# Patient Record
Sex: FEMALE | Race: WHITE | NOT HISPANIC OR LATINO | Employment: OTHER | URBAN - METROPOLITAN AREA
[De-identification: names, ages, dates, MRNs, and addresses within clinical notes are randomized per-mention and may not be internally consistent; named-entity substitution may affect disease eponyms.]

---

## 2017-02-14 ENCOUNTER — GENERIC CONVERSION - ENCOUNTER (OUTPATIENT)
Dept: OTHER | Facility: OTHER | Age: 42
End: 2017-02-14

## 2017-02-21 ENCOUNTER — ALLSCRIPTS OFFICE VISIT (OUTPATIENT)
Dept: OTHER | Facility: OTHER | Age: 42
End: 2017-02-21

## 2017-04-07 ENCOUNTER — ALLSCRIPTS OFFICE VISIT (OUTPATIENT)
Dept: OTHER | Facility: OTHER | Age: 42
End: 2017-04-07

## 2017-06-01 ENCOUNTER — ALLSCRIPTS OFFICE VISIT (OUTPATIENT)
Dept: OTHER | Facility: OTHER | Age: 42
End: 2017-06-01
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
## 2018-01-12 NOTE — MISCELLANEOUS
To whom it may concern:  Sana Huitron (1975) is a patient who follows with our office and was recently hospitalized from 4/2/16-4/10/16 for medical  reasons  She is able to return to school 4/18/16  Please allow for any accommodations as needed for patient with regard to parking, lifting, and stair use  Please contact our office if any further questions or concerns  Thank you in advance with your  assistance with this matter      Sincerely,    Evelin Marvin PA-C        Electronically signed by:[facility]  [facility]  Apr 13 2016 12:50PM EST

## 2018-01-13 VITALS
WEIGHT: 251 LBS | SYSTOLIC BLOOD PRESSURE: 141 MMHG | DIASTOLIC BLOOD PRESSURE: 82 MMHG | HEIGHT: 64 IN | HEART RATE: 73 BPM | BODY MASS INDEX: 42.85 KG/M2

## 2018-01-13 VITALS
SYSTOLIC BLOOD PRESSURE: 136 MMHG | DIASTOLIC BLOOD PRESSURE: 80 MMHG | BODY MASS INDEX: 42 KG/M2 | WEIGHT: 246 LBS | HEART RATE: 73 BPM | HEIGHT: 64 IN
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
## 2018-01-16 NOTE — PSYCH
Message  Message Free Text Note Form: Patient is a 47year old white female with a history of bipolar disorder and anxiety who presented today for a psychiatric evaluation appointment  At the beginning of the session the patient stated she had a long history of bipolar disorder with past hospitalizations, but stopped all her medications 3 years ago (including Lithium) and at this time she only wanted treatment for anxiety, and not for bipolar disorder  Patient was informed that as a psychiatrist I would only accept her as a patient if she was willing to undergo appropriate recommended treatment including treatment for a life long illness as bipolar disorder  At that point patient stated she was not willing to undergo psychiatric evaluation, walked out of the office and left  I informed the patient as she was leaving that she could follow up for anxiety with her PCP  Active Problems    1  Acute Chest Wall Trauma (959 11)   2  Acute frontal sinusitis, recurrence not specified (461 1) (J01 10)   3  ADHD (attention deficit hyperactivity disorder), predominantly hyperactive impulsive type   (314 01) (F90 1)   4  Anxiety (300 00) (F41 9)   5  Bipolar disorder (296 80) (F31 9)   6  Contusion Of The Leg With Intact Skin Surface (924 10)   7  Depression (311) (F32 9)   8  Encounter for PPD test (V74 1) (Z11 1)   9  Familial Combined Hyperlipidemia (272 2)   10  Insomnia (780 52) (G47 00)   11  Need for hepatitis B vaccination (V05 3) (Z23)   12  Need for MMR vaccine (V06 4) (Z23)   13  Need for varicella vaccine (V05 4) (Z23)   14  UTI (urinary tract infection) (599 0) (N39 0)   15  Vitamin D deficiency (268 9) (E55 9)    Current Meds   1  Azithromycin 250 MG Oral Tablet; TAKE 2 TABLETS ON DAY 1 THEN TAKE 1 TABLET A   DAY FOR 4 DAYS; Therapy: 92TCO7214 to (Last Rx:22Twg6964) Ordered   2  Ergocalciferol 21169 UNIT Oral Capsule; TAKE 1 CAPSULE WEEKLY;    Therapy: 83MEM3873 to (Last Rx:16Oct2015)  Requested for: 62LHZ3209 Ordered   3  LORazepam 1 MG Oral Tablet; TAKE 1 TABLET EVERY 12 HOURS as needed; Therapy: 94Tfp3295 to (Evaluate:56Emz4186); Last Rx:50Rua2237 Ordered    Allergies    1  No Known Drug Allergies    2   Seasonal    Signatures   Electronically signed by : RIO Trinh ; Feb 2 2016  2:37PM EST                       (Author)
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
## 2018-01-22 VITALS
BODY MASS INDEX: 41.32 KG/M2 | WEIGHT: 242 LBS | SYSTOLIC BLOOD PRESSURE: 124 MMHG | DIASTOLIC BLOOD PRESSURE: 83 MMHG | HEIGHT: 64 IN | HEART RATE: 74 BPM

## 2018-01-23 DIAGNOSIS — Z12.31 ENCOUNTER FOR SCREENING MAMMOGRAM FOR MALIGNANT NEOPLASM OF BREAST: ICD-10-CM

## 2018-03-07 NOTE — PROGRESS NOTES
History of Present Illness    Revaccination    Spoke with patient regarding vaccine out of temperature range and option for titer  Action(s): Pt will be revaccinated  Appointment scheduled: [id number] JQ  Pt contacted and will call back  Pt called (attempt 1): [phone] kz  Message left on [phone]  Pt called (attempt 2): [phone] kz  Message left on [phone]  Letter Sent (Regular and Certified): [phone]  Other Information: [phone] [id number] [facility] patient  She is agreeable to [facility] but she is at work and is not able to schedule at the time of this call  She will call back  Jazz Swanson  [phone] kz - Called and spoke with patient  She scheduled the Marco Aleta  She would like the Hepatitis B titer as recommended by Dr Bello Delgado  She will  the order form at the time of her RVAC  kz  3/10/2017 1600 kz - No show for RVAC and titer  Jazz Swanson  [phone] kz - Message left on cell phone voicemail requesting a callback with her RVAC/titer decision  Jazz Swanson  [phone] kz - Message left for patient on [phone] requesting a call back to nisreen Rodriguez  [phone] [facility] and spoke with the patient  She has decided to decline the offer of RVAC and/or titer testing  Letters and [employer] mailed today  [phone] USPS return receipt received  See scanned in copy in chart  kz  Active Problems    1  ADHD, predominantly inattentive type (314 00) (F90 0)   2  Arthralgia, unspecified joint (719 40) (M25 50)   3  Bipolar I disorder, most recent episode depressed, severe with psychotic features   (296 54) (F31 5)   4  Contusion Of The Leg With Intact Skin Surface (924 10)   5  SANA (generalized anxiety disorder) (300 02) (F41 1)   6  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 9) (K31 9)   7  Insomnia (780 52) (G47 00)   8  Need for revaccination (V05 9) (Z23)   9  Peptic ulcer disease (533 90) (K27 9)   10   Vitamin D deficiency (268 9) (E51 9)    Immunizations  Hepatitis B --- Cy Suarez: 04-Cul-3596Pswze Mallet: 16-Nov-2015   Influenza --- Lucho Stare: 16-Mxr-1509Cedsq [place]: 13-Oct-2015   PPD --- Series1: 15-Dec-2015   Tdap --- Series1: 15-Oct-2015     Current Meds   1  ARIPiprazole 10 MG Oral Tablet; TAKE 1/2 TABLET AT BEDTIME FOR 7 DAYS THEN 1   TABLET AT BEDTIME   2  Ergocalciferol 74215 UNIT CAPS; TAKE 1 CAPSULE WEEKLY   3  LamoTRIgine 25 MG Oral Tablet; Take 1 tablet at bedtime for 14 days then 2 tablets at   bedtime   4  LORazepam 1 MG Oral Tablet; TAKE 1 TABLET TWICE DAILY AS NEEDED    Allergies    1  No Known Drug Allergies    2  Seasonal    Future Appointments    Date/Time Provider Specialty Site   02/21/2017 02:15 PM RIO Haley   [facility]     Signatures   Electronically signed by : Milli Rodriguez [facility]; Feb 15 2017  8:18AM EST                       (Author)    Electronically signed by : Denver Yang RN; Jul 12 2017 10:26AM EST                       (Author)

## 2018-05-07 ENCOUNTER — OFFICE VISIT (OUTPATIENT)
Dept: INTERNAL MEDICINE CLINIC | Facility: CLINIC | Age: 43
End: 2018-05-07
Payer: MEDICARE

## 2018-05-07 VITALS
RESPIRATION RATE: 16 BRPM | HEIGHT: 64 IN | WEIGHT: 247.4 LBS | OXYGEN SATURATION: 98 % | BODY MASS INDEX: 42.24 KG/M2 | HEART RATE: 78 BPM | TEMPERATURE: 98.5 F | SYSTOLIC BLOOD PRESSURE: 130 MMHG | DIASTOLIC BLOOD PRESSURE: 80 MMHG

## 2018-05-07 DIAGNOSIS — Z13.228 SCREENING FOR METABOLIC DISORDER: ICD-10-CM

## 2018-05-07 DIAGNOSIS — J40 BRONCHITIS: Primary | ICD-10-CM

## 2018-05-07 PROCEDURE — 99213 OFFICE O/P EST LOW 20 MIN: CPT | Performed by: NURSE PRACTITIONER

## 2018-05-07 RX ORDER — AZITHROMYCIN 250 MG/1
TABLET, FILM COATED ORAL
Qty: 6 TABLET | Refills: 0 | Status: SHIPPED | OUTPATIENT
Start: 2018-05-07 | End: 2018-05-11

## 2018-05-07 RX ORDER — ATOMOXETINE 40 MG/1
1 CAPSULE ORAL DAILY
COMMUNITY
Start: 2017-02-21 | End: 2018-07-02 | Stop reason: SDUPTHER

## 2018-05-07 RX ORDER — LORAZEPAM 1 MG/1
1 TABLET ORAL 2 TIMES DAILY PRN
COMMUNITY
Start: 2013-09-13 | End: 2018-07-02 | Stop reason: SDUPTHER

## 2018-05-07 RX ORDER — ARIPIPRAZOLE 10 MG/1
10 TABLET ORAL
COMMUNITY
End: 2018-07-02 | Stop reason: SDUPTHER

## 2018-05-07 RX ORDER — FLUTICASONE PROPIONATE 50 MCG
1 SPRAY, SUSPENSION (ML) NASAL DAILY
Qty: 16 G | Refills: 0 | Status: SHIPPED | OUTPATIENT
Start: 2018-05-07 | End: 2018-07-02 | Stop reason: ALTCHOICE

## 2018-05-07 RX ORDER — LAMOTRIGINE 200 MG/1
100 TABLET ORAL
COMMUNITY
Start: 2016-12-22 | End: 2018-07-02 | Stop reason: SDUPTHER

## 2018-05-07 RX ORDER — CITALOPRAM 20 MG/1
20 TABLET ORAL
COMMUNITY
Start: 2014-07-30 | End: 2018-07-02 | Stop reason: ALTCHOICE

## 2018-05-07 RX ORDER — ERGOCALCIFEROL (VITAMIN D2) 1250 MCG
1 CAPSULE ORAL WEEKLY
COMMUNITY
Start: 2016-09-01 | End: 2018-05-30 | Stop reason: SDUPTHER

## 2018-05-07 NOTE — PATIENT INSTRUCTIONS
Get Debrox drops to soften ear wax  Get fasting bloodwork done  Use Flonase 2 sprays each nostril once daily for postnasal drip  Use Mucinex as prescribed on the box to dry up your sinuses  Rest and fluids advised  Educated that the course of this illness could be 2-4 weeks  Discussed symptomatic relief, such as warm steam inhalations, tylenol/ibuprofen for fevers and body aches, rest, and drink plenty of fluids  Warm salt gargles for sore throat  Discussed red flag signs to go to the ER, such as chest pain or shortness of breath     Return to the office for reevaluation if symptoms worsen or do not improve in 1-2 weeks

## 2018-05-07 NOTE — ASSESSMENT & PLAN NOTE
Patient has had lab work since 2016, will order full blood work panel for lab work and review in 2 weeks with patient

## 2018-05-07 NOTE — PROGRESS NOTES
Assessment/Plan:    Bronchitis  Will give patient Z-Sean  Patient advised to take her antibiotic with food, and to eat yogurt to prevent yeast infection  Get Debrox drops to soften ear wax  Get fasting bloodwork done  Use Flonase 2 sprays each nostril once daily for postnasal drip  Use Mucinex as prescribed on the box to dry up your sinuses  Rest and fluids advised  Educated that the course of this illness could be 2-4 weeks  Discussed symptomatic relief, such as warm steam inhalations, tylenol/ibuprofen for fevers and body aches, rest, and drink plenty of fluids  Warm salt gargles for sore throat  Discussed red flag signs to go to the ER, such as chest pain or shortness of breath  Return to the office for reevaluation if symptoms worsen or do not improve in 1-2 weeks    Screening for metabolic disorder    Patient has had lab work since 2016, will order full blood work panel for lab work and review in 2 weeks with patient  Diagnoses and all orders for this visit:    Bronchitis  -     azithromycin (ZITHROMAX) 250 mg tablet; Take 2 tablets today, and one tablet daily for 4 days  -     fluticasone (FLONASE) 50 mcg/act nasal spray; 1 spray into each nostril daily    Screening for metabolic disorder  -     HEMOGLOBIN A1C W/ EAG ESTIMATION  -     Comprehensive metabolic panel; Future  -     CBC and differential  -     Lipid panel  -     TSH, 3rd generation with T4 reflex  -     Vitamin D 25 hydroxy    Other orders  -     citalopram (CeleXA) 20 mg tablet; Take 20 mg by mouth  -     ARIPiprazole (ABILIFY) 10 mg tablet; Take 10 mg by mouth  -     ergocalciferol (ERGOCALCIFEROL) 13685 units capsule; Take 1 capsule by mouth once a week  -     lamoTRIgine (LaMICtal) 200 MG tablet; Take 1 tablet by mouth  -     LORazepam (ATIVAN) 1 mg tablet; Take 1 tablet by mouth 2 (two) times a day as needed  -     atoMOXetine (STRATTERA) 40 mg capsule;  Take 1 capsule by mouth daily          Subjective:      Patient ID: Guy Peguero is a 38 y o  female  Patient presents today with nasal congestion and ear pressure for about 1 week  Patient also reports being short of breath for the week which has been worsening  Patient also has complaints of left flank pain and lightheadedness with  Deep breathing  Denies sick contacts  URI    This is a new problem  The current episode started in the past 7 days  The problem has been gradually worsening  There has been no fever  Associated symptoms include congestion, coughing (dry), ear pain (L>R), a plugged ear sensation and rhinorrhea  Pertinent negatives include no abdominal pain, chest pain, diarrhea, dysuria, headaches, nausea, sinus pain, sore throat, vomiting or wheezing  Treatments tried: OTC cold medication  The treatment provided no relief  The following portions of the patient's history were reviewed and updated as appropriate: allergies, current medications, past family history, past medical history, past social history, past surgical history and problem list     Review of Systems   Constitutional: Positive for appetite change, chills, diaphoresis and fatigue  HENT: Positive for congestion, ear pain (L>R) and rhinorrhea  Negative for postnasal drip, sinus pain, sinus pressure and sore throat  Eyes: Negative for pain, redness and itching  Respiratory: Positive for cough (dry), chest tightness (due to coughing) and shortness of breath (feels like she can not take a deep breath)  Negative for wheezing  Cardiovascular: Negative for chest pain  Gastrointestinal: Negative for abdominal pain, blood in stool, diarrhea, nausea and vomiting  Genitourinary: Positive for flank pain (due to coughing)  Negative for dysuria and hematuria  Musculoskeletal: Negative for myalgias  Allergic/Immunologic: Positive for environmental allergies  Neurological: Positive for light-headedness (when coughing)  Negative for headaches           Past Medical History: Diagnosis Date    Acute pancreatitis     Last Assessed: 2016    Anemia     Anorexia     Bipolar 1 disorder, mixed, partial remission (HCC)     Concussion     Last Assessed:     Endometriosis     Esophageal reflux     Familial combined hyperlipidemia     Last Assessed: 2013    Irregular heart beat     Migraine     Last Assessed: 2012    Paroxysmal supraventricular tachycardia (HCC)     Seizures (HCC)     Suicide attempt [facility])          Current Outpatient Prescriptions:     ARIPiprazole (ABILIFY) 10 mg tablet, Take 10 mg by mouth, Disp: , Rfl:     atoMOXetine (STRATTERA) 40 mg capsule, Take 1 capsule by mouth daily, Disp: , Rfl:     citalopram (CeleXA) 20 mg tablet, Take 20 mg by mouth, Disp: , Rfl:     ergocalciferol (ERGOCALCIFEROL) 92038 units capsule, Take 1 capsule by mouth once a week, Disp: , Rfl:     lamoTRIgine (LaMICtal) 200 MG tablet, Take 1 tablet by mouth, Disp: , Rfl:     LORazepam (ATIVAN) 1 mg tablet, Take 1 tablet by mouth 2 (two) times a day as needed, Disp: , Rfl:     azithromycin (ZITHROMAX) 250 mg tablet, Take 2 tablets today, and one tablet daily for 4 days  , Disp: 6 tablet, Rfl: 0    fluticasone (FLONASE) 50 mcg/act nasal spray, 1 spray into each nostril daily, Disp: 16 g, Rfl: 0    Allergies   Allergen Reactions    Other      Seasonal allergies       Social History   Past Surgical History:   Procedure Laterality Date     SECTION      INDUCED       x3    LAPAROSCOPIC CHOLECYSTECTOMY      Last Assessed: 2016    LAPAROSCOPY      Exploratory    TONSILLECTOMY      Onset: [id number]     Family History   Problem Relation Age of Onset    Hyperthyroidism Mother     Thyroid disease Mother     Depression Father     Hypertension Father     Obesity Father     Other Paternal Grandmother      Cardiac Disorder    Dementia Paternal Grandmother     Hypertension Paternal Grandmother     Hypertension Paternal Geena Sheridan Other Paternal Grandfather      Cardiac Disorder    Schizophrenia Other     Schizophrenia Other     Suicidality Other     Breast cancer Other        Objective:  /80 (BP Location: Left arm, Patient Position: Sitting, Cuff Size: Large)   Pulse 78   Temp 98 5 °F (36 9 °C) (Oral)   Resp 16   Ht 5' 3 75" (1 619 m)   Wt 112 kg (247 lb 6 4 oz)   SpO2 98%   BMI 42 80 kg/m²     No results found for this or any previous visit (from the past 1344 hour(s))  Physical Exam   Constitutional: She is oriented to person, place, and time  She appears well-developed and well-nourished  She appears ill  No distress  HENT:   Head: Normocephalic and atraumatic  Right Ear: External ear normal  Tympanic membrane is bulging  A middle ear effusion is present  Left Ear: External ear normal    Nose: Mucosal edema and rhinorrhea present  Right sinus exhibits maxillary sinus tenderness  Right sinus exhibits no frontal sinus tenderness  Left sinus exhibits maxillary sinus tenderness  Left sinus exhibits no frontal sinus tenderness  Mouth/Throat: Mucous membranes are pale and dry  Posterior oropharyngeal erythema present  No oropharyngeal exudate or posterior oropharyngeal edema  Unable to view tympanic membrane in left ear due to cerumen impaction   Eyes: Conjunctivae and EOM are normal  Pupils are equal, round, and reactive to light  Right eye exhibits no discharge  Left eye exhibits no discharge  Neck: Normal range of motion  Neck supple  No thyromegaly present  Cardiovascular: Normal rate, regular rhythm, normal heart sounds and intact distal pulses  Exam reveals no gallop and no friction rub  No murmur heard  Pulmonary/Chest: Effort normal and breath sounds normal  No stridor  No respiratory distress  She has no decreased breath sounds  She has no wheezes  She has no rhonchi  She has no rales  Abdominal: Soft  Bowel sounds are normal  She exhibits no distension  There is no tenderness  Lymphadenopathy:     She has no cervical adenopathy  Neurological: She is alert and oriented to person, place, and time  Skin: Skin is warm and dry  No rash noted  She is not diaphoretic  No erythema  Psychiatric: She has a normal mood and affect   Her behavior is normal  Judgment and thought content normal

## 2018-05-07 NOTE — ASSESSMENT & PLAN NOTE
Will give patient Z-Sean  Patient advised to take her antibiotic with food, and to eat yogurt to prevent yeast infection  Get Debrox drops to soften ear wax  Get fasting bloodwork done  Use Flonase 2 sprays each nostril once daily for postnasal drip  Use Mucinex as prescribed on the box to dry up your sinuses  Rest and fluids advised  Educated that the course of this illness could be 2-4 weeks  Discussed symptomatic relief, such as warm steam inhalations, tylenol/ibuprofen for fevers and body aches, rest, and drink plenty of fluids  Warm salt gargles for sore throat  Discussed red flag signs to go to the ER, such as chest pain or shortness of breath     Return to the office for reevaluation if symptoms worsen or do not improve in 1-2 weeks

## 2018-05-27 ENCOUNTER — APPOINTMENT (OUTPATIENT)
Dept: LAB | Facility: HOSPITAL | Age: 43
End: 2018-05-27
Payer: MEDICARE

## 2018-05-27 ENCOUNTER — TRANSCRIBE ORDERS (OUTPATIENT)
Dept: LAB | Facility: HOSPITAL | Age: 43
End: 2018-05-27

## 2018-05-27 DIAGNOSIS — Z13.228 SCREENING FOR METABOLIC DISORDER: ICD-10-CM

## 2018-05-27 LAB
25(OH)D3 SERPL-MCNC: 12.6 NG/ML (ref 30–100)
ALBUMIN SERPL BCP-MCNC: 3.5 G/DL (ref 3.5–5)
ALP SERPL-CCNC: 96 U/L (ref 46–116)
ALT SERPL W P-5'-P-CCNC: 37 U/L (ref 12–78)
ANION GAP SERPL CALCULATED.3IONS-SCNC: 7 MMOL/L (ref 4–13)
AST SERPL W P-5'-P-CCNC: 17 U/L (ref 5–45)
BASOPHILS # BLD AUTO: 0.02 THOUSANDS/ΜL (ref 0–0.1)
BASOPHILS NFR BLD AUTO: 0 % (ref 0–1)
BILIRUB SERPL-MCNC: 0.32 MG/DL (ref 0.2–1)
BUN SERPL-MCNC: 11 MG/DL (ref 5–25)
CALCIUM SERPL-MCNC: 8.8 MG/DL (ref 8.3–10.1)
CHLORIDE SERPL-SCNC: 106 MMOL/L (ref 100–108)
CHOLEST SERPL-MCNC: 191 MG/DL (ref 50–200)
CO2 SERPL-SCNC: 24 MMOL/L (ref 21–32)
CREAT SERPL-MCNC: 0.85 MG/DL (ref 0.6–1.3)
EOSINOPHIL # BLD AUTO: 0.12 THOUSAND/ΜL (ref 0–0.61)
EOSINOPHIL NFR BLD AUTO: 2 % (ref 0–6)
ERYTHROCYTE [DISTWIDTH] IN BLOOD BY AUTOMATED COUNT: 12.7 % (ref 11.6–15.1)
EST. AVERAGE GLUCOSE BLD GHB EST-MCNC: 111 MG/DL
GFR SERPL CREATININE-BSD FRML MDRD: 86 ML/MIN/1.73SQ M
GLUCOSE P FAST SERPL-MCNC: 96 MG/DL (ref 65–99)
HBA1C MFR BLD: 5.5 % (ref 4.2–6.3)
HCT VFR BLD AUTO: 38.2 % (ref 34.8–46.1)
HDLC SERPL-MCNC: 41 MG/DL (ref 40–60)
HGB BLD-MCNC: 12.6 G/DL (ref 11.5–15.4)
LDLC SERPL CALC-MCNC: 135 MG/DL (ref 0–100)
LYMPHOCYTES # BLD AUTO: 2.24 THOUSANDS/ΜL (ref 0.6–4.47)
LYMPHOCYTES NFR BLD AUTO: 32 % (ref 14–44)
MCH RBC QN AUTO: 28 PG (ref 26.8–34.3)
MCHC RBC AUTO-ENTMCNC: 33 G/DL (ref 31.4–37.4)
MCV RBC AUTO: 85 FL (ref 82–98)
MONOCYTES # BLD AUTO: 0.51 THOUSAND/ΜL (ref 0.17–1.22)
MONOCYTES NFR BLD AUTO: 7 % (ref 4–12)
NEUTROPHILS # BLD AUTO: 4.14 THOUSANDS/ΜL (ref 1.85–7.62)
NEUTS SEG NFR BLD AUTO: 59 % (ref 43–75)
NONHDLC SERPL-MCNC: 150 MG/DL
NRBC BLD AUTO-RTO: 0 /100 WBCS
PLATELET # BLD AUTO: 327 THOUSANDS/UL (ref 149–390)
PMV BLD AUTO: 10.6 FL (ref 8.9–12.7)
POTASSIUM SERPL-SCNC: 4.3 MMOL/L (ref 3.5–5.3)
PROT SERPL-MCNC: 7.5 G/DL (ref 6.4–8.2)
RBC # BLD AUTO: 4.5 MILLION/UL (ref 3.81–5.12)
SODIUM SERPL-SCNC: 137 MMOL/L (ref 136–145)
TRIGL SERPL-MCNC: 76 MG/DL
TSH SERPL DL<=0.05 MIU/L-ACNC: 1.75 UIU/ML (ref 0.36–3.74)
WBC # BLD AUTO: 7.05 THOUSAND/UL (ref 4.31–10.16)

## 2018-05-27 PROCEDURE — 82306 VITAMIN D 25 HYDROXY: CPT | Performed by: NURSE PRACTITIONER

## 2018-05-27 PROCEDURE — 80053 COMPREHEN METABOLIC PANEL: CPT

## 2018-05-27 PROCEDURE — 80061 LIPID PANEL: CPT | Performed by: NURSE PRACTITIONER

## 2018-05-27 PROCEDURE — 83036 HEMOGLOBIN GLYCOSYLATED A1C: CPT | Performed by: NURSE PRACTITIONER

## 2018-05-27 PROCEDURE — 84443 ASSAY THYROID STIM HORMONE: CPT | Performed by: NURSE PRACTITIONER

## 2018-05-27 PROCEDURE — 85025 COMPLETE CBC W/AUTO DIFF WBC: CPT | Performed by: NURSE PRACTITIONER

## 2018-05-27 PROCEDURE — 36415 COLL VENOUS BLD VENIPUNCTURE: CPT | Performed by: NURSE PRACTITIONER

## 2018-05-30 ENCOUNTER — OFFICE VISIT (OUTPATIENT)
Dept: INTERNAL MEDICINE CLINIC | Facility: CLINIC | Age: 43
End: 2018-05-30
Payer: MEDICARE

## 2018-05-30 VITALS
HEIGHT: 63 IN | BODY MASS INDEX: 43.59 KG/M2 | WEIGHT: 246 LBS | HEART RATE: 75 BPM | SYSTOLIC BLOOD PRESSURE: 126 MMHG | TEMPERATURE: 97 F | OXYGEN SATURATION: 97 % | DIASTOLIC BLOOD PRESSURE: 74 MMHG

## 2018-05-30 DIAGNOSIS — F31.62 BIPOLAR I DISORDER, MOST RECENT EPISODE MIXED, MODERATE (HCC): ICD-10-CM

## 2018-05-30 DIAGNOSIS — E55.9 VITAMIN D DEFICIENCY: Primary | ICD-10-CM

## 2018-05-30 DIAGNOSIS — F41.1 ANXIETY STATE: ICD-10-CM

## 2018-05-30 DIAGNOSIS — F90.0 ADHD, PREDOMINANTLY INATTENTIVE TYPE: ICD-10-CM

## 2018-05-30 DIAGNOSIS — Z12.39 BREAST CANCER SCREENING: ICD-10-CM

## 2018-05-30 PROCEDURE — 99214 OFFICE O/P EST MOD 30 MIN: CPT | Performed by: NURSE PRACTITIONER

## 2018-05-30 RX ORDER — ERGOCALCIFEROL (VITAMIN D2) 1250 MCG
1 CAPSULE ORAL WEEKLY
Qty: 12 CAPSULE | Refills: 0 | Status: SHIPPED | OUTPATIENT
Start: 2018-05-30 | End: 2018-09-26

## 2018-05-30 NOTE — PROGRESS NOTES
Assessment/Plan:     Vitamin D deficiency   Patient is to take ergocalciferol  95264 units by mouth weekly, will recheck a vitamin-D level in 4 months  After she completes her 12 weeks of ergocalciferol patient is to take 2000 International Units see of ergocalciferol daily  BMI 40 0-44 9, adult [facility])   Will refer patient to weight management  Patient requested to be checked for questions syndrome as she has a moon face and help in her back, will to salivary cortisol level 2 specimens  Bipolar I disorder, most recent episode mixed, moderate ([facility] [address] )  Patient sees Dr Sushma Pavon   For bipolar, anxiety, and ADHD  She has follow-up with their office in July of this year  She gets her Lamictal her Ativan her Celexa and her sitter care from Dr Sushma Pavon  Diagnoses and all orders for this visit:    Vitamin D deficiency  -     ergocalciferol (ERGOCALCIFEROL) 46080 units capsule; Take 1 capsule (50,000 Units total) by mouth once a week for 12 doses    Breast cancer screening  -     Mammo screening bilateral w 3d & cad; Future    ADHD, predominantly inattentive type    Anxiety state    BMI 40 0-44 9, adult ([facility] [address] )  -     Ambulatory referral to Weight Management; Future  -     SALIVARY CORTISOL, TWO SPECIMENS; Future    Bipolar I disorder, most recent episode mixed, moderate (HCC)          Subjective:      Patient ID: Sona Basurto is a 38 y o  female  Patient presents today to review blood work  Patient's vitamin-D level was low at 12 6,  Patient has had vitamin D deficiency in the past, she denies taking a vitamin-D supplement on a daily basis at this time  Patient also had an elevated LDL level, however her HDL as well as her cholesterol level within normal limits  Patient reports that she the corrects her LDL level with diet as well as exercise  Patient currently feels that she is stable with her bipolar and anxiety  Patient has no further complaints at this time     Patient sees Psychiatry  For her history of bipolar, anxiety and ADHD  She sees Dr Shola Carranza, her next appointment is on July 2,2018  Patient did inquire about being tested for Cushing's syndrome, as she does have history of obesity, moon face as well as a hump in her back  The following portions of the patient's history were reviewed and updated as appropriate: allergies, current medications, past family history, past medical history, past social history, past surgical history and problem list     Review of Systems   Constitutional: Negative for activity change, appetite change, chills, diaphoresis and fever  HENT: Negative for congestion, ear discharge, ear pain, postnasal drip, rhinorrhea, sinus pain, sinus pressure and sore throat  Eyes: Negative for pain, discharge, itching and visual disturbance  Respiratory: Negative for cough, chest tightness, shortness of breath and wheezing  Cardiovascular: Negative for chest pain, palpitations and leg swelling  Gastrointestinal: Negative for abdominal pain, constipation, diarrhea, nausea and vomiting  Endocrine: Negative for polydipsia, polyphagia and polyuria  Genitourinary: Negative for difficulty urinating, dysuria and urgency  Musculoskeletal: Negative for arthralgias, back pain and neck pain  Skin: Negative for rash and wound  Neurological: Negative for dizziness, weakness, numbness and headaches  Psychiatric/Behavioral: Negative for sleep disturbance and suicidal ideas           Past Medical History:   Diagnosis Date    Acute pancreatitis     Last Assessed: 12Apr2016    Anemia     Arthralgia     Concussion     Last Assessed: [id number]    Endometriosis     Esophageal reflux     Familial combined hyperlipidemia     Last Assessed: 13Sep2013    Gastropathy     History of sinusitis     Irregular heart beat     Migraine     Last Assessed: 11Oct2012    Paroxysmal supraventricular tachycardia (HCC)     Peptic ulcer disease     [facility]izures [facility] [address] )     Spontaneous      [facility] ([facility] [address] )     UTI (urinary tract infection)     Vitamin D deficiency          Current Outpatient Prescriptions:     ARIPiprazole (ABILIFY) 10 mg tablet, Take 10 mg by mouth, Disp: , Rfl:     atoMOXetine (STRATTERA) 40 mg capsule, Take 1 capsule by mouth daily, Disp: , Rfl:     citalopram (CeleXA) 20 mg tablet, Take 20 mg by mouth, Disp: , Rfl:     fluticasone (FLONASE) 50 mcg/act nasal spray, 1 spray into each nostril daily, Disp: 16 g, Rfl: 0    lamoTRIgine (LaMICtal) 200 MG tablet, Take 1 tablet by mouth, Disp: , Rfl:     LORazepam (ATIVAN) 1 mg tablet, Take 1 tablet by mouth 2 (two) times a day as needed, Disp: , Rfl:     ergocalciferol (ERGOCALCIFEROL) 09531 units capsule, Take 1 capsule (50,000 Units total) by mouth once a week for 12 doses, Disp: 12 capsule, Rfl: 0    Allergies   Allergen Reactions    Other      Seasonal allergies       Social History   Past Surgical History:   Procedure Laterality Date     SECTION      INDUCED       x3    LAPAROSCOPIC CHOLECYSTECTOMY      Last Assessed: 2016    LAPAROSCOPY      Exploratory    TONSILLECTOMY      Onset:      Family History   Problem Relation Age of Onset    Hyperthyroidism Mother     Thyroid disease Mother     Depression Father     Hypertension Father     Obesity Father     Other Paternal Grandmother      Cardiac Disorder    Dementia Paternal Grandmother     Hypertension Paternal Grandmother     Hypertension Paternal Grandfather     Other Paternal Grandfather      Cardiac Disorder    Schizophrenia Other     Schizophrenia Other     Suicidality Other     Breast cancer Other        Objective:  /74 (BP Location: Left arm, Patient Position: Sitting, Cuff Size: Large)   Pulse 75   Temp (!) 97 °F (36 1 °C) (Oral)   Ht 5' 3" (1 6 m)   Wt 112 kg (246 lb)   SpO2 97%   BMI 43 58 kg/m²     Recent Results (from the past 1344 hour(s))   HEMOGLOBIN A1C W/ EAG ESTIMATION    Collection Time: 05/27/18  9:10 AM   Result Value Ref Range    Hemoglobin A1C 5 5 4 2 - 6 3 %     mg/dl   CBC and differential    Collection Time: 05/27/18  9:10 AM   Result Value Ref Range    WBC 7 05 4 31 - 10 16 Thousand/uL    RBC 4 50 3 81 - 5 12 Million/uL    Hemoglobin 12 6 11 5 - 15 4 g/dL    Hematocrit 38 2 34 8 - 46 1 %    MCV 85 82 - 98 fL    MCH 28 0 26 8 - 34 3 pg    MCHC 33 0 31 4 - 37 4 g/dL    RDW 12 7 11 6 - 15 1 %    MPV 10 6 8 9 - 12 7 fL    Platelets 356 205 - 420 Thousands/uL    nRBC 0 /100 WBCs    Neutrophils Relative 59 43 - 75 %    Lymphocytes Relative 32 14 - 44 %    Monocytes Relative 7 4 - 12 %    Eosinophils Relative 2 0 - 6 %    Basophils Relative 0 0 - 1 %    Neutrophils Absolute 4 14 1 85 - 7 62 Thousands/µL    Lymphocytes Absolute 2 24 0 60 - 4 47 Thousands/µL    Monocytes Absolute 0 51 0 17 - 1 22 Thousand/µL    Eosinophils Absolute 0 12 0 00 - 0 61 Thousand/µL    Basophils Absolute 0 02 0 00 - 0 10 Thousands/µL   Lipid panel    Collection Time: 05/27/18  9:10 AM   Result Value Ref Range    Cholesterol 191 50 - 200 mg/dL    Triglycerides 76 <=150 mg/dL    HDL, Direct 41 40 - 60 mg/dL    LDL Calculated 135 (H) 0 - 100 mg/dL    Non-HDL-Chol (CHOL-HDL) 150 mg/dl   TSH, 3rd generation with T4 reflex    Collection Time: 05/27/18  9:10 AM   Result Value Ref Range    TSH 3RD GENERATON 1 750 0 358 - 3 740 uIU/mL   Vitamin D 25 hydroxy    Collection Time: 05/27/18  9:10 AM   Result Value Ref Range    Vit D, 25-Hydroxy 12 6 (L) 30 0 - 100 0 ng/mL   Comprehensive metabolic panel    Collection Time: 05/27/18  9:10 AM   Result Value Ref Range    Sodium 137 136 - 145 mmol/L    Potassium 4 3 3 5 - 5 3 mmol/L    Chloride 106 100 - 108 mmol/L    CO2 24 21 - 32 mmol/L    Anion Gap 7 4 - 13 mmol/L    BUN 11 5 - 25 mg/dL    Creatinine 0 85 0 60 - 1 30 mg/dL    Glucose, Fasting 96 65 - 99 mg/dL    Calcium 8 8 8 3 - 10 1 mg/dL    AST 17 5 - 45 U/L    ALT 37 12 - 78 U/L    Alkaline Phosphatase 96 46 - 116 U/L    Total Protein 7 5 6 4 - 8 2 g/dL    Albumin 3 5 3 5 - 5 0 g/dL    Total Bilirubin 0 32 0 20 - 1 00 mg/dL    eGFR 86 ml/min/1 73sq m            Physical Exam   Constitutional: She is oriented to person, place, and time  She appears well-developed and well-nourished  No distress  HENT:   Head: Normocephalic and atraumatic  Right Ear: External ear normal    Left Ear: External ear normal    Nose: Nose normal    Mouth/Throat: Oropharynx is clear and moist  No oropharyngeal exudate  Eyes: Conjunctivae and EOM are normal  Pupils are equal, round, and reactive to light  Right eye exhibits no discharge  Left eye exhibits no discharge  Neck: Normal range of motion  Neck supple  No thyromegaly present  Cardiovascular: Normal rate, regular rhythm, normal heart sounds and intact distal pulses  Exam reveals no gallop and no friction rub  No murmur heard  Pulmonary/Chest: Effort normal and breath sounds normal  No stridor  No respiratory distress  She has no wheezes  She has no rales  Abdominal: Soft  Bowel sounds are normal  She exhibits no distension  There is no tenderness  Lymphadenopathy:     She has no cervical adenopathy  Neurological: She is alert and oriented to person, place, and time  Skin: Skin is warm and dry  No rash noted  She is not diaphoretic  No erythema  Psychiatric: She has a normal mood and affect   Her behavior is normal  Judgment and thought content normal

## 2018-05-30 NOTE — ASSESSMENT & PLAN NOTE
Patient is to take ergocalciferol  28570 units by mouth weekly, will recheck a vitamin-D level in 4 months  After she completes her 12 weeks of ergocalciferol patient is to take 2000 International Units see of ergocalciferol daily

## 2018-05-30 NOTE — ASSESSMENT & PLAN NOTE
Will refer patient to weight management  Patient requested to be checked for questions syndrome as she has a moon face and help in her back, will to salivary cortisol level 2 specimens

## 2018-05-30 NOTE — ASSESSMENT & PLAN NOTE
Patient sees Dr Qi Chris   For bipolar, anxiety, and ADHD  She has follow-up with their office in July of this year  She gets her Lamictal her Ativan her Celexa and her sitter care from Dr Qi Chris

## 2018-05-30 NOTE — PATIENT INSTRUCTIONS
Take 18214 units of ergocalciferol once a week for 12 weeks, then recheck vitamin-D level  After he completed 12 week course take 2000 International Units of vitamin D daily

## 2018-06-28 ENCOUNTER — OFFICE VISIT (OUTPATIENT)
Dept: URGENT CARE | Age: 43
End: 2018-06-28
Payer: MEDICARE

## 2018-06-28 ENCOUNTER — APPOINTMENT (OUTPATIENT)
Dept: RADIOLOGY | Age: 43
End: 2018-06-28
Payer: MEDICARE

## 2018-06-28 VITALS
BODY MASS INDEX: 43.41 KG/M2 | HEIGHT: 63 IN | TEMPERATURE: 98.9 F | WEIGHT: 245 LBS | DIASTOLIC BLOOD PRESSURE: 70 MMHG | SYSTOLIC BLOOD PRESSURE: 128 MMHG | RESPIRATION RATE: 20 BRPM | OXYGEN SATURATION: 95 % | HEART RATE: 80 BPM

## 2018-06-28 DIAGNOSIS — S69.91XA HAND INJURY, RIGHT, INITIAL ENCOUNTER: ICD-10-CM

## 2018-06-28 DIAGNOSIS — S60.151A CONTUSION OF RIGHT LITTLE FINGER WITH DAMAGE TO NAIL, INITIAL ENCOUNTER: Primary | ICD-10-CM

## 2018-06-28 PROBLEM — F31.62 BIPOLAR I DISORDER, MOST RECENT EPISODE MIXED, MODERATE (HCC): Chronic | Status: ACTIVE | Noted: 2017-06-01

## 2018-06-28 PROBLEM — G47.09 OTHER INSOMNIA: Chronic | Status: ACTIVE | Noted: 2017-06-01

## 2018-06-28 PROBLEM — G47.09 OTHER INSOMNIA: Status: ACTIVE | Noted: 2017-06-01

## 2018-06-28 PROCEDURE — 99213 OFFICE O/P EST LOW 20 MIN: CPT | Performed by: PHYSICIAN ASSISTANT

## 2018-06-28 PROCEDURE — G0463 HOSPITAL OUTPT CLINIC VISIT: HCPCS | Performed by: PHYSICIAN ASSISTANT

## 2018-06-28 PROCEDURE — 73130 X-RAY EXAM OF HAND: CPT

## 2018-06-28 NOTE — PROGRESS NOTES
[facility]        NAME: Domingo Lynch is a 38 y o  female  : 1975    MRN: [id number]  DATE: 2018  TIME: 6:58 PM    Assessment and Plan   Contusion of right little finger with damage to nail, initial encounter [S60 151A]  1  Contusion of right little finger with damage to nail, initial encounter  XR hand 3+ vw right     X-ray provider read no acute injury found  Instructed on RICE and will be given information for ortho    Patient Instructions     Rest, ice, elevate the affected limb  Take over-the-counter pain medication for symptom relief  Follow up with Orthopedics this week  Call Anthony Brown to schedule an appointment: [phone]  Go to ER if new or worsening symptoms occur  Chief Complaint     Chief Complaint   Patient presents with    Hand Pain     Patient states she tripped over her dog at home approximately 45 minutes ago and jammed hand into wall, injuring right pinky finger  Reports pain radiating into palm and ring finger  History of Present Illness       Hand Pain    The incident occurred less than 1 hour ago  The incident occurred at home  Injury mechanism: Tripped over dog punching wall and striking R 5th digit  Pain location: R 5th digit of hand and 4th digit  The quality of the pain is described as aching  The pain is at a severity of 7/10  The pain is moderate  The pain has been constant since the incident  Pertinent negatives include no chest pain, muscle weakness, numbness or tingling  The symptoms are aggravated by movement and palpation  She has tried nothing for the symptoms  Review of Systems   Review of Systems   Constitutional: Negative for chills, diaphoresis, fatigue and fever  HENT: Negative  Eyes: Negative  Respiratory: Negative for cough, chest tightness and shortness of breath  Cardiovascular: Negative for chest pain and palpitations  Gastrointestinal: Negative for constipation, diarrhea, nausea and vomiting  Endocrine: Negative  Genitourinary: Negative for dysuria  Musculoskeletal: Negative  Skin: Negative for pallor and rash  Allergic/Immunologic: Negative  Neurological: Negative for dizziness, tingling, syncope, numbness and headaches  Hematological: Negative  Psychiatric/Behavioral: Negative            Current Medications       Current Outpatient Prescriptions:     ARIPiprazole (ABILIFY) 10 mg tablet, Take 10 mg by mouth, Disp: , Rfl:     atoMOXetine (STRATTERA) 40 mg capsule, Take 1 capsule by mouth daily, Disp: , Rfl:     citalopram (CeleXA) 20 mg tablet, Take 20 mg by mouth, Disp: , Rfl:     ergocalciferol (ERGOCALCIFEROL) 02052 units capsule, Take 1 capsule (50,000 Units total) by mouth once a week for 12 doses, Disp: 12 capsule, Rfl: 0    fluticasone (FLONASE) 50 mcg/act nasal spray, 1 spray into each nostril daily, Disp: 16 g, Rfl: 0    lamoTRIgine (LaMICtal) 200 MG tablet, Take 1 tablet by mouth, Disp: , Rfl:     LORazepam (ATIVAN) 1 mg tablet, Take 1 tablet by mouth 2 (two) times a day as needed, Disp: , Rfl:     Current Allergies     Allergies as of 06/28/2018 - Reviewed 06/28/2018   Allergen Reaction Noted    Other  02/18/2016    Quetiapine  06/21/2018            The following portions of the patient's history were reviewed and updated as appropriate: allergies, current medications, past family history, past medical history, past social history, past surgical history and problem list      Past Medical History:   Diagnosis Date    Acute pancreatitis     Last Assessed: [id number]    Anemia     Arthralgia     Concussion     Last Assessed: 11Oct2012    Concussion     Endometriosis     Esophageal reflux     Familial combined hyperlipidemia     Last Assessed: [id number]    Gastropathy     History of sinusitis     Irregular heart beat     Migraine     Last Assessed: 11Oct2012    Paroxysmal supraventricular tachycardia (HCC)     Peptic ulcer disease     Seizures [facility] [address] [facility]     Spontaneous      [facility] [address] [facility]     UTI (urinary tract infection)     Vitamin D deficiency        Past Surgical History:   Procedure Laterality Date     SECTION      INDUCED       x3    LAPAROSCOPIC CHOLECYSTECTOMY      Last Assessed: 2016    LAPAROSCOPY      Exploratory    TONSILLECTOMY      Onset: [id number]       Family History   Problem Relation Age of Onset    Hyperthyroidism Mother     Thyroid disease Mother    Lindsey Silence Depression Father     Hypertension Father     Obesity Father     Other Paternal Grandmother         Cardiac Disorder    Dementia Paternal Grandmother     Hypertension Paternal Grandmother     Hypertension Paternal Grandfather     Other Paternal Grandfather         Cardiac Disorder    Schizophrenia Other     Schizophrenia Other     Suicidality Other     Breast cancer Other          Medications have been verified  Objective   /70   Pulse 80   Temp 98 9 °F (37 2 °C)   Resp 20   Ht 5' 3" (1 6 m)   Wt 111 kg (245 lb)   LMP 2018 (Exact Date)   SpO2 95%   Breastfeeding? No   BMI 43 40 kg/m²        Physical Exam     Physical Exam   Constitutional: She appears well-developed and well-nourished  No distress  HENT:   Head: Normocephalic and atraumatic  Right Ear: External ear normal    Left Ear: External ear normal    Eyes: Conjunctivae are normal  Right eye exhibits no discharge  Left eye exhibits no discharge  Neck: Normal range of motion  Neck supple  Cardiovascular: Normal rate, regular rhythm, normal heart sounds and intact distal pulses  Pulmonary/Chest: Effort normal and breath sounds normal  No respiratory distress  She has no wheezes  She has no rales  Musculoskeletal:        Right hand: She exhibits tenderness (Slight tenderness to distal right 5th digit  No bruising or swelling)   She exhibits normal range of motion (Patient easily able to extend and flex all digits), no bony tenderness, normal capillary refill, no deformity and no swelling  Normal sensation (Sensation intact and symmetrical to left side for all digits) noted  Normal strength noted  Lymphadenopathy:     She has no cervical adenopathy  Skin: Skin is warm  No rash noted  She is not diaphoretic  Nursing note and vitals reviewed
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
## 2018-06-28 NOTE — PATIENT INSTRUCTIONS
Rest, ice, elevate the affected limb  Take over-the-counter pain medication for symptom relief  Follow up with Orthopedics this week  Call Gunnar Franco to schedule an appointment: [phone]  Go to ER if new or worsening symptoms occur  Contusion in Adults   WHAT YOU NEED TO KNOW:   A contusion is a bruise that appears on your skin after an injury  A bruise happens when small blood vessels tear but skin does not  When blood vessels tear, blood leaks into nearby tissue, such as soft tissue or muscle  DISCHARGE INSTRUCTIONS:   Return to the emergency department if:   · You have new trouble moving the injured area  · You have tingling or numbness in or near the injured area  · Your hand or foot below the bruise gets cold or turns pale  Contact your healthcare provider if:   · You find a new lump in the injured area  · Your symptoms do not improve with treatment after 4 to 5 days  · You have questions or concerns about your condition or care  Medicines: You may need any of the following:  · NSAIDs  help decrease swelling and pain or fever  This medicine is available with or without a doctor's order  NSAIDs can cause stomach bleeding or kidney problems in certain people  If you take blood thinner medicine, always ask your healthcare provider if NSAIDs are safe for you  Always read the medicine label and follow directions  · Prescription pain medicine  may be given  Do not wait until the pain is severe before you take your medicine  · Take your medicine as directed  Contact your healthcare provider if you think your medicine is not helping or if you have side effects  Tell him of her if you are allergic to any medicine  Keep a list of the medicines, vitamins, and herbs you take  Include the amounts, and when and why you take them  Bring the list or the pill bottles to follow-up visits  Carry your medicine list with you in case of an emergency    Follow up with your healthcare provider as directed: You may need to return within a week to check your injury again  Write down your questions so you remember to ask them during your visits  Help a contusion heal:   · Rest the injured area  or use it less than usual  If you bruised your leg or foot, you may need crutches or a cane to help you walk  This will help you keep weight off your injured body part  · Apply ice  to decrease swelling and pain  Ice may also help prevent tissue damage  Use an ice pack, or put crushed ice in a plastic bag  Cover it with a towel and place it on your bruise for 15 to 20 minutes every hour or as directed  · Use compression  to support the area and decrease swelling  Wrap an elastic bandage around the area over the bruised muscle  Make sure the bandage is not too tight  You should be able to fit 1 finger between the bandage and your skin  · Elevate (raise) your injured body part  above the level of your heart to help decrease pain and swelling  Use pillows, blankets, or rolled towels to elevate the area as often as you can  · Do not drink alcohol  as directed  Alcohol may slow healing  · Do not stretch injured muscles  right after your injury  Ask your healthcare provider when and how you may safely stretch after your injury  Gentle stretches can help increase your flexibility  · Do not massage the area or put heating pads  on the bruise right after your injury  Heat and massage may slow healing  Your healthcare provider may tell you to apply heat after several days  At that time, heat will start to help the injury heal   Prevent another contusion:   · Stretch and warm up before you play sports or exercise  · Wear protective gear when you play sports  Examples are shin guards and padding       · If you begin a new physical activity, start slowly to give your body a chance to adjust   © 2017 [employer] Information is for End User's use only and may not be sold, redistributed or otherwise used for commercial purposes  All illustrations and images included in CareNotes® are the copyrighted property of [employer] , [employer]  or [facility]  The above information is an  only  It is not intended as medical advice for individual conditions or treatments  Talk to your doctor, nurse or pharmacist before following any medical regimen to see if it is safe and effective for you

## 2018-07-02 ENCOUNTER — OFFICE VISIT (OUTPATIENT)
Dept: PSYCHIATRY | Facility: CLINIC | Age: 43
End: 2018-07-02
Payer: MEDICARE

## 2018-07-02 VITALS
HEIGHT: 63 IN | BODY MASS INDEX: 43.23 KG/M2 | HEART RATE: 78 BPM | WEIGHT: 244 LBS | SYSTOLIC BLOOD PRESSURE: 106 MMHG | DIASTOLIC BLOOD PRESSURE: 73 MMHG

## 2018-07-02 DIAGNOSIS — F90.0 ADHD, PREDOMINANTLY INATTENTIVE TYPE: Chronic | ICD-10-CM

## 2018-07-02 DIAGNOSIS — F41.1 GAD (GENERALIZED ANXIETY DISORDER): Chronic | ICD-10-CM

## 2018-07-02 DIAGNOSIS — F31.64 BIPOLAR I DISORDER, MOST RECENT EPISODE MIXED, SEVERE WITH PSYCHOTIC FEATURES (HCC): Primary | Chronic | ICD-10-CM

## 2018-07-02 PROCEDURE — 90792 PSYCH DIAG EVAL W/MED SRVCS: CPT | Performed by: PSYCHIATRY & NEUROLOGY

## 2018-07-02 RX ORDER — ARIPIPRAZOLE 10 MG/1
10 TABLET ORAL
Qty: 30 TABLET | Refills: 3 | Status: SHIPPED | OUTPATIENT
Start: 2018-07-02 | End: 2018-10-01 | Stop reason: HOSPADM

## 2018-07-02 RX ORDER — LORAZEPAM 1 MG/1
1 TABLET ORAL 2 TIMES DAILY PRN
Qty: 60 TABLET | Refills: 3 | Status: SHIPPED | OUTPATIENT
Start: 2018-07-02 | End: 2018-10-01 | Stop reason: HOSPADM

## 2018-07-02 RX ORDER — LAMOTRIGINE 100 MG/1
50 TABLET ORAL
Qty: 60 TABLET | Refills: 3 | Status: SHIPPED | OUTPATIENT
Start: 2018-07-02 | End: 2018-10-01 | Stop reason: HOSPADM

## 2018-07-02 RX ORDER — ATOMOXETINE 40 MG/1
40 CAPSULE ORAL DAILY
Qty: 30 CAPSULE | Refills: 3 | Status: SHIPPED | OUTPATIENT
Start: 2018-07-02 | End: 2018-10-01 | Stop reason: HOSPADM

## 2018-07-02 NOTE — PSYCH
TREATMENT PLAN (Medication Management Only)        [facility]    Name/Date of Birth/MRN:  Guy Peguero 38 y o  1975 MRN: [id number]  Date of Treatment Plan: July 2, 2018  Diagnosis/Diagnoses:   1  Bipolar I disorder, most recent episode mixed, severe with psychotic features ([facility] [place] [address] )    2  SANA (generalized anxiety disorder)    3  ADHD, predominantly inattentive type      Strengths/Personal Resources for Self-Care: "knowledgeable about illness, good family and occupational support"  Area/Areas of need (in own words): "often non-med compliant, difficulty dealing with high levels of stress"  1  Long Term Goal: improve mood stability  Target Date: 2 months - 9/2/2018  Person/Persons responsible for completion of goal: Cole Wood  2  Short Term Objective (s) - How will we reach this goal?:   A  Provider new recommended medication/dosage changes and/or continue medication(s): restart Lamictal, continue all other medications (Abilify, Ativan and Strattera)  B   N/A   C   N/A  Target Date: 2 months - 9/2/2018  Person/Persons Responsible for Completion of Goal: Cole Wood   Progress Towards Goals: progressing  Treatment Modality: medication management every 2 months, referral for individual psychotherapy  Review due 90 to 120 days from date of this plan: 4 months - 11/2/2018  Expected length of service: ongoing treatment  My Physician/PA/NP and I have developed this plan together and I agree to work on the goals and objectives  I understand the treatment goals that were developed for my treatment    Signature:       Date and time:  Signature of parent/guardian if under age of 15 years: Date and time:  Signature of provider:      Date and time:  Signature of Supervising Physician:    Date and time: 7/2/2018      Harvinder Richmond MD

## 2018-07-03 ENCOUNTER — TELEPHONE (OUTPATIENT)
Dept: PSYCHIATRY | Facility: CLINIC | Age: 43
End: 2018-07-03

## 2018-07-03 NOTE — LETTER
Date: 7/17/2018      ATTN: Ms Davis Fails  Fax: [phone]      This is to certify that Domingo Lynch has been under my care for the following diagnosis:  Bipolar Disorder, type I, mixed, with psychotic features    Generalized Anxiety Disorder    ADHD, inattentive type    She requires specific accommodations at work to assure stability of her condition, including: extra breaks    ability to close her door with clients for no more than 15 minutes      Please contact us if you had any further questions          Sincerely,         Renea Dunaway MD

## 2018-07-03 NOTE — TELEPHONE ENCOUNTER
Elsie Arriaza was seen on 7/2/18, a letter was typed for her employer, but she called today because the employer is now requesting the letter be revised with special accommodations:    Extra breaks  Ability to close her door with clients for no more than 15 minutes  And fax to [facility] [phone]  *i spoke to Elsie Arriaza today to explain dr Yusef Wise is away from the office until 7/16/18  She's going to inform her employer, and see if they're willing to wait until the doctor returns

## 2018-07-03 NOTE — TELEPHONE ENCOUNTER
----- Message from Isabela Horton MD sent at 7/2/2018  4:44 PM EDT -----  Please schedule Tk Negro for psychotherapy - she prefers male therapist  Thank you

## 2018-08-03 ENCOUNTER — OFFICE VISIT (OUTPATIENT)
Dept: OBGYN CLINIC | Facility: CLINIC | Age: 43
End: 2018-08-03
Payer: MEDICARE

## 2018-08-03 VITALS — BODY MASS INDEX: 42.87 KG/M2 | WEIGHT: 242 LBS | SYSTOLIC BLOOD PRESSURE: 130 MMHG | DIASTOLIC BLOOD PRESSURE: 72 MMHG

## 2018-08-03 DIAGNOSIS — Z12.31 ENCOUNTER FOR SCREENING MAMMOGRAM FOR MALIGNANT NEOPLASM OF BREAST: ICD-10-CM

## 2018-08-03 DIAGNOSIS — N92.1 MENORRHAGIA WITH IRREGULAR CYCLE: Primary | ICD-10-CM

## 2018-08-03 DIAGNOSIS — N94.6 DYSMENORRHEA: ICD-10-CM

## 2018-08-03 PROCEDURE — 88175 CYTOPATH C/V AUTO FLUID REDO: CPT | Performed by: OBSTETRICS & GYNECOLOGY

## 2018-08-03 PROCEDURE — 99213 OFFICE O/P EST LOW 20 MIN: CPT | Performed by: OBSTETRICS & GYNECOLOGY

## 2018-08-03 NOTE — PROGRESS NOTES
Assessment/Plan: This 29-year-old patient is seen today due to heavy menses and severe cramps with her menses  Diagnoses and all orders for this visit:    Menorrhagia with irregular cycle  -     US pelvis complete w transvaginal; Future  -     Liquid-based pap, diagnostic    Dysmenorrhea  -     US pelvis complete w transvaginal; Future  -     Liquid-based pap, diagnostic    Encounter for screening mammogram for malignant neoplasm of breast  -     Mammo screening bilateral w cad; Future          Subjective:     Patient ID: Loulou Garcia is a 38 y o  female  HPI this 29-year-old patient over the past year has noticed severe cramps with her menstrual flow as well as passage of blood clots  She has 4 days to  5 days of bleeding  Day 2 with 3 are the most severe cramps wise  She does passed clots but does not have excessive bleeding soaking her clothing  She has periods that may recur after she 1-2 month intervals  She does not bleed in between periods  Review of Systems      Objective:     Physical Exam  pelvic exam reveals uterus to be anterior mobile normal size and well supported  No adnexal masses are present  The cervix is normal to appearance  There is no blood or discharge in the vagina    The vulva is normal

## 2018-08-07 LAB
LAB AP GYN PRIMARY INTERPRETATION: NORMAL
LAB AP LMP: NORMAL
Lab: NORMAL

## 2018-08-16 ENCOUNTER — TRANSCRIBE ORDERS (OUTPATIENT)
Dept: RADIOLOGY | Facility: HOSPITAL | Age: 43
End: 2018-08-16

## 2018-08-16 ENCOUNTER — HOSPITAL ENCOUNTER (OUTPATIENT)
Dept: RADIOLOGY | Facility: HOSPITAL | Age: 43
Discharge: HOME | End: 2018-08-16
Attending: OBSTETRICS & GYNECOLOGY
Payer: MEDICARE

## 2018-08-16 DIAGNOSIS — Z12.31 ENCOUNTER FOR SCREENING MAMMOGRAM FOR MALIGNANT NEOPLASM OF BREAST: ICD-10-CM

## 2018-08-16 DIAGNOSIS — N92.1 MENORRHAGIA WITH IRREGULAR CYCLE: ICD-10-CM

## 2018-08-16 DIAGNOSIS — N94.6 DYSMENORRHEA: ICD-10-CM

## 2018-08-16 PROCEDURE — 77067 SCR MAMMO BI INCL CAD: CPT

## 2018-08-16 PROCEDURE — 76856 US EXAM PELVIC COMPLETE: CPT

## 2018-08-16 PROCEDURE — 76830 TRANSVAGINAL US NON-OB: CPT

## 2018-08-20 ENCOUNTER — TELEPHONE (OUTPATIENT)
Dept: OBGYN CLINIC | Facility: CLINIC | Age: 43
End: 2018-08-20

## 2018-08-20 NOTE — TELEPHONE ENCOUNTER
----- Message from Donn Timmons MD sent at 8/20/2018  8:27 AM EDT -----  Please call the patient regarding her pelvic ultrasound  Her pelvic ultrasound showed possibly adenomyosis and polycystic ovaries  These diagnoses may lead to heavy periods and cramps and irregular periods  The treatment and I suggested with oral contraceptives may help let us know if  this has  made a difference

## 2018-08-20 NOTE — TELEPHONE ENCOUNTER
Left voicemail message today @ [phone] for Pt to call back office regarding pelvic ultrasound result

## 2018-09-12 ENCOUNTER — OFFICE VISIT (OUTPATIENT)
Dept: BEHAVIORAL/MENTAL HEALTH CLINIC | Facility: CLINIC | Age: 43
End: 2018-09-12
Payer: MEDICARE

## 2018-09-12 DIAGNOSIS — F90.0 ADHD, PREDOMINANTLY INATTENTIVE TYPE: Primary | Chronic | ICD-10-CM

## 2018-09-12 DIAGNOSIS — F31.64 BIPOLAR I DISORDER, MOST RECENT EPISODE MIXED, SEVERE WITH PSYCHOTIC FEATURES (HCC): Chronic | ICD-10-CM

## 2018-09-12 DIAGNOSIS — F41.1 GAD (GENERALIZED ANXIETY DISORDER): Chronic | ICD-10-CM

## 2018-09-12 DIAGNOSIS — F19.11 DRUG ABUSE IN REMISSION (HCC): ICD-10-CM

## 2018-09-12 PROCEDURE — 90791 PSYCH DIAGNOSTIC EVALUATION: CPT | Performed by: SOCIAL WORKER

## 2018-09-12 NOTE — PSYCH
Assessment/Plan: I reached a plateau with my previous therapist  He was Dr Claryce Castleman  I was diagnosed with Bipolar 1  It is mixed it use to be more manic but now it is more depression  I also have ADHD  Subjective:      Patient ID: Domingo Lynch is a 38 y o  female  HPI:     Pre-morbid level of function and History of Present Illness: At age 27 around 82 I was diagnosed by a doctor in the hospital at the [address] [place] [address]  Previous Psychiatric/psychological treatment/year: Has had previous treatment and I have had four inpatients most recent 8 years ago  Current Psychiatrist/Therapist: Sees Dr Syed Lim less than a year  Outpatient and/or Partial and Other Community Resources Used I live with my  and  since April  I have 2 children and two step children with one of them living with us  Problem Assessment: I am currently functionally depressed with fleeting ideation  SOCIAL/VOCATION:  Family Constellation (include parents, relationship with each and pertinent Psych/Medical History): parents are alive but  22 years  I brother and 1 adopted sister and 2 step brothers  Maternal great grandmother and maternal uncle had schizophrenia  He completed suicide  Mother: Leobardo Love 64  Spouse: Monisha Taveras age 36  Father: Raymond Thompson 64   Children: [address] age 7 their dad  at 40 from a heart attack  2 stepchildren    Sibling: really only talks to sister Jailene Wiley  Sibling:n/a   Children: n/a   Other: n/a    Gemini Pretty relates best to my  she lives with  she does not live alone  Domestic Violence: No past history of domestic violence    Additional Comments related to family/relationships/peer support: supportive  School or Work History (strengths/limitations/needs):  Intelligent, focused, persistent, organized     Needs- lessen depression, commuicate my needs better, anger management    Her highest grade level achieved was Bachelors in psych and going for MSW      history includes n/a    Financial status includes recent struggles    LEISURE ASSESSMENT (Include past and present hobbies/interests and level of involvement (Ex: Group/Club Affiliations): sons sports programs  english primary language is [place]  Preferred language is [place]  Ethnic considerations are n/a*Religions affiliations and level of involvement n/a  Does spirituality help you cope? Yes     FUNCTIONAL STATUS: There has been a recent change in Epifanio Zacarias ability to do the following: n/a    Level of Assistance Needed/By Whom?: n/a    Epifanio Zacarias learns best by  reading and writing    SUBSTANCE ABUSE ASSESSMENT: no substance abuse 16 years sober but coke was drug of choice  Substance/Route/Age/Amount/Frequency/Last Use: n/a    DETOX HISTORY: n/a    Previous detox/rehab treatment: n/a    HEALTH ASSESSMENT: no referral to PCP needed    LEGAL: n/a    Prenatal History: 2nd child was emergency c section,     Delivery History: N/A    Developmental Milestones: N/A  Temperament as an infant was N/A  Temperament as a toddler was N/A  Temperament at school age was N/A  Temperament as a teenager was N/A      Risk Assessment:   The following ratings are based on my interview(s) with the client    Risk of Harm to Self:   Demographic risk factors include ideation but no plan or intent  Historical Risk Factors include a relative or close friend who  by suicide, chronic psychiatric problems, history of suicidal behaviors/attempts, history of impulsive behaviors and made several attempts in the past  Recent Specific Risk Factors include experienced fleeting ideation and feelings of guilt or self blame  Additional Factors for a Child or Adolescent n/a    Risk of Harm to Others:   Demographic Risk Factors include n/a  Historical Risk Factors include lost  6 years ago  Recent Specific Risk Factors include concomitant mood or thought disorder and multiple stressors    Access to Weapons:   Epifanio Zacarias has access to the following weapons: in the home they are locked up  The following steps have been taken to ensure weapons are properly secured: she does not know where the key is    Based on the above information, the client presents the following risk of harm to self or others:  low    The following interventions are recommended:   consultation with dr Melvi Thomas as needed       Notes regarding this Risk Assessment: low risk        Review Of Systems:     Mood Depression   Behavior Normal    Thought Content Normal   General Sleep Disturbances   Personality Normal   Other Psych Symptoms Normal   Constitutional Normal   ENT Normal   Cardiovascular SVT   Respiratory Normal    Gastrointestinal Gerd and cholesetectomy   Genitourinary PCOS   Musculoskeletal n/a   Integumentary Normal    Neurological Normal    Endocrine Normal          Mental status:  Appearance calm and cooperative , adequate hygiene and grooming and good eye contact    Mood depressed   Affect affect was broad   Speech a normal rate   Thought Processes normal thought processes   Hallucinations no hallucinations present    Thought Content no delusions   Abnormal Thoughts passive/fleeting thoughts of suicide   Orientation  oriented to person, oriented to place and oriented to time   Remote Memory short term memory impaired and long term memory impaired   Attention Span concentration impaired   Intellect Appears to be Above Average Intelligence   Fund of Knowledge displays adequate knowledge of current events, adequate fund of knowledge regarding past history and adequate fund of knowledge regarding vocabulary    Insight Insight intact   Judgement judgment was intact   Muscle Strength Muscle strength and tone were normal and Normal gait    Language no difficulty naming common objects, no difficulty repeating a phrase  and no difficulty writing a sentence    Pain moderate to severe   Pain Scale 0

## 2018-09-25 ENCOUNTER — OFFICE VISIT (OUTPATIENT)
Dept: BEHAVIORAL/MENTAL HEALTH CLINIC | Facility: CLINIC | Age: 43
End: 2018-09-25
Payer: MEDICARE

## 2018-09-25 ENCOUNTER — TELEPHONE (OUTPATIENT)
Dept: BEHAVIORAL/MENTAL HEALTH CLINIC | Facility: CLINIC | Age: 43
End: 2018-09-25

## 2018-09-25 DIAGNOSIS — F41.1 GAD (GENERALIZED ANXIETY DISORDER): Chronic | ICD-10-CM

## 2018-09-25 DIAGNOSIS — F31.64 BIPOLAR I DISORDER, MOST RECENT EPISODE MIXED, SEVERE WITH PSYCHOTIC FEATURES (HCC): Chronic | ICD-10-CM

## 2018-09-25 DIAGNOSIS — F19.11 DRUG ABUSE IN REMISSION (HCC): ICD-10-CM

## 2018-09-25 DIAGNOSIS — F90.0 ADHD, PREDOMINANTLY INATTENTIVE TYPE: Primary | Chronic | ICD-10-CM

## 2018-09-25 PROCEDURE — 90834 PSYTX W PT 45 MINUTES: CPT | Performed by: SOCIAL WORKER

## 2018-09-25 NOTE — PSYCH
Treatment Plan Tracking    # The initialTreatment Plan not completed within required time limits due to:the fact there was no time  Client presented with emotional/behavorial issues that required clinical intervention  in the initial assessmentand the same thing happened in the first session

## 2018-09-25 NOTE — TELEPHONE ENCOUNTER
Dr Radha Carpenter,    Spoke w/pt, accepted appt for tomorrow 9/26 @ 2pm   Added pt to your schedule

## 2018-09-25 NOTE — TELEPHONE ENCOUNTER
Patient was in to see Jimbo today and states she is feeling more and more depressed  She feels her medication is not working and would like to speak to you about that
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
## 2018-09-25 NOTE — PSYCH
Psychotherapy Provided: Individual Psychotherapy 45 minutes     Length of time in session: 45 minutes, follow up in 2 week    Goals addressed in session: Goal 1, Goal 2 and Goal 3      Pain:      moderate to severe    0    Current suicide risk : Low     Data: The client reports her depression is worsening and she is doing a lot of oversleeping  She has suicidal ideation  And a plan but denies total intent  She reports they use to be mixed episodes but now they are just pure depression  We discussed partial and she said she would have to check with school  She claims she is manic in April and usually depressed in September  Today is her anniversary with her first   She claims things are good in her life and with her current   I will leave a note with the nurse for her concern that her medications are not working  She will consider coming into our partial  However in the mean time she does not see our MD till next month and she is stating her medications are not working  We discussed her emotions and discussed mindfulness strategies  Assessment: The client was in tears and her mood appeared very unstable  Plan: I will notify the MD and the nurse  Behavioral Health Treatment Plan [facility]: Diagnosis and Treatment Plan explained to Carmen Landaverde relates understanding diagnosis and is agreeable to Treatment Plan   Yes

## 2018-09-26 ENCOUNTER — OFFICE VISIT (OUTPATIENT)
Dept: PSYCHIATRY | Facility: CLINIC | Age: 43
End: 2018-09-26
Payer: MEDICARE

## 2018-09-26 ENCOUNTER — HOSPITAL ENCOUNTER (INPATIENT)
Facility: HOSPITAL | Age: 43
LOS: 5 days | Discharge: HOME | DRG: 885 | End: 2018-10-01
Attending: EMERGENCY MEDICINE | Admitting: PSYCHIATRY & NEUROLOGY
Payer: MEDICARE

## 2018-09-26 VITALS
HEART RATE: 81 BPM | SYSTOLIC BLOOD PRESSURE: 125 MMHG | WEIGHT: 247 LBS | HEIGHT: 63 IN | BODY MASS INDEX: 43.77 KG/M2 | DIASTOLIC BLOOD PRESSURE: 79 MMHG

## 2018-09-26 DIAGNOSIS — F32.A DEPRESSION WITH SUICIDAL IDEATION: ICD-10-CM

## 2018-09-26 DIAGNOSIS — G25.9 EXTRAPYRAMIDAL REACTION: ICD-10-CM

## 2018-09-26 DIAGNOSIS — F41.1 GAD (GENERALIZED ANXIETY DISORDER): Chronic | ICD-10-CM

## 2018-09-26 DIAGNOSIS — G47.00 INSOMNIA: ICD-10-CM

## 2018-09-26 DIAGNOSIS — F90.0 ADHD, PREDOMINANTLY INATTENTIVE TYPE: Chronic | ICD-10-CM

## 2018-09-26 DIAGNOSIS — R45.851 SUICIDAL IDEATIONS: ICD-10-CM

## 2018-09-26 DIAGNOSIS — F31.4 BIPOLAR I DISORDER, MOST RECENT EPISODE DEPRESSED, SEVERE WITHOUT PSYCHOTIC FEATURES (HCC): Primary | Chronic | ICD-10-CM

## 2018-09-26 DIAGNOSIS — R45.851 DEPRESSION WITH SUICIDAL IDEATION: ICD-10-CM

## 2018-09-26 LAB
AMPHETAMINES SERPL QL SCN: NEGATIVE
BARBITURATES UR QL: NEGATIVE
BENZODIAZ UR QL: NEGATIVE
COCAINE UR QL: NEGATIVE
ETHANOL EXG-MCNC: 0 MG/DL
EXT PREG TEST URINE: NEGATIVE
METHADONE UR QL: NEGATIVE
OPIATES UR QL SCN: NEGATIVE
PCP UR QL: NEGATIVE
THC UR QL: NEGATIVE

## 2018-09-26 PROCEDURE — 90833 PSYTX W PT W E/M 30 MIN: CPT | Performed by: PSYCHIATRY & NEUROLOGY

## 2018-09-26 PROCEDURE — 80307 DRUG TEST PRSMV CHEM ANLYZR: CPT | Performed by: EMERGENCY MEDICINE

## 2018-09-26 PROCEDURE — 99213 OFFICE O/P EST LOW 20 MIN: CPT | Performed by: PSYCHIATRY & NEUROLOGY

## 2018-09-26 PROCEDURE — 82075 ASSAY OF BREATH ETHANOL: CPT | Performed by: EMERGENCY MEDICINE

## 2018-09-26 PROCEDURE — 81025 URINE PREGNANCY TEST: CPT | Performed by: EMERGENCY MEDICINE

## 2018-09-26 PROCEDURE — 99285 EMERGENCY DEPT VISIT HI MDM: CPT

## 2018-09-26 RX ORDER — LORAZEPAM 0.5 MG/1
1 TABLET ORAL ONCE
Status: COMPLETED | OUTPATIENT
Start: 2018-09-26 | End: 2018-09-26

## 2018-09-26 RX ORDER — BENZTROPINE MESYLATE 1 MG/ML
1 INJECTION INTRAMUSCULAR; INTRAVENOUS EVERY 6 HOURS PRN
Status: DISCONTINUED | OUTPATIENT
Start: 2018-09-26 | End: 2018-09-26

## 2018-09-26 RX ORDER — OLANZAPINE 10 MG/1
10 INJECTION, POWDER, LYOPHILIZED, FOR SOLUTION INTRAMUSCULAR
Status: DISCONTINUED | OUTPATIENT
Start: 2018-09-26 | End: 2018-09-26

## 2018-09-26 RX ORDER — HALOPERIDOL 5 MG/ML
5 INJECTION INTRAMUSCULAR EVERY 6 HOURS PRN
Status: DISCONTINUED | OUTPATIENT
Start: 2018-09-26 | End: 2018-09-26

## 2018-09-26 RX ORDER — LORAZEPAM 1 MG/1
1 TABLET ORAL EVERY 6 HOURS PRN
Status: DISCONTINUED | OUTPATIENT
Start: 2018-09-26 | End: 2018-09-26

## 2018-09-26 RX ORDER — BENZTROPINE MESYLATE 1 MG/ML
1 INJECTION INTRAMUSCULAR; INTRAVENOUS EVERY 6 HOURS PRN
Status: DISCONTINUED | OUTPATIENT
Start: 2018-09-26 | End: 2018-10-01 | Stop reason: HOSPADM

## 2018-09-26 RX ORDER — HALOPERIDOL 5 MG
5 TABLET ORAL EVERY 6 HOURS PRN
Status: DISCONTINUED | OUTPATIENT
Start: 2018-09-26 | End: 2018-09-26

## 2018-09-26 RX ORDER — LORAZEPAM 1 MG/1
1 TABLET ORAL EVERY 6 HOURS PRN
Status: DISCONTINUED | OUTPATIENT
Start: 2018-09-26 | End: 2018-10-01 | Stop reason: HOSPADM

## 2018-09-26 RX ORDER — MELATONIN
2000 DAILY
Status: DISCONTINUED | OUTPATIENT
Start: 2018-09-27 | End: 2018-10-01 | Stop reason: HOSPADM

## 2018-09-26 RX ORDER — HALOPERIDOL 5 MG
5 TABLET ORAL EVERY 6 HOURS PRN
Status: DISCONTINUED | OUTPATIENT
Start: 2018-09-26 | End: 2018-10-01 | Stop reason: HOSPADM

## 2018-09-26 RX ORDER — ARIPIPRAZOLE 15 MG/1
15 TABLET ORAL
Status: DISCONTINUED | OUTPATIENT
Start: 2018-09-26 | End: 2018-10-01 | Stop reason: HOSPADM

## 2018-09-26 RX ORDER — ATOMOXETINE 40 MG/1
40 CAPSULE ORAL DAILY
Status: DISCONTINUED | OUTPATIENT
Start: 2018-09-27 | End: 2018-10-01 | Stop reason: HOSPADM

## 2018-09-26 RX ORDER — HALOPERIDOL 5 MG/ML
5 INJECTION INTRAMUSCULAR EVERY 6 HOURS PRN
Status: DISCONTINUED | OUTPATIENT
Start: 2018-09-26 | End: 2018-10-01 | Stop reason: HOSPADM

## 2018-09-26 RX ORDER — FAMOTIDINE 20 MG
2000 TABLET ORAL
COMMUNITY
End: 2020-11-11 | Stop reason: ALTCHOICE

## 2018-09-26 RX ORDER — OLANZAPINE 10 MG/1
10 INJECTION, POWDER, LYOPHILIZED, FOR SOLUTION INTRAMUSCULAR
Status: DISCONTINUED | OUTPATIENT
Start: 2018-09-26 | End: 2018-10-01 | Stop reason: HOSPADM

## 2018-09-26 RX ORDER — TRAZODONE HYDROCHLORIDE 50 MG/1
50 TABLET ORAL
Status: DISCONTINUED | OUTPATIENT
Start: 2018-09-26 | End: 2018-10-01 | Stop reason: HOSPADM

## 2018-09-26 RX ORDER — LORAZEPAM 2 MG/ML
1 INJECTION INTRAMUSCULAR EVERY 6 HOURS PRN
Status: DISCONTINUED | OUTPATIENT
Start: 2018-09-26 | End: 2018-10-01 | Stop reason: HOSPADM

## 2018-09-26 RX ORDER — BENZTROPINE MESYLATE 1 MG/1
1 TABLET ORAL EVERY 6 HOURS PRN
Status: DISCONTINUED | OUTPATIENT
Start: 2018-09-26 | End: 2018-09-26

## 2018-09-26 RX ORDER — RISPERIDONE 1 MG/1
1 TABLET, ORALLY DISINTEGRATING ORAL
Status: DISCONTINUED | OUTPATIENT
Start: 2018-09-26 | End: 2018-10-01 | Stop reason: HOSPADM

## 2018-09-26 RX ORDER — HYDROXYZINE HYDROCHLORIDE 25 MG/1
25 TABLET, FILM COATED ORAL EVERY 6 HOURS PRN
Status: DISCONTINUED | OUTPATIENT
Start: 2018-09-26 | End: 2018-10-01 | Stop reason: HOSPADM

## 2018-09-26 RX ORDER — LAMOTRIGINE 100 MG/1
200 TABLET ORAL
Status: DISCONTINUED | OUTPATIENT
Start: 2018-09-26 | End: 2018-09-27

## 2018-09-26 RX ORDER — BENZTROPINE MESYLATE 1 MG/1
1 TABLET ORAL EVERY 6 HOURS PRN
Status: DISCONTINUED | OUTPATIENT
Start: 2018-09-26 | End: 2018-10-01 | Stop reason: HOSPADM

## 2018-09-26 RX ORDER — ACETAMINOPHEN 325 MG/1
650 TABLET ORAL EVERY 6 HOURS PRN
Status: DISCONTINUED | OUTPATIENT
Start: 2018-09-26 | End: 2018-10-01 | Stop reason: HOSPADM

## 2018-09-26 RX ORDER — LORAZEPAM 1 MG/1
1 TABLET ORAL 2 TIMES DAILY
Status: DISCONTINUED | OUTPATIENT
Start: 2018-09-26 | End: 2018-10-01 | Stop reason: HOSPADM

## 2018-09-26 RX ORDER — IBUPROFEN 600 MG/1
600 TABLET ORAL EVERY 8 HOURS PRN
Status: DISCONTINUED | OUTPATIENT
Start: 2018-09-26 | End: 2018-10-01 | Stop reason: HOSPADM

## 2018-09-26 RX ORDER — MAGNESIUM HYDROXIDE/ALUMINUM HYDROXICE/SIMETHICONE 120; 1200; 1200 MG/30ML; MG/30ML; MG/30ML
30 SUSPENSION ORAL EVERY 4 HOURS PRN
Status: DISCONTINUED | OUTPATIENT
Start: 2018-09-26 | End: 2018-10-01 | Stop reason: HOSPADM

## 2018-09-26 RX ORDER — LORAZEPAM 2 MG/ML
2 INJECTION INTRAMUSCULAR EVERY 6 HOURS PRN
Status: DISCONTINUED | OUTPATIENT
Start: 2018-09-26 | End: 2018-09-26

## 2018-09-26 RX ORDER — IBUPROFEN 400 MG/1
800 TABLET ORAL EVERY 8 HOURS PRN
Status: DISCONTINUED | OUTPATIENT
Start: 2018-09-26 | End: 2018-10-01 | Stop reason: HOSPADM

## 2018-09-26 RX ADMIN — LAMOTRIGINE 200 MG: 100 TABLET ORAL at 21:44

## 2018-09-26 RX ADMIN — LORAZEPAM 1 MG: 0.5 TABLET ORAL at 16:10

## 2018-09-26 RX ADMIN — ARIPIPRAZOLE 15 MG: 15 TABLET ORAL at 21:44

## 2018-09-26 RX ADMIN — LORAZEPAM 1 MG: 1 TABLET ORAL at 21:57

## 2018-09-26 NOTE — PSYCH
When I saw the client yesterday she said she had intermittent ideation  However when I asked about a plan and intent she said she always her whole life if she were to do it had the same plan however She clearly said she had no intent and would never do it due to her children and her   She will consider PHP but she refused inpatient hospital  She denied intent

## 2018-09-26 NOTE — ED ATTENDING ATTESTATION
Aditya Cardenas MD, saw and evaluated the patient  I have discussed the patient with the resident/non-physician practitioner and agree with the resident's/non-physician practitioner's findings, Plan of Care, and MDM as documented in the resident's/non-physician practitioner's note, except where noted  All available labs and Radiology studies were reviewed  At this point I agree with the current assessment done in the Emergency Department  I have conducted an independent evaluation of this patient a history and physical is as follows:    46-year-old woman with history of bipolar disorder sent to the emergency department by her psychiatrist   The patient has been having suicidal thoughts lately and her psychiatrist was concerned enough today that she asked the patient to sign a 201, and informed the patient that a 302 would be initiated otherwise  The patient did sign a 201 and presents to the emergency department for crisis evaluation and placement  The patient tells me that she has been having self-harm thoughts although she does not have a specific plan  Denies homicidal ideation  Denies any medical complaints at this time  Vital signs reviewed  On examination the patient is awake and alert, in no acute distress  Head is atraumatic and normocephalic  Pupils equal and reactive bilaterally, normal conjunctiva  Oropharynx clear  Moist mucous membranes  Neck is supple  Heart regular rate and rhythm, no murmurs, rubs or gallops  Lungs clear to auscultation bilaterally with no respiratory distress  Abdomen soft, nontender, nondistended  Extremities with no edema and normal range of motion  Nonfocal neuro  Skin warm, dry, intact  Normal mood and affect  Assessment and plan:  46-year-old woman here for crisis evaluation placement  201 signed, placement pending        Critical Care Time  CritCare Time    Procedures

## 2018-09-26 NOTE — ED NOTES
Crisis Worker called  Leticia Plascencia) who stated the patient the firearms are locked in a safe and the patient has no access to the safe or keys that would open the safe

## 2018-09-26 NOTE — ED NOTES
Insurance Authorization:   Phone call placed to Brandon Hanson number:[phone] [id number]   Spoke to [address] [facility]      6 days approved    Level of care: Inpatient Mental Health  Review on 10/1  Authorization # Kelly VOGT to call on 10/1: [phone]
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
## 2018-09-26 NOTE — ED NOTES
Patient is accepted at [facility]  Patient is accepted by Dr Becki Haddad per [place]    Patient cannot go up until later         Nurse report is to be called to [phone] prior to patient transfer

## 2018-09-27 LAB
ALBUMIN SERPL BCP-MCNC: 3.3 G/DL (ref 3.5–5)
ALP SERPL-CCNC: 103 U/L (ref 46–116)
ALT SERPL W P-5'-P-CCNC: 40 U/L (ref 12–78)
ANION GAP SERPL CALCULATED.3IONS-SCNC: 8 MMOL/L (ref 4–13)
AST SERPL W P-5'-P-CCNC: 19 U/L (ref 5–45)
BASOPHILS # BLD AUTO: 0.03 THOUSANDS/ΜL (ref 0–0.1)
BASOPHILS NFR BLD AUTO: 0 % (ref 0–1)
BILIRUB SERPL-MCNC: 0.33 MG/DL (ref 0.2–1)
BUN SERPL-MCNC: 9 MG/DL (ref 5–25)
CALCIUM SERPL-MCNC: 8.9 MG/DL (ref 8.3–10.1)
CHLORIDE SERPL-SCNC: 104 MMOL/L (ref 100–108)
CHOLEST SERPL-MCNC: 202 MG/DL (ref 50–200)
CO2 SERPL-SCNC: 26 MMOL/L (ref 21–32)
CREAT SERPL-MCNC: 0.87 MG/DL (ref 0.6–1.3)
EOSINOPHIL # BLD AUTO: 0.11 THOUSAND/ΜL (ref 0–0.61)
EOSINOPHIL NFR BLD AUTO: 1 % (ref 0–6)
ERYTHROCYTE [DISTWIDTH] IN BLOOD BY AUTOMATED COUNT: 13.1 % (ref 11.6–15.1)
GFR SERPL CREATININE-BSD FRML MDRD: 84 ML/MIN/1.73SQ M
GLUCOSE P FAST SERPL-MCNC: 97 MG/DL (ref 65–99)
GLUCOSE SERPL-MCNC: 97 MG/DL (ref 65–140)
HCG SERPL QL: NEGATIVE
HCT VFR BLD AUTO: 37.4 % (ref 34.8–46.1)
HDLC SERPL-MCNC: 44 MG/DL (ref 40–60)
HGB BLD-MCNC: 12.2 G/DL (ref 11.5–15.4)
IMM GRANULOCYTES # BLD AUTO: 0.03 THOUSAND/UL (ref 0–0.2)
IMM GRANULOCYTES NFR BLD AUTO: 0 % (ref 0–2)
LDLC SERPL CALC-MCNC: 146 MG/DL (ref 0–100)
LYMPHOCYTES # BLD AUTO: 1.79 THOUSANDS/ΜL (ref 0.6–4.47)
LYMPHOCYTES NFR BLD AUTO: 23 % (ref 14–44)
MCH RBC QN AUTO: 28.1 PG (ref 26.8–34.3)
MCHC RBC AUTO-ENTMCNC: 32.6 G/DL (ref 31.4–37.4)
MCV RBC AUTO: 86 FL (ref 82–98)
MONOCYTES # BLD AUTO: 0.49 THOUSAND/ΜL (ref 0.17–1.22)
MONOCYTES NFR BLD AUTO: 6 % (ref 4–12)
NEUTROPHILS # BLD AUTO: 5.3 THOUSANDS/ΜL (ref 1.85–7.62)
NEUTS SEG NFR BLD AUTO: 70 % (ref 43–75)
NONHDLC SERPL-MCNC: 158 MG/DL
NRBC BLD AUTO-RTO: 0 /100 WBCS
PLATELET # BLD AUTO: 326 THOUSANDS/UL (ref 149–390)
PMV BLD AUTO: 10.1 FL (ref 8.9–12.7)
POTASSIUM SERPL-SCNC: 4.2 MMOL/L (ref 3.5–5.3)
PROT SERPL-MCNC: 7.4 G/DL (ref 6.4–8.2)
RBC # BLD AUTO: 4.34 MILLION/UL (ref 3.81–5.12)
RPR SER QL: NORMAL
SODIUM SERPL-SCNC: 138 MMOL/L (ref 136–145)
TRIGL SERPL-MCNC: 58 MG/DL
TSH SERPL DL<=0.05 MIU/L-ACNC: 1.18 UIU/ML (ref 0.36–3.74)
WBC # BLD AUTO: 7.75 THOUSAND/UL (ref 4.31–10.16)

## 2018-09-27 PROCEDURE — 86592 SYPHILIS TEST NON-TREP QUAL: CPT | Performed by: PSYCHIATRY & NEUROLOGY

## 2018-09-27 PROCEDURE — 84703 CHORIONIC GONADOTROPIN ASSAY: CPT | Performed by: NURSE PRACTITIONER

## 2018-09-27 PROCEDURE — 84443 ASSAY THYROID STIM HORMONE: CPT | Performed by: PSYCHIATRY & NEUROLOGY

## 2018-09-27 PROCEDURE — 85025 COMPLETE CBC W/AUTO DIFF WBC: CPT | Performed by: NURSE PRACTITIONER

## 2018-09-27 PROCEDURE — 99222 1ST HOSP IP/OBS MODERATE 55: CPT | Performed by: PSYCHIATRY & NEUROLOGY

## 2018-09-27 PROCEDURE — 80053 COMPREHEN METABOLIC PANEL: CPT | Performed by: NURSE PRACTITIONER

## 2018-09-27 PROCEDURE — 80061 LIPID PANEL: CPT | Performed by: NURSE PRACTITIONER

## 2018-09-27 RX ORDER — CARBAMAZEPINE 200 MG/1
100 TABLET ORAL 2 TIMES DAILY
Status: DISCONTINUED | OUTPATIENT
Start: 2018-09-27 | End: 2018-09-29

## 2018-09-27 RX ORDER — LAMOTRIGINE 100 MG/1
300 TABLET ORAL
Status: DISCONTINUED | OUTPATIENT
Start: 2018-09-27 | End: 2018-09-27

## 2018-09-27 RX ADMIN — ARIPIPRAZOLE 15 MG: 15 TABLET ORAL at 21:17

## 2018-09-27 RX ADMIN — LORAZEPAM 1 MG: 1 TABLET ORAL at 17:24

## 2018-09-27 RX ADMIN — CARBAMAZEPINE 100 MG: 200 TABLET ORAL at 17:24

## 2018-09-27 RX ADMIN — LAMOTRIGINE 250 MG: 100 TABLET ORAL at 21:16

## 2018-09-27 RX ADMIN — ATOMOXETINE 40 MG: 40 CAPSULE ORAL at 08:54

## 2018-09-27 RX ADMIN — ACETAMINOPHEN 650 MG: 325 TABLET, FILM COATED ORAL at 10:55

## 2018-09-27 RX ADMIN — VITAMIN D, TAB 1000IU (100/BT) 2000 UNITS: 25 TAB at 08:54

## 2018-09-27 RX ADMIN — CARBAMAZEPINE 100 MG: 200 TABLET ORAL at 11:20

## 2018-09-27 RX ADMIN — LORAZEPAM 1 MG: 1 TABLET ORAL at 08:54

## 2018-09-27 NOTE — H&P
Chief Complaint: suicidal ideation    HPI: Shay Whitmore is a 38 y o  female who presents with depression and suicidal ideation   Admitted for psych evaluation and treatment    Review of Systems:  General: negative  Cardiovascular: no chest pain or dyspnea on exertion  Respiratory: no cough, shortness of breath, or wheezing  Gastrointestinal: no abdominal pain, change in bowel habits, or black or bloody stools  Genitourinary ROS: no dysuria, trouble voiding, or hematuria  Musculoskeletal ROS: left forearm decreased strength and sensation s/p surgery   Neurological ROS: no TIA or stroke symptoms  Hematological and Lymphatic ROS: negative  Dermatological ROS: negative  Psychological ROS: positive for - depression and suicidal ideation  Ophthalmic ROS: negative  ENT ROS: negative    Past Medical History:  Past Medical History:   Diagnosis Date    Acute pancreatitis     Last Assessed: 2016    ADHD (attention deficit hyperactivity disorder)     Anemia     Anxiety     Arthralgia     B[facility] disorder ([facility] )     Concussion     Last Assessed: 2012    Endometriosis     Esophageal reflux     Familial combined hyperlipidemia     Last Assessed: 2013    Gastropathy     Head injury     History of sinusitis     Irregular heart beat     Migraine     Last Assessed: 2012    Paroxysmal supraventricular tachycardia ([facility] )     Peptic ulcer disease     Spontaneous      Substance abuse     Suicide attempt ([facility] )     UTI (urinary tract infection)     Vitamin D deficiency        Past Surgical History:  Past Surgical History:   Procedure Laterality Date     SECTION      FOREARM SURGERY Left     INDUCED       x3    LAPAROSCOPIC CHOLECYSTECTOMY      Last Assessed: 2016    LAPAROSCOPY      Exploratory    TONSILLECTOMY      Onset: [id number]       Social History:  History   Alcohol Use    Yes     Comment: Social alcohol     History   Drug Use No     Comment: Past cocaine and marijuana use for 2 years until 2001  Also tried LSD years ago  No current recreational drug use     History   Smoking Status    Former Smoker    Quit date: 3/1/2015   Smokeless Tobacco    Never Used     Comment: Quit       Family History:  Family History   Problem Relation Age of Onset    Hyperthyroidism Mother     Thyroid disease Mother     Depression Father     Hypertension Father     Obesity Father     Other Paternal Grandmother         Cardiac Disorder    Dementia Paternal Grandmother     Hypertension Paternal Grandmother     Hypertension Paternal Grandfather     Other Paternal Grandfather         Cardiac Disorder    Schizophrenia Other     Schizophrenia Other     Suicidality Other     Completed Suicide  Other     Breast cancer Other        Allergies: Allergies   Allergen Reactions    Geodon [Ziprasidone]      Nystagmus    Other      Seasonal allergies    Quetiapine      Other reaction(s):  Other (See Comments)  Per pt low blood pressure and fainting       Medications:  current meds:   Current Facility-Administered Medications   Medication Dose Route Frequency    acetaminophen (TYLENOL) tablet 650 mg  650 mg Oral Q6H PRN    aluminum-magnesium hydroxide-simethicone (MYLANTA) 200-200-20 mg/5 mL oral suspension 30 mL  30 mL Oral Q4H PRN    ARIPiprazole (ABILIFY) tablet 15 mg  15 mg Oral HS    [START ON 9/27/2018] atoMOXetine (STRATTERA) capsule 40 mg  40 mg Oral Daily    benztropine (COGENTIN) injection 1 mg  1 mg Intramuscular Q6H PRN    benztropine (COGENTIN) tablet 1 mg  1 mg Oral Q6H PRN    [START ON 9/27/2018] cholecalciferol (VITAMIN D3) tablet 2,000 Units  2,000 Units Oral Daily    haloperidol (HALDOL) tablet 5 mg  5 mg Oral Q6H PRN    haloperidol lactate (HALDOL) injection 5 mg  5 mg Intramuscular Q6H PRN    hydrOXYzine HCL (ATARAX) tablet 25 mg  25 mg Oral Q6H PRN    ibuprofen (MOTRIN) tablet 600 mg  600 mg Oral Q8H PRN    ibuprofen (MOTRIN) tablet 800 mg  800 mg Oral Q8H PRN    lamoTRIgine (LaMICtal) tablet 200 mg  200 mg Oral HS    LORazepam (ATIVAN) 2 mg/mL injection 1 mg  1 mg Intramuscular Q6H PRN    LORazepam (ATIVAN) tablet 1 mg  1 mg Oral BID    LORazepam (ATIVAN) tablet 1 mg  1 mg Oral Q6H PRN    magnesium hydroxide (MILK OF MAGNESIA) 400 mg/5 mL oral suspension 30 mL  30 mL Oral Daily PRN    OLANZapine (ZyPREXA) IM injection 10 mg  10 mg Intramuscular Q3H PRN    risperiDONE (RisperDAL M-TABS) dispersible tablet 1 mg  1 mg Oral Q3H PRN    traZODone (DESYREL) tablet 50 mg  50 mg Oral HS PRN       Vitals:  /89 (BP Location: Left arm)   Pulse 92   Temp 98 5 °F (36 9 °C) (Tympanic)   Resp 18   Ht 5' 2" (1 575 m)   Wt 112 kg (246 lb)   SpO2 98%   BMI 44 99 kg/m²     Lab Results and Cultures:   CBC with diff:   Lab Results   Component Value Date    WBC 7 05 05/27/2018    HGB 12 6 05/27/2018    HCT 38 2 05/27/2018    MCV 85 05/27/2018     05/27/2018    MCH 28 0 05/27/2018    MCHC 33 0 05/27/2018    RDW 12 7 05/27/2018    MPV 10 6 05/27/2018    NRBC 0 05/27/2018   ,   BMP/CMP:  Lab Results   Component Value Date     05/27/2018     10/15/2015    K 4 3 05/27/2018    K 4 8 10/15/2015     05/27/2018     10/15/2015    CO2 24 05/27/2018    CO2 27 10/15/2015    ANIONGAP 7 10/15/2015    BUN 11 05/27/2018    BUN 13 10/15/2015    CREATININE 0 85 05/27/2018    CREATININE 0 79 10/15/2015    GLUCOSE 95 10/15/2015    CALCIUM 8 8 05/27/2018    CALCIUM 9 2 10/15/2015    AST 17 05/27/2018    AST 18 10/15/2015    ALT 37 05/27/2018    ALT 46 10/15/2015    ALKPHOS 96 05/27/2018    ALKPHOS 99 10/15/2015    PROT 7 4 10/15/2015    BILITOT 0 27 10/15/2015    EGFR 86 05/27/2018   ,   Lipid Panel:   Lab Results   Component Value Date    CHOL 209 10/15/2015   ,   Coags: No results found for: PT, PTT, INR,     Blood Culture: No results found for: BLOODCX,   Urinalysis:   Lab Results   Component Value Date    COLORU Yellow 10/15/2015    CLARITYU Cloudy 10/15/2015    SPECGRAV 1 012 10/15/2015    PHUR 6 5 10/15/2015    LEUKOCYTESUR Trace (A) 10/15/2015    NITRITE Negative 10/15/2015    PROTEINUA Negative 10/15/2015    GLUCOSEU Negative 10/15/2015    KETONESU Negative 10/15/2015    BILIRUBINUR Negative 10/15/2015    BLOODU Negative 10/15/2015   ,   Urine Culture: No results found for: URINECX,   Wound Culure: No results found for: WOUNDCULT      Physical Exam:    General appearance: alert and oriented, in no acute distress  Head: Normocephalic, without obvious abnormality, atraumatic  Eyes: conjunctivae/corneas clear  PERRL, EOM's intact  Fundi benign    Throat: lips, mucosa, and tongue normal; teeth and gums normal  Neck: supple, symmetrical, trachea midline  Lungs: clear to auscultation bilaterally  Heart: regular rate and rhythm, S1, S2 normal, no murmur, click, rub or gallop  Abdomen: soft, non-tender; bowel sounds normal; no masses,  no organomegaly  Genitalia: not applicable  Rectal: not applicable  Extremities: extremities normal, warm and well-perfused; no cyanosis, clubbing, or edema  Skin: Skin color, texture, turgor normal  No rashes or lesions  Neurologic: Grossly normal    Assessment:  39 yo female with depression and suicidal ideation  Plan:  Admit for psychiatric evaluation and treatment

## 2018-09-27 NOTE — PROGRESS NOTES
Pt denies SI  She states she has intrusive, unpredictable suicidal thoughts off and on, and that this time of year is a trigger for her  Pt was tearful at times, but cooperative, pleasant  She realizes she has to stay until Monday and is willing to do this  She states, "I don't think about my depression  If I think about it, it will get worse " Pt has been social and appropriate in the milieu

## 2018-09-27 NOTE — PLAN OF CARE
Problem: SELF HARM/SUICIDALITY  Goal: Will have no self-injury during hospital stay  INTERVENTIONS:  - Q 15 MINUTES: Routine safety checks  - Q WAKING SHIFT & PRN: Assess risk to determine if routine checks are adequate to maintain patient safety  - Encourage patient to participate actively in care by formulating a plan to combat response to suicidal ideation, identify supports and resources   Outcome: Not Progressing

## 2018-09-27 NOTE — PROGRESS NOTES
Pt adm on 201 from our ER   Pt had scheduled appointment with Dr Anderson Seen at out out patient clinic and had suicidal ideation with plans  Pt came up to unit very angry that she was admitted  Pt stated on adm "  I saw my therapist, Quincy Kevin yesterday and told him that I wanted to go to the partial program but today I came to see Dr Anderson Seen and there was ambulance waiting to bring me to the ER because Dr Anderson Seen over reacted  I has suicidal ideation but no intent and  intrusive thoughts but no self harm thoughts  I has obsessive thoughts and I do not need to be in pt "  Pt on adm signed 72 hr notice and was angry  Pt denied SI on admission and stated " All I am now is angry that I am here  I want to be d/c by Friday "  Pt's  presently on unit and he did verify that guns are secured and Luther Vazquez does not have access those guns  Pt lives with  and 2 children

## 2018-09-27 NOTE — CASE MANAGEMENT
Pt rescinded her 72 hour notice on 9/27/18  Spoke with pt's  to establish contact with him (pt signed COLT)   appreciated the contact and was given a brief update/status of the pt at this point   states he will come to see the patient this evening  As of this time, psychiatrist states the pt will be discharged this coming Monday, 10/1/18

## 2018-09-27 NOTE — TREATMENT PLAN
TREATMENT PLAN REVIEW - [address] Adamaris Willson 38 y o  1975 female MRN: [id number]    [address] [place] [address] Room / Bed: Cy Lehman 399-82 Encounter: [id number]        Admit Date/Time:  9/26/2018  2:53 PM    Treatment Team: Attending Provider: Kristy Dominguez MD; Patient Care Technician: Naga Rivera; Occupational Therapy Assistant: LUANNE Cole; Patient Care Technician: Trav Rodriguez; Registered Nurse: Vale Gonzáles RN; Patient Care Technician: Mely Mathias; Patient Care Technician: Yuliya Jane;  : Vicente Jain RN; Patient Care Assistant: Gerri Lambert; Patient Care Technician: Bernie Reece    Diagnosis: Principal Problem:    Bipolar I disorder, most recent episode depressed, severe without psychotic features ([facility] [address] )  Active Problems:    SANA (generalized anxiety disorder)    ADHD, predominantly inattentive type    Patient Strengths/Assets: general fund of knowledge, patient is on a voluntary commitment, supportive family      Patient Barriers/Limitations: difficulty adapting, recent job loss    Short Term Goals: decrease in depressive symptoms, decrease in anxiety symptoms, decrease in suicidal thoughts    Long Term Goals: improvement in anxiety, resolution of depressive symptoms, free of suicidal thoughts    Progress Towards Goals: restarting psychiatric medications as prescribed    Recommended Treatment: medication management, patient medication education, group therapy, milieu therapy, continued Behavioral Health psychiatric evaluation/assessment process     Treatment Frequency: daily medication monitoring, group and milieu therapy daily, monitoring through interdisciplinary rounds, monitoring through weekly patient care conferences    Expected Discharge Date: 5 days - 10/2/2018    Discharge Plan: referral to partial hospitalization program, return to previous living arrangement    Treatment Plan Created/Updated By: Connee Rinne, MD
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
## 2018-09-27 NOTE — PLAN OF CARE
Problem: SELF HARM/SUICIDALITY  Goal: Will have no self-injury during hospital stay  INTERVENTIONS:  - Q 15 MINUTES: Routine safety checks  - Q WAKING SHIFT & PRN: Assess risk to determine if routine checks are adequate to maintain patient safety  - Encourage patient to participate actively in care by formulating a plan to combat response to suicidal ideation, identify supports and resources   Outcome: Progressing      Problem: Ineffective Coping  Goal: Participates in unit activities  Interventions:  - Provide therapeutic environment   - Provide required programming   - Redirect inappropriate behaviors    Outcome: Progressing      Problem: DISCHARGE PLANNING  Goal: Discharge to home or other facility with appropriate resources  INTERVENTIONS:  - Identify barriers to discharge w/patient and caregiver  - Arrange for needed discharge resources and transportation as appropriate  - Identify discharge learning needs (meds, wound care, etc )  - Arrange for interpretive services to assist at discharge as needed  - Refer to Case Management Department for coordinating discharge planning if the patient needs post-hospital services based on physician/advanced practitioner order or complex needs related to functional status, cognitive ability, or social support system   Outcome: Progressing

## 2018-09-28 PROCEDURE — 99232 SBSQ HOSP IP/OBS MODERATE 35: CPT | Performed by: PSYCHIATRY & NEUROLOGY

## 2018-09-28 RX ORDER — LAMOTRIGINE 100 MG/1
300 TABLET ORAL
Status: DISCONTINUED | OUTPATIENT
Start: 2018-09-28 | End: 2018-10-01 | Stop reason: HOSPADM

## 2018-09-28 RX ADMIN — ARIPIPRAZOLE 15 MG: 15 TABLET ORAL at 21:52

## 2018-09-28 RX ADMIN — LORAZEPAM 1 MG: 1 TABLET ORAL at 18:14

## 2018-09-28 RX ADMIN — LAMOTRIGINE 300 MG: 100 TABLET ORAL at 21:52

## 2018-09-28 RX ADMIN — VITAMIN D, TAB 1000IU (100/BT) 2000 UNITS: 25 TAB at 08:46

## 2018-09-28 RX ADMIN — ATOMOXETINE 40 MG: 40 CAPSULE ORAL at 08:54

## 2018-09-28 RX ADMIN — LORAZEPAM 1 MG: 1 TABLET ORAL at 08:46

## 2018-09-28 RX ADMIN — TRAZODONE HYDROCHLORIDE 50 MG: 50 TABLET ORAL at 21:52

## 2018-09-28 RX ADMIN — CARBAMAZEPINE 100 MG: 200 TABLET ORAL at 18:14

## 2018-09-28 RX ADMIN — CARBAMAZEPINE 100 MG: 200 TABLET ORAL at 08:45

## 2018-09-28 NOTE — PROGRESS NOTES
Pt is med and meal compliant  Pt reports 2/4 anxiety  Pt denies depression, SI/HI, and A/V hallucinations  Pt is visual in the milieu, interacts w/select peers  Pt attended in group and participated  Continue to monitor

## 2018-09-28 NOTE — ED PROVIDER NOTES
History  Chief Complaint   Patient presents with    Psychiatric Evaluation     pt comes from her psychiatrist office after intermittent thoughts of SI  signed 21      5year-old female with history of depression anxiety presenting to the emergency department for evaluation of suicidal ideas  The patient has been dealing with this over the past 3 months with her outpatient psychiatrist   She describes increasingly intrusive thoughts about hurting herself  She describes thoughts of her car off the road  She was seen by her psychiatrist today who felt that these thoughts had turned into active ideations  Her psychiatrist recommended that she come to the emergency department  She comes today to 1 side  She denies any homicidal ideations  She denies any auditory or visual hallucinations  She denies any physical complaints at this time  Prior to Admission Medications   Prescriptions Last Dose Informant Patient Reported? Taking?    ARIPiprazole (ABILIFY) 10 mg tablet 9/26/2018 at Unknown time Self No Yes   Sig: Take 1 tablet (10 mg total) by mouth daily at bedtime for 120 days   LORazepam (ATIVAN) 1 mg tablet 9/26/2018 at Unknown time Self No Yes   Sig: Take 1 tablet (1 mg total) by mouth 2 (two) times a day as needed for anxiety for up to 120 days   Vitamin D, Cholecalciferol, 1000 units CAPS   Yes Yes   Sig: Take 2,000 Units by mouth   atoMOXetine (STRATTERA) 40 mg capsule 9/26/2018 at Unknown time Self No Yes   Sig: Take 1 capsule (40 mg total) by mouth daily for 120 days   lamoTRIgine (LaMICtal) 100 mg tablet   No Yes   Sig: Take 0 5 tablets (50 mg total) by mouth daily at bedtime for 12 days then 1 tab QHS for 14 days then 1 5 tab QHS for 14 days then 2 tab QHS   Patient taking differently: Take 200 mg by mouth daily at bedtime        Facility-Administered Medications: None       Past Medical History:   Diagnosis Date    Acute pancreatitis     Last Assessed: 12Apr2016; due to gallstones (removed)  ADHD (attention deficit hyperactivity disorder)     Anemia     Anxiety     Arthralgia     Bipolar disorder (HCC)     Concussion     Last Assessed: 2012    Endometriosis     Esophageal reflux     Familial combined hyperlipidemia     Last Assessed: [id number]    Gastropathy     Head injury     History of sinusitis     Irregular heart beat     Migraine     Last Assessed: 2012    Paroxysmal supraventricular tachycardia (HCC)     Peptic ulcer disease     Spontaneous      Substance abuse     Suicide attempt ([facility] [address] )     UTI (urinary tract infection)     Vitamin D deficiency        Past Surgical History:   Procedure Laterality Date     SECTION      FOREARM SURGERY Left     INDUCED       x3    LAPAROSCOPIC CHOLECYSTECTOMY      Last Assessed: 2016    LAPAROSCOPY      Exploratory    TONSILLECTOMY      Onset: [id number]       Family History   Problem Relation Age of Onset    Hyperthyroidism Mother     Thyroid disease Mother     Depression Father     Hypertension Father     Obesity Father     Other Paternal Grandmother         Cardiac Disorder    Dementia Paternal Grandmother     Hypertension Paternal Grandmother     Hypertension Paternal Grandfather     Other Paternal Grandfather         Cardiac Disorder    Schizophrenia Other     Schizophrenia Other     Suicidality Other     Completed Suicide  Other     Breast cancer Other      I have reviewed and agree with the history as documented  Social History   Substance Use Topics    Smoking status: Former Smoker     Quit date: 3/1/2015    Smokeless tobacco: Never Used      Comment: Quit    Alcohol use Yes      Comment: Social alcohol        Review of Systems   Constitutional: Negative for appetite change, chills, fatigue and fever  HENT: Negative for sneezing and sore throat  Eyes: Negative for visual disturbance     Respiratory: Negative for cough, choking, chest tightness, shortness of breath and wheezing  Cardiovascular: Negative for chest pain and palpitations  Gastrointestinal: Negative for abdominal pain, constipation, diarrhea, nausea and vomiting  Genitourinary: Negative for difficulty urinating and dysuria  Neurological: Negative for dizziness, weakness, light-headedness, numbness and headaches  Psychiatric/Behavioral: Positive for suicidal ideas  All other systems reviewed and are negative  Physical Exam  ED Triage Vitals [09/26/18 1504]   Temperature Pulse Respirations Blood Pressure SpO2   97 7 °F (36 5 °C) 98 18 (!) 174/88 98 %      Temp Source Heart Rate Source Patient Position - Orthostatic VS BP Location FiO2 (%)   Oral Monitor Sitting Right arm --      Pain Score       No Pain           Orthostatic Vital Signs  Vitals:    09/27/18 0746 09/27/18 1100 09/27/18 1633 09/28/18 0739   BP: 136/78 167/98 135/81 139/77   Pulse: 85 101 81 81   Patient Position - Orthostatic VS: Sitting  Sitting Sitting       Physical Exam   Constitutional: She is oriented to person, place, and time  She appears well-developed and well-nourished  No distress  HENT:   Head: Normocephalic and atraumatic  Mouth/Throat: Oropharynx is clear and moist    Eyes: Pupils are equal, round, and reactive to light  EOM are normal    Neck: No JVD present  No tracheal deviation present  Cardiovascular: Normal rate, regular rhythm, normal heart sounds and intact distal pulses  Exam reveals no gallop and no friction rub  No murmur heard  Pulmonary/Chest: Effort normal and breath sounds normal  No respiratory distress  She has no wheezes  She has no rales  Abdominal: Soft  Bowel sounds are normal  She exhibits no distension  There is no tenderness  There is no rebound and no guarding  Neurological: She is alert and oriented to person, place, and time  No cranial nerve deficit  She exhibits normal muscle tone  Skin: Skin is warm and dry  She is not diaphoretic  No pallor     Psychiatric: She has a normal mood and affect  Her behavior is normal    Nursing note and vitals reviewed        ED Medications  Medications   hydrOXYzine HCL (ATARAX) tablet 25 mg (not administered)   traZODone (DESYREL) tablet 50 mg (not administered)   risperiDONE (RisperDAL M-TABS) dispersible tablet 1 mg (not administered)   magnesium hydroxide (MILK OF MAGNESIA) 400 mg/5 mL oral suspension 30 mL (not administered)   aluminum-magnesium hydroxide-simethicone (MYLANTA) 200-200-20 mg/5 mL oral suspension 30 mL (not administered)   acetaminophen (TYLENOL) tablet 650 mg (650 mg Oral Given 9/27/18 1055)   ibuprofen (MOTRIN) tablet 600 mg (not administered)   ibuprofen (MOTRIN) tablet 800 mg (not administered)   ARIPiprazole (ABILIFY) tablet 15 mg (15 mg Oral Given 9/27/18 2117)   atoMOXetine (STRATTERA) capsule 40 mg (40 mg Oral Given 9/28/18 0854)   LORazepam (ATIVAN) tablet 1 mg (1 mg Oral Given 9/28/18 0846)   cholecalciferol (VITAMIN D3) tablet 2,000 Units (2,000 Units Oral Given 9/28/18 0846)   benztropine (COGENTIN) injection 1 mg (not administered)   benztropine (COGENTIN) tablet 1 mg (not administered)   haloperidol (HALDOL) tablet 5 mg (not administered)   haloperidol lactate (HALDOL) injection 5 mg (not administered)   LORazepam (ATIVAN) 2 mg/mL injection 1 mg (not administered)   LORazepam (ATIVAN) tablet 1 mg (not administered)   OLANZapine (ZyPREXA) IM injection 10 mg (not administered)   carBAMazepine (TEGretol) tablet 100 mg (100 mg Oral Given 9/28/18 0845)   lamoTRIgine (LaMICtal) tablet 300 mg (not administered)   LORazepam (ATIVAN) tablet 1 mg (1 mg Oral Given 9/26/18 1610)       Diagnostic Studies  Results Reviewed     Procedure Component Value Units Date/Time    Rapid drug screen, urine [[id number]]  (Normal) Collected:  09/26/18 1530    Lab Status:  Final result Specimen:  Urine from Urine, Other Updated:  09/26/18 2287     Amph/Meth UR Negative     Barbiturate Ur Negative     Benzodiazepine Urine Negative     Cocaine Urine Negative     Methadone Urine Negative     Opiate Urine Negative     PCP Ur Negative     THC Urine Negative    Narrative:         FOR MEDICAL PURPOSES ONLY  IF CONFIRMATION NEEDED PLEASE CONTACT THE LAB WITHIN 5 DAYS  Drug Screen Cutoff Levels:  AMPHETAMINE/METHAMPHETAMINES  1000 ng/mL  BARBITURATES     200 ng/mL  BENZODIAZEPINES     200 ng/mL  COCAINE      300 ng/mL  METHADONE      300 ng/mL  OPIATES      300 ng/mL  PHENCYCLIDINE     25 ng/mL  THC       50 ng/mL    POCT pregnancy, urine [[id number]]  (Normal) Resulted:  09/26/18 1529    Lab Status:  Final result Updated:  09/26/18 1529     EXT PREG TEST UR (Ref: Negative) negative    POCT alcohol breath test [[id number]]  (Normal) Resulted:  09/26/18 1524    Lab Status:  Final result Updated:  09/26/18 1524     EXTBreath Alcohol 0 000                 No orders to display         Procedures  Procedures      Phone Consults  ED Phone Contact    ED Course                               MDM  Number of Diagnoses or Management Options  Suicidal ideations:   Diagnosis management comments: A 24-year-old female with suicidal ideations inside to L1  I have discussed the case with her psychiatrist who are impressed upon me that she felt the patient is unsafe  I would be agreeable to having the patient signed in  We will obtain medical clearance and crisis consult  CritCare Time    Disposition  Final diagnoses:   Suicidal ideations     Time reflects when diagnosis was documented in both MDM as applicable and the Disposition within this note     Time User Action Codes Description Comment    9/26/2018  4:04 PM Mary Christine Add [F32 9,  R44 661] Depression with suicidal ideation     9/26/2018  4:14 PM Adalid Flanagan Add [X50 211] Suicidal ideations       ED Disposition     ED Disposition Condition Comment    Transfer to [address] [place] [address] should be transferred out to [facility] under Dr Rene Clifton and has been medically cleared         MD Documentation Most Recent Value   Accepting Physician  Dr Yee Huizar Name, [address] [place], [place]   Sending MD  Dr Asim Ambriz      RN Documentation      [facility] [address] Name, [address] [place], [place]      Follow-up Information     Follow up With Specialties Details Why Contact Info    [address]  Follow up Start this program on October 2nd, Tuesday, at [facility] will be provided  Please bring your ID and insurance card  [facility]  [address]  [phone]          Current Discharge Medication List      CONTINUE these medications which have NOT CHANGED    Details   ARIPiprazole (ABILIFY) 10 mg tablet Take 1 tablet (10 mg total) by mouth daily at bedtime for 120 days  Qty: 30 tablet, Refills: 3    Associated Diagnoses: Bipolar I disorder, most recent episode mixed, severe with psychotic features (HCC)      atoMOXetine (STRATTERA) 40 mg capsule Take 1 capsule (40 mg total) by mouth daily for 120 days  Qty: 30 capsule, Refills: 3    Associated Diagnoses: ADHD, predominantly inattentive type      lamoTRIgine (LaMICtal) 100 mg tablet Take 0 5 tablets (50 mg total) by mouth daily at bedtime for 12 days then 1 tab QHS for 14 days then 1 5 tab QHS for 14 days then 2 tab QHS  Qty: 60 tablet, Refills: 3    Associated Diagnoses: Bipolar I disorder, most recent episode mixed, severe with psychotic features (HCC)      LORazepam (ATIVAN) 1 mg tablet Take 1 tablet (1 mg total) by mouth 2 (two) times a day as needed for anxiety for up to 120 days  Qty: 60 tablet, Refills: 3    Associated Diagnoses: SANA (generalized anxiety disorder)      Vitamin D, Cholecalciferol, 1000 units CAPS Take 2,000 Units by mouth           No discharge procedures on file  ED Provider  Attending physically available and evaluated Cheryle Bastos  LILIAM managed the patient along with the ED Attending      Electronically Signed by         Ja Osman MD  09/28/18 4890

## 2018-09-28 NOTE — PROGRESS NOTES
Pt withdrawn, depressed, reclusive to room  Pt is not social or visible in milieu, states "I don't like to get involved in other's problems, it triggers negative emotions "  Pt admits depression, anxiety, denies all other S/S  Pt is cooperative with medications

## 2018-09-28 NOTE — PROGRESS NOTES
Progress Note - [address] Anamika 38 y o  female MRN: [id number]   Unit/Bed#: PI3 319-69 Encounter: [id number]    Behavior over the last 24 hours: slowly improving  Francisca Dominick states she feels slightly less depressed today, denies suicidal thoughts and feels intrusive thoughts are also less prominent  She still reports anxiety symptoms and is still tearful at times  Compliant with medications  Attends group therapy  Sleep: slept off and on  Appetite: decreased  Medication side effects: No   ROS: no complaints, denies any chest pain, abdominal pain or rash    Mental Status Evaluation:    Appearance:  casually dressed   Behavior:  cooperative   Speech:  hypertalkative, soft   Mood:  anxious, slightly less depressed   Affect:  tearful, still somewhat labile   Thought Process:  organized, goal directed   Associations: intact associations   Thought Content:  no overt delusions   Perceptual Disturbances: no auditory hallucinations, no visual hallucinations   Risk Potential: Suicidal ideation - None at present  Homicidal ideation - None  Potential for aggression - No   Sensorium:  oriented to person, place and time/date   Memory:  recent and remote memory grossly intact   Consciousness:  alert and awake   Attention: decreased concentration and decreased attention span   Insight:  moderate   Judgment: moderate   Gait/Station: normal gait/station and normal balance   Motor Activity: no abnormal movements     Vital signs in last 24 hours:    Temp:  [98 1 °F (36 7 °C)-98 2 °F (36 8 °C)] 98 2 °F (36 8 °C)  HR:  [] 81  Resp:  [16-17] 16  BP: (135-167)/(77-98) 139/77    Laboratory results:  I have personally reviewed all pertinent laboratory/tests results      RPR:   Lab Results   Component Value Date    RPR Non-Reactive 09/27/2018       Progress Toward Goals: progressing slowly, still anxious, slightly less depressed, not suicidal today, working on coping skills    Assessment/Plan   Principal Problem:    Bipolar I disorder, most recent episode depressed, severe without psychotic features ([facility] [address] )  Active Problems:    SANA (generalized anxiety disorder)    ADHD, predominantly inattentive type    Recommended Treatment:     Planned medication and treatment changes: All current active medications have been reviewed  Encourage group therapy, milieu therapy and occupational therapy  Behavioral Health checks every 5 minutes  Observe progress over the weekend  Possible discharge after the weekend if continues to improve     Increase Lamictal to 300 mg at bedtime  Check Tegretol level, CBC and CMP in the morning - plan to adjust Tegretol dose if level subtherapeutic    Continue all other medications:    Current Facility-Administered Medications:  acetaminophen 650 mg Oral Q6H PRN HARRIET Coburn   aluminum-magnesium hydroxide-simethicone 30 mL Oral Q4H PRN HARRIET Coburn   ARIPiprazole 15 mg Oral HS Khushboo Akhtar MD   atoMOXetine 40 mg Oral Daily Khushboo Akhtar MD   benztropine 1 mg Intramuscular Q6H PRN Khushboo Akhtar MD   benztropine 1 mg Oral Q6H PRN Khushboo Akhtar MD   carBAMazepine 100 mg Oral BID Khushboo Akhtar MD   cholecalciferol 2,000 Units Oral Daily Khushboo Akhtar MD   haloperidol 5 mg Oral Q6H PRN Khushboo Akhtar MD   haloperidol lactate 5 mg Intramuscular Q6H PRN Khushboo Akhtar MD   hydrOXYzine HCL 25 mg Oral Q6H PRN Mary Christine, HARRIET   ibuprofen 600 mg Oral Q8H PRN Mary Christine, HARRIET   ibuprofen 800 mg Oral Q8H PRN HARRIET Coburn   lamoTRIgine 300 mg Oral HS Khushboo Akhtar MD   LORazepam 1 mg Intramuscular Q6H PRN Khushboo Akhtar MD   LORazepam 1 mg Oral BID Khushboo Akhtar MD   LORazepam 1 mg Oral Q6H PRN Khushboo Akhtar MD   magnesium hydroxide 30 mL Oral Daily PRN HARRIET Coburn   OLANZapine 10 mg Intramuscular Q3H PRDELPHINE Akhtar MD   risperiDONE 1 mg Oral Q3H PRN HARRIET Andres   traZODone 50 mg Oral HS PRN HARRIET Coburn       Risks / Benefits of Treatment:    Risks, benefits, and possible side effects of medications explained to patient and patient verbalizes understanding and agreement for treatment  Risks of medications in pregnancy explained if female patient  Patient verbalizes understanding and agrees to notify her doctor if she becomes pregnant  Counseling / Coordination of Care:    Patient's progress discussed with staff in treatment team meeting  Medications, treatment progress and treatment plan reviewed with patient  Medication changes discussed with patient      Amelia Cao MD 09/28/18

## 2018-09-28 NOTE — PROGRESS NOTES
PT denies SI/HI  Pt states she has anxiety and feels "sad" still  Pt c/o of not being able to sleep last night  Pt would like Sleep aide given at night  PT is visible ou tin milieu

## 2018-09-28 NOTE — CASE MANAGEMENT
Pt has some periods of anxiety and depression but does feel improved since admission  Pt is glad she will able to do her homework this weekend and looks forward to it  Admits to feeling homesick and states she doesn't like when other pt are "loud" and agitated, that it stimulates her anxiety feelings  Pt is looking forward to discharge Monday, 10/1/18, and then starting [employer] the next day on Tuesday, 10/2/18  Pt denies any ideations of self harm

## 2018-09-29 LAB
ALBUMIN SERPL BCP-MCNC: 3.2 G/DL (ref 3.5–5)
ALP SERPL-CCNC: 97 U/L (ref 46–116)
ALT SERPL W P-5'-P-CCNC: 32 U/L (ref 12–78)
ANION GAP SERPL CALCULATED.3IONS-SCNC: 6 MMOL/L (ref 4–13)
AST SERPL W P-5'-P-CCNC: 13 U/L (ref 5–45)
BASOPHILS # BLD AUTO: 0.03 THOUSANDS/ΜL (ref 0–0.1)
BASOPHILS NFR BLD AUTO: 0 % (ref 0–1)
BILIRUB SERPL-MCNC: 0.24 MG/DL (ref 0.2–1)
BUN SERPL-MCNC: 11 MG/DL (ref 5–25)
CALCIUM SERPL-MCNC: 8.6 MG/DL (ref 8.3–10.1)
CARBAMAZEPINE SERPL-MCNC: 3 UG/ML (ref 4–12)
CHLORIDE SERPL-SCNC: 105 MMOL/L (ref 100–108)
CO2 SERPL-SCNC: 27 MMOL/L (ref 21–32)
CREAT SERPL-MCNC: 0.9 MG/DL (ref 0.6–1.3)
EOSINOPHIL # BLD AUTO: 0.15 THOUSAND/ΜL (ref 0–0.61)
EOSINOPHIL NFR BLD AUTO: 2 % (ref 0–6)
ERYTHROCYTE [DISTWIDTH] IN BLOOD BY AUTOMATED COUNT: 13 % (ref 11.6–15.1)
GFR SERPL CREATININE-BSD FRML MDRD: 80 ML/MIN/1.73SQ M
GLUCOSE P FAST SERPL-MCNC: 97 MG/DL (ref 65–99)
GLUCOSE SERPL-MCNC: 97 MG/DL (ref 65–140)
HCT VFR BLD AUTO: 35.5 % (ref 34.8–46.1)
HGB BLD-MCNC: 11.7 G/DL (ref 11.5–15.4)
IMM GRANULOCYTES # BLD AUTO: 0.02 THOUSAND/UL (ref 0–0.2)
IMM GRANULOCYTES NFR BLD AUTO: 0 % (ref 0–2)
LYMPHOCYTES # BLD AUTO: 1.92 THOUSANDS/ΜL (ref 0.6–4.47)
LYMPHOCYTES NFR BLD AUTO: 28 % (ref 14–44)
MCH RBC QN AUTO: 28.4 PG (ref 26.8–34.3)
MCHC RBC AUTO-ENTMCNC: 33 G/DL (ref 31.4–37.4)
MCV RBC AUTO: 86 FL (ref 82–98)
MONOCYTES # BLD AUTO: 0.51 THOUSAND/ΜL (ref 0.17–1.22)
MONOCYTES NFR BLD AUTO: 8 % (ref 4–12)
NEUTROPHILS # BLD AUTO: 4.14 THOUSANDS/ΜL (ref 1.85–7.62)
NEUTS SEG NFR BLD AUTO: 62 % (ref 43–75)
NRBC BLD AUTO-RTO: 0 /100 WBCS
PLATELET # BLD AUTO: 307 THOUSANDS/UL (ref 149–390)
PMV BLD AUTO: 10.2 FL (ref 8.9–12.7)
POTASSIUM SERPL-SCNC: 4.1 MMOL/L (ref 3.5–5.3)
PROT SERPL-MCNC: 7.1 G/DL (ref 6.4–8.2)
RBC # BLD AUTO: 4.12 MILLION/UL (ref 3.81–5.12)
SODIUM SERPL-SCNC: 138 MMOL/L (ref 136–145)
WBC # BLD AUTO: 6.77 THOUSAND/UL (ref 4.31–10.16)

## 2018-09-29 PROCEDURE — 80053 COMPREHEN METABOLIC PANEL: CPT | Performed by: PSYCHIATRY & NEUROLOGY

## 2018-09-29 PROCEDURE — 80156 ASSAY CARBAMAZEPINE TOTAL: CPT | Performed by: PSYCHIATRY & NEUROLOGY

## 2018-09-29 PROCEDURE — 85025 COMPLETE CBC W/AUTO DIFF WBC: CPT | Performed by: PSYCHIATRY & NEUROLOGY

## 2018-09-29 RX ORDER — CARBAMAZEPINE 200 MG/1
200 TABLET ORAL 2 TIMES DAILY
Status: DISCONTINUED | OUTPATIENT
Start: 2018-09-29 | End: 2018-10-01 | Stop reason: HOSPADM

## 2018-09-29 RX ADMIN — LORAZEPAM 1 MG: 1 TABLET ORAL at 17:08

## 2018-09-29 RX ADMIN — LORAZEPAM 1 MG: 1 TABLET ORAL at 08:43

## 2018-09-29 RX ADMIN — TRAZODONE HYDROCHLORIDE 50 MG: 50 TABLET ORAL at 21:19

## 2018-09-29 RX ADMIN — ATOMOXETINE 40 MG: 40 CAPSULE ORAL at 08:42

## 2018-09-29 RX ADMIN — ARIPIPRAZOLE 15 MG: 15 TABLET ORAL at 21:10

## 2018-09-29 RX ADMIN — LAMOTRIGINE 300 MG: 100 TABLET ORAL at 21:11

## 2018-09-29 RX ADMIN — CARBAMAZEPINE 100 MG: 200 TABLET ORAL at 08:43

## 2018-09-29 RX ADMIN — VITAMIN D, TAB 1000IU (100/BT) 2000 UNITS: 25 TAB at 08:42

## 2018-09-29 RX ADMIN — CARBAMAZEPINE 200 MG: 200 TABLET ORAL at 17:08

## 2018-09-29 NOTE — PROGRESS NOTES
Progress Note - Behavioral Health   Linda Nuñez 38 y o  female MRN: [id number]  Unit/Bed#: HX1 768-02 Encounter: [id number]    Assessment/Plan   Principal Problem:    Bipolar I disorder, most recent episode depressed, severe without psychotic features ([facility] [address] )  Active Problems:    ADHD, predominantly inattentive type    SANA (generalized anxiety disorder)      Behavior over the last 24 hours:  improved  Sleep: normal  Appetite: normal  Medication side effects: No  ROS: no complaints    Mental Status Evaluation:  Appearance:  age appropriate and casually dressed   Behavior:  normal   Speech:  normal pitch and normal volume   Mood:  constricted   Affect:  constricted   Thought Process:  goal directed and logical   Thought Content:  normal   Perceptual Disturbances: None   Risk Potential: Suicidal Ideations none, Homicidal Ideations none and Potential for Aggression No   Sensorium:  person, place and time/date   Cognition:  grossly intact   Consciousness:  alert and awake    Attention: attention span and concentration were age appropriate   Insight:  limited   Judgment: limited   Gait/Station: normal gait/station and normal balance   Motor Activity: no abnormal movements     Progress Toward Goals: Patient remains compliant with treatment and denies medication side effects  Tegretol level is 3 0 and dose was increased to 200 mg bid    Recommended Treatment: Continue with group therapy, milieu therapy and occupational therapy  Risks, benefits and possible side effects of Medications:   Risks, benefits, and possible side effects of medications explained to patient and patient verbalizes understanding  Medications: all current active meds have been reviewed and continue current psychiatric medications  Labs: reviewed    Counseling / Coordination of Care  Total floor / unit time spent today n/a minutes   Greater than 50% of total time was spent with the patient and / or family counseling and / or coordination of care  A description of the counseling / coordination of care:
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
## 2018-09-30 PROCEDURE — 99232 SBSQ HOSP IP/OBS MODERATE 35: CPT | Performed by: PSYCHIATRY & NEUROLOGY

## 2018-09-30 RX ORDER — AMOXICILLIN 250 MG
2 CAPSULE ORAL
Status: DISCONTINUED | OUTPATIENT
Start: 2018-09-30 | End: 2018-10-01 | Stop reason: HOSPADM

## 2018-09-30 RX ORDER — DOCUSATE SODIUM 100 MG/1
100 CAPSULE, LIQUID FILLED ORAL 2 TIMES DAILY
Status: DISCONTINUED | OUTPATIENT
Start: 2018-09-30 | End: 2018-10-01 | Stop reason: HOSPADM

## 2018-09-30 RX ADMIN — ARIPIPRAZOLE 15 MG: 15 TABLET ORAL at 21:52

## 2018-09-30 RX ADMIN — LORAZEPAM 1 MG: 1 TABLET ORAL at 17:16

## 2018-09-30 RX ADMIN — MAGNESIUM HYDROXIDE 30 ML: 400 SUSPENSION ORAL at 08:33

## 2018-09-30 RX ADMIN — CARBAMAZEPINE 200 MG: 200 TABLET ORAL at 17:16

## 2018-09-30 RX ADMIN — BENZTROPINE MESYLATE 1 MG: 1 TABLET ORAL at 12:03

## 2018-09-30 RX ADMIN — CARBAMAZEPINE 200 MG: 200 TABLET ORAL at 08:18

## 2018-09-30 RX ADMIN — VITAMIN D, TAB 1000IU (100/BT) 2000 UNITS: 25 TAB at 08:18

## 2018-09-30 RX ADMIN — LORAZEPAM 1 MG: 1 TABLET ORAL at 08:18

## 2018-09-30 RX ADMIN — LAMOTRIGINE 300 MG: 100 TABLET ORAL at 21:51

## 2018-09-30 RX ADMIN — TRAZODONE HYDROCHLORIDE 50 MG: 50 TABLET ORAL at 22:02

## 2018-09-30 RX ADMIN — ATOMOXETINE 40 MG: 40 CAPSULE ORAL at 08:18

## 2018-09-30 RX ADMIN — DOCUSATE SODIUM 100 MG: 100 CAPSULE, LIQUID FILLED ORAL at 17:16

## 2018-09-30 RX ADMIN — Medication 2 TABLET: at 21:52

## 2018-09-30 NOTE — PROGRESS NOTES
Pt reported feeling like her tongue and lips were numb  She had prn Cogentin at this time  She denies any swelling, difficulty breathing or other problems  Will monitor

## 2018-09-30 NOTE — PROGRESS NOTES
Pt denies SI, feels anxious at times, no longer feeling overwhelmed by schoolwork  Pt "feels fine" denies all other symptoms  Pt appears sad at times, brightens on approach    Pt is visible in the milieu and selectively social, watching football by herself, states "I did homework earlier so now I can watch the game "

## 2018-09-30 NOTE — PROGRESS NOTES
Progress Note - Behavioral Health   Franca Arechiga 38 y o  female MRN: [id number]  Unit/Bed#: BL1 768-02 Encounter: [id number]    Assessment/Plan   Principal Problem:    Bipolar I disorder, most recent episode depressed, severe without psychotic features ([facility] [address] )  Active Problems:    ADHD, predominantly inattentive type    SANA (generalized anxiety disorder)      Behavior over the last 24 hours:  unchanged  Sleep: normal  Appetite: normal  Medication side effects: Yes sedation  ROS: no complaints    Mental Status Evaluation:  Appearance:  age appropriate and casually dressed   Behavior:  cooperative   Speech:  normal pitch and normal volume   Mood:  constricted   Affect:  constricted   Thought Process:  goal directed and logical   Thought Content:  normal   Perceptual Disturbances: None   Risk Potential: Suicidal Ideations none, Homicidal Ideations none and Potential for Aggression No   Sensorium:  person, place and time/date   Cognition:  grossly intact   Consciousness:  alert and awake    Attention: attention span appeared shorter than expected for age   Insight:  limited   Judgment: limited   Gait/Station: normal gait/station and normal balance   Motor Activity: no abnormal movements     Progress Toward Goals: Patient stated she is experiencing some sedation after dose increase on tegretol  She denies other side effects  She stated she is hoping for discharge tomorrow since she signed a request to withdraw from treatment  She stated she plans to continue psychiatric care on outpatient basis  Recommended Treatment: Continue with group therapy, milieu therapy and occupational therapy  Risks, benefits and possible side effects of Medications:   Risks, benefits, and possible side effects of medications explained to patient and patient verbalizes understanding  Medications: all current active meds have been reviewed and continue current psychiatric medications      Labs: reviewed    Counseling / Coordination of Care  Total floor / unit time spent today n/a minutes  Greater than 50% of total time was spent with the patient and / or family counseling and / or coordination of care   A description of the counseling / coordination of care:

## 2018-09-30 NOTE — PROGRESS NOTES
Pt tearful, sad, withdrawn, anxious  Pt looking for a quiet space to do her homework, complains "every time I start doing homework I get anxious and can't continue "  Pt passive SI, does not wish to die but has "intrusive thoughts" that dying would be easier than living, denies HI  Pt counseled to focus on one thing at a time, expressed feeling better after talking  Pt was seen doing homework after dinner  Pt is cooperative with medications and selectively social, appropriate in the milieu

## 2018-09-30 NOTE — PROGRESS NOTES
Pt denies SI  She did state she had some mild anxiety earlier and at times has "intrusive thoughts" but states she is calmer now and looking forward to her d/c for tomorrow

## 2018-09-30 NOTE — PLAN OF CARE
Problem: SELF HARM/SUICIDALITY  Goal: Will have no self-injury during hospital stay  INTERVENTIONS:  - Q 15 MINUTES: Routine safety checks  - Q WAKING SHIFT & PRN: Assess risk to determine if routine checks are adequate to maintain patient safety  - Encourage patient to participate actively in care by formulating a plan to combat response to suicidal ideation, identify supports and resources   Outcome: Adequate for Discharge      Problem: Ineffective Coping  Goal: Participates in unit activities  Interventions:  - Provide therapeutic environment   - Provide required programming   - Redirect inappropriate behaviors    Outcome: Adequate for Discharge      Problem: DISCHARGE PLANNING  Goal: Discharge to home or other facility with appropriate resources  INTERVENTIONS:  - Identify barriers to discharge w/patient and caregiver  - Arrange for needed discharge resources and transportation as appropriate  - Identify discharge learning needs (meds, wound care, etc )  - Arrange for interpretive services to assist at discharge as needed  - Refer to Case Management Department for coordinating discharge planning if the patient needs post-hospital services based on physician/advanced practitioner order or complex needs related to functional status, cognitive ability, or social support system   Outcome: Adequate for Discharge

## 2018-09-30 NOTE — PROGRESS NOTES
Per patient, the Cogentin was not effective and her mouth is still numb  Dr to be made aware  Also, the Milk of Mag was ineffective  Will make the doctor aware

## 2018-10-01 VITALS
SYSTOLIC BLOOD PRESSURE: 138 MMHG | HEART RATE: 72 BPM | TEMPERATURE: 97.9 F | OXYGEN SATURATION: 98 % | RESPIRATION RATE: 16 BRPM | HEIGHT: 62 IN | WEIGHT: 246 LBS | DIASTOLIC BLOOD PRESSURE: 86 MMHG | BODY MASS INDEX: 45.27 KG/M2

## 2018-10-01 LAB — CARBAMAZEPINE SERPL-MCNC: 6.7 UG/ML (ref 4–12)

## 2018-10-01 PROCEDURE — 80156 ASSAY CARBAMAZEPINE TOTAL: CPT | Performed by: PSYCHIATRY & NEUROLOGY

## 2018-10-01 PROCEDURE — 99239 HOSP IP/OBS DSCHRG MGMT >30: CPT | Performed by: PSYCHIATRY & NEUROLOGY

## 2018-10-01 RX ORDER — BENZTROPINE MESYLATE 0.5 MG/1
0.5 TABLET ORAL 2 TIMES DAILY
Status: DISCONTINUED | OUTPATIENT
Start: 2018-10-01 | End: 2018-10-01 | Stop reason: HOSPADM

## 2018-10-01 RX ORDER — BENZTROPINE MESYLATE 0.5 MG/1
0.5 TABLET ORAL 2 TIMES DAILY PRN
Qty: 60 TABLET | Refills: 0 | Status: SHIPPED | OUTPATIENT
Start: 2018-10-01 | End: 2018-10-05 | Stop reason: SDUPTHER

## 2018-10-01 RX ORDER — TRAZODONE HYDROCHLORIDE 50 MG/1
50 TABLET ORAL
Qty: 30 TABLET | Refills: 0 | Status: SHIPPED | OUTPATIENT
Start: 2018-10-01 | End: 2018-10-05 | Stop reason: SDUPTHER

## 2018-10-01 RX ORDER — LAMOTRIGINE 150 MG/1
300 TABLET ORAL
Qty: 60 TABLET | Refills: 0 | Status: SHIPPED | OUTPATIENT
Start: 2018-10-01 | End: 2018-10-05 | Stop reason: SDUPTHER

## 2018-10-01 RX ORDER — ATOMOXETINE 40 MG/1
40 CAPSULE ORAL DAILY
Qty: 30 CAPSULE | Refills: 0 | Status: SHIPPED | OUTPATIENT
Start: 2018-10-02 | End: 2018-10-05 | Stop reason: SDUPTHER

## 2018-10-01 RX ORDER — CARBAMAZEPINE 200 MG/1
200 TABLET ORAL 2 TIMES DAILY
Qty: 60 TABLET | Refills: 0 | Status: SHIPPED | OUTPATIENT
Start: 2018-10-01 | End: 2018-10-05 | Stop reason: SDUPTHER

## 2018-10-01 RX ORDER — ARIPIPRAZOLE 15 MG/1
15 TABLET ORAL
Qty: 30 TABLET | Refills: 0 | Status: SHIPPED | OUTPATIENT
Start: 2018-10-01 | End: 2018-10-05 | Stop reason: SDUPTHER

## 2018-10-01 RX ORDER — LORAZEPAM 1 MG/1
1 TABLET ORAL 2 TIMES DAILY PRN
Qty: 60 TABLET | Refills: 0 | Status: SHIPPED | OUTPATIENT
Start: 2018-10-09 | End: 2018-10-05 | Stop reason: ALTCHOICE

## 2018-10-01 RX ADMIN — LORAZEPAM 1 MG: 1 TABLET ORAL at 09:05

## 2018-10-01 RX ADMIN — VITAMIN D, TAB 1000IU (100/BT) 2000 UNITS: 25 TAB at 09:05

## 2018-10-01 RX ADMIN — BENZTROPINE MESYLATE 0.5 MG: 0.5 TABLET ORAL at 11:47

## 2018-10-01 RX ADMIN — DOCUSATE SODIUM 100 MG: 100 CAPSULE, LIQUID FILLED ORAL at 09:05

## 2018-10-01 RX ADMIN — CARBAMAZEPINE 200 MG: 200 TABLET ORAL at 09:05

## 2018-10-01 RX ADMIN — ATOMOXETINE 40 MG: 40 CAPSULE ORAL at 09:05

## 2018-10-01 RX ADMIN — ALUMINUM HYDROXIDE, MAGNESIUM HYDROXIDE, AND SIMETHICONE 30 ML: 200; 200; 20 SUSPENSION ORAL at 10:26

## 2018-10-01 NOTE — PROGRESS NOTES
Pt was cooperative with medications and pleasant in conversation this morning  Reported feeling anxiety about the plan for her discharge today  Denied SI, HI, or other symptoms and said she does want to go home  C/o stomach upset after breakfast and was given Mylanta

## 2018-10-01 NOTE — DISCHARGE INSTRUCTIONS
Atomoxetine (By mouth)   Atomoxetine (a-[id number]-MOX-[id number]-teen)  Treats attention deficit hyperactivity disorder (ADHD)  Brand Name(s): Strattera   There may be other brand names for this medicine  When This Medicine Should Not Be Used: This medicine is not right for everyone  Do not use it if you had an allergic reaction to atomoxetine, or if you have narrow-angle glaucoma, severe heart disease, or pheochromocytoma  How to Use This Medicine:   Capsule  · Take your medicine as directed  Your dose may need to be changed several times to find what works best for you  · This medicine should come with a Medication Guide  Ask your pharmacist for a copy if you do not have one  · Swallow the capsule whole  Do not crush, break, chew, or open it  · Do not touch a broken or opened capsule  Wash your hands with water if you touch an opened capsule  If this medicine gets in your eyes, rinse them with water and call your doctor right away  · Missed dose: Take a dose as soon as you remember  If it is almost time for your next dose, wait until then and take a regular dose  Do not take extra medicine to make up for a missed dose  · Store the medicine in a closed container at room temperature, away from heat, moisture, and direct light  Drugs and Foods to Avoid:   Ask your doctor or pharmacist before using any other medicine, including over-the-counter medicines, vitamins, and herbal products  · Do not use this medicine at the same time or within 14 days of taking an MAOI, such as isocarboxazid, phenelzine, selegiline, or tranylcypromine  · Some medicines can affect how atomoxetine works   Tell your doctor if you are taking any of the following:   ¨ Asthma medicine, such as albuterol  ¨ Depression medicine, such as fluoxetine, paroxetine  ¨ Dobutamine  ¨ Dopamine  ¨ Heart rhythm medicine, such as quinidine  Warnings While Using This Medicine:   · Tell your doctor if you are pregnant or breastfeeding, or if you have liver, kidney, heart, or blood vessel disease, heart rhythm problems, high or low blood pressure, or problems with urination  · Tell your doctor if you have a history of emotional problems, such as depression  For some children, teenagers, and young adults, this medicine may increase mental or emotional problems  This may lead to thoughts of suicide and violence  Talk with your doctor right away if you have any thoughts or behavior changes that concern you  Tell your doctor if you or anyone in your family has a history of bipolar disorder or suicide attempts  · This medicine may cause the following:   ¨ Liver problems  ¨ Heart or blood vessel problems  ¨ Painful or prolonged erection of the penis  ¨ Slowed growth in children  · This medicine may make you dizzy or drowsy  Do not drive or doing anything that could be dangerous until you know how this medicine affects you  · Your doctor will do lab tests at regular visits to check on the effects of this medicine  Keep all appointments  · Keep all medicine out of the reach of children  Never share your medicine with anyone    Possible Side Effects While Using This Medicine:   Call your doctor right away if you notice any of these side effects:  · Allergic reaction: Itching or hives, swelling in your face or hands, swelling or tingling in your mouth or throat, chest tightness, trouble breathing  · Chest pain, trouble breathing  · Dark urine or pale stools, nausea, vomiting, loss of appetite, stomach pain, yellow skin or eyes  · Decrease in how much or how often you urinate  · Erection of the penis that is painful or lasts longer than 4 hours  · Fast, pounding, uneven heartbeat  · Headache, lightheadedness, dizziness, fainting  · Mood changes, aggressiveness, irritability, depression  · Seeing, hearing, or feeling things that are not there, believing things that are not true  · Seizures or tremors  · Thoughts or plans of suicide  · Weight changes, slowed growth (children)  If you notice these less serious side effects, talk with your doctor:   · Dry mouth, constipation, heartburn, stomach pain or upset  · Loss of interest in sex, trouble having sex  · Unusual drowsiness, tiredness, or insomnia  If you notice other side effects that you think are caused by this medicine, tell your doctor  Call your doctor for medical advice about side effects  You may report side effects to FDA at 7-360-FDA-3581  © 2017 [address] [employer] Information is for End User's use only and may not be sold, redistributed or otherwise used for commercial purposes  The above information is an  only  It is not intended as medical advice for individual conditions or treatments  Talk to your doctor, nurse or pharmacist before following any medical regimen to see if it is safe and effective for you  Aripiprazole (By mouth)   Aripiprazole (ar-i-PIP-ra-zole)  Treats schizophrenia, bipolar disorder, depression, and Tourette syndrome  Also treats irritability associated with autism  Brand Name(s): Abilify   There may be other brand names for this medicine  When This Medicine Should Not Be Used: This medicine is not right for everyone  Do not use it if you had an allergic reaction to aripiprazole  How to Use This Medicine:   Liquid, Tablet, Dissolving Tablet  · Take your medicine as directed  Your dose may need to be changed several times to find what works best for you  · Tablet: Swallow whole  Do not break, crush, or chew it  · Disintegrating tablet: Make sure your hands are dry before you handle the disintegrating tablet  Peel back the foil from the blister pack, then remove the tablet  Do not push the tablet through the foil  Place the tablet in your mouth  After it has melted, swallow or take a drink of water  · This medicine should come with a Medication Guide  Ask your pharmacist for a copy if you do not have one  · Missed dose: Take a dose as soon as you remember   If it is almost time for your next dose, wait until then and take a regular dose  Do not take extra medicine to make up for a missed dose  · Store the medicine in a closed container at room temperature, away from heat, moisture, and direct light  Drugs and Foods to Avoid:   Ask your doctor or pharmacist before using any other medicine, including over-the-counter medicines, vitamins, and herbal products  · Some medicines can affect how aripiprazole works  Tell your doctor if you are using any of the following:   ¨ Carbamazepine, clarithromycin, fluoxetine, itraconazole, ketoconazole, paroxetine, quinidine, or rifampin  ¨ Benzodiazepine or sedative medicine (including lorazepam)  ¨ Blood pressure medicine  Warnings While Using This Medicine:   · Tell your doctor if you are pregnant or breastfeeding, or if you have diabetes, heart failure, heart or blood vessel disease, heart rhythm problems, high or low blood pressure, high cholesterol, or a history of seizures, heart attack or stroke  · For some children, teenagers, and young adults, this medicine may increase mental or emotional problems  This may lead to thoughts of suicide and violence  Talk with your doctor right away if you have any thoughts or behavior changes that concern you  Tell your doctor if you or anyone in your family has a history of bipolar disorder or suicide attempts  · This medicine may cause the following problems:   ¨ Neuroleptic malignant syndrome (NMS), a neurologic disorder than can be life-threatening  ¨ Tardive dyskinesia (muscle movements you cannot control)  ¨ Changes in blood sugar levels  ¨ Unusual changes in behavior, such as gambling urges, binge or compulsive eating, or compulsive shopping, or sexual urges  · This medicine may make you dizzy or drowsy, or may cause trouble with thinking or controlling body movements, which may lead to falls, fractures or other injuries   Do not drive or do anything that could be dangerous until you know how this medicine affects you  Stand or sit up slowly if you feel lightheaded or dizzy  · You may get overheated more easily while you are using this medicine  Be careful when you exercise or you are outside in hot or humid weather  Drink plenty of water to stay hydrated  · Tell your doctor if you have phenylketonuria (PKU)  The disintegrating tablet contains phenylalanine  · Do not stop using this medicine suddenly  Your doctor will need to slowly decrease your dose before you stop it completely  · Your doctor will do lab tests at regular visits to check on the effects of this medicine  Keep all appointments  · Keep all medicine out of the reach of children  Never share your medicine with anyone  Possible Side Effects While Using This Medicine:   Call your doctor right away if you notice any of these side effects:  · Allergic reaction: Itching or hives, swelling in your face or hands, swelling or tingling in your mouth or throat, chest tightness, trouble breathing  · Anxiety, irritability, nervousness, restlessness, or trouble sleeping  · Compulsive behavior or intense urges you cannot control  · Confusion, unusual behavior, depressed mood, or thoughts of hurting yourself or others  · Fever, chills, cough, sore throat, body aches  · Increased hunger or thirst, change in how much or how often you urinate  · Jerky muscle movements you cannot control (often in your face, tongue, or jaw)  · Lightheadedness, dizziness, fainting  · Seizures  · Sweating, uneven heartbeat, or muscle stiffness  · Unusual tiredness or sleepiness  If you notice these less serious side effects, talk with your doctor:   · Headache  · Nausea, vomiting, drooling  · Unusual weight gain  If you notice other side effects that you think are caused by this medicine, tell your doctor  Call your doctor for medical advice about side effects   You may report side effects to FDA at 7-506-FDA-1097  © 2017 [address] [employer] Information is for End User's use only and may not be sold, redistributed or otherwise used for commercial purposes  The above information is an  only  It is not intended as medical advice for individual conditions or treatments  Talk to your doctor, nurse or pharmacist before following any medical regimen to see if it is safe and effective for you  Trazodone (By mouth)   Trazodone (TRAZ-oh-done)  Treats depression  Brand Name(s): Oleptro   There may be other brand names for this medicine  When This Medicine Should Not Be Used: This medicine is not right for everyone  Do not use it if you had an allergic reaction to trazodone  How to Use This Medicine:   Tablet, Long Acting Tablet  · Take your medicine as directed  Your dose may need to be changed several times to find what works best for you  · Regular tablet: Take it with or shortly after a meal or light snack  · Extended-release tablet: Take it at the same time each day, preferably at bedtime, without food  · The tablet can be swallowed whole, or you may break the tablet in half along the score line  Do not break the tablet unless your doctor tells you to  Do not crush or chew the tablet  · This medicine should come with a Medication Guide  Ask your pharmacist for a copy if you do not have one  · Missed dose: Take a dose as soon as you remember  If it is almost time for your next dose, wait until then and take a regular dose  Do not take extra medicine to make up for a missed dose  · Store the medicine in a closed container at room temperature, away from heat, moisture, and direct light  Drugs and Foods to Avoid:   Ask your doctor or pharmacist before using any other medicine, including over-the-counter medicines, vitamins, and herbal products  · Do not use trazodone if you currently take an MAO inhibitor (MAOI) or have used an MAOI in the past 14 days    · Tell your doctor if you also use any of the following:  ¨ Carbamazepine, digoxin, phenytoin, indinavir, ritonavir, buspirone, fentanyl, lithium, tryptophan, Cele's wort, tramadol  ¨ Medicine to treat a fungal infection (such as itraconazole, ketoconazole), a diuretic (water pill), blood pressure medicine, an NSAID pain or arthritis medicine (such as aspirin, celecoxib, diclofenac, ibuprofen, naproxen), a blood thinner (such as warfarin), other medicine for depression, or triptan medicine to treat migraine headaches  · Do not drink alcohol while you are using this medicine  · Tell your doctor if you use anything else that makes you sleepy  Some examples are allergy medicine, narcotic pain medicine, and alcohol  Warnings While Using This Medicine:   · Tell your doctor if you are pregnant or breastfeeding, or if you have kidney disease, liver disease, bleeding problems, glaucoma, heart disease, heart rhythm problems, or low blood pressure  Tell your doctor if you recently had a heart attack  · For some children, teenagers, and young adults, this medicine may increase mental or emotional problems  This may lead to thoughts of suicide and violence  Talk with your doctor right away if you have any thoughts or behavior changes that concern you  Tell your doctor if you or anyone in your family has a history of bipolar disorder or suicide attempts  · This medicine may cause the following problems:  ¨ Serotonin syndrome (more likely when used with certain other medicines)  ¨ Heart rhythm problems (QT prolongation)  ¨ Low sodium levels  ¨ Higher risk of bleeding  · Do not stop using this medicine suddenly  Your doctor will need to slowly decrease your dose before you stop it completely  · This medicine may make you dizzy or drowsy  Do not drive or do anything that could be dangerous until you know how this medicine affects you  Stand or sit up slowly if you are dizzy  · Tell any doctor or dentist who treats you that you are using this medicine   You may need to stop using this medicine several days before you have surgery or medical tests  · Your doctor will check your progress and the effects of this medicine at regular visits  Keep all appointments  · Keep all medicine out of the reach of children  Never share your medicine with anyone  Possible Side Effects While Using This Medicine:   Call your doctor right away if you notice any of these side effects:  · Allergic reaction: Itching or hives, swelling in your face or hands, swelling or tingling in your mouth or throat, chest tightness, trouble breathing  · Anxiety, restlessness, fever, sweating, muscle spasms, nausea, vomiting, diarrhea, seeing or hearing things that are not there  · Confusion, weakness, muscle twitching  · Fast, pounding, or uneven heartbeat  · Lightheadedness, dizziness, fainting  · Painful, prolonged erection of your penis  · Sudden increase in energy, feeling irritable, trouble sleeping  · Thoughts of hurting yourself or others, unusual behavior  · Unusual bleeding or bruising  If you notice these less serious side effects, talk with your doctor:   · Constipation, mild nausea  · Dry mouth  · Eye pain, vision changes, seeing halos around lights  · Headache  · Sleepiness or unusual drowsiness  If you notice other side effects that you think are caused by this medicine, tell your doctor  Call your doctor for medical advice about side effects  You may report side effects to FDA at 6-394-FDA-1306  © 2017 [address] [employer]  Information is for End User's use only and may not be sold, redistributed or otherwise used for commercial purposes  The above information is an  only  It is not intended as medical advice for individual conditions or treatments  Talk to your doctor, nurse or pharmacist before following any medical regimen to see if it is safe and effective for you  Carbamazepine (By mouth)   Carbamazepine (lbt-mq-TTZ-e-peen)  Treats seizures, nerve pain, or bipolar disorder     Brand Name(s): Carbatrol, Epitol, Equetro, TEGretol, TEGretol-XR   There may be other brand names for this medicine  When This Medicine Should Not Be Used: This medicine is not right for everyone  Do not use it if you had an allergic reaction to carbamazepine or a tricyclic antidepressant or if you are pregnant  How to Use This Medicine:   Long Acting Capsule, Liquid, Tablet, Chewable Tablet, Long Acting Tablet  · Take your medicine as directed  Your dose may need to be changed several times to find what works best for you  This medicine may be used alone or together with other seizure medicines  · Extended-release capsule:   ¨ Swallow the capsule whole  Do not break, crush, or chew it  ¨ You may open the capsule and pour the medicine into a small amount of soft food such as pudding, yogurt, or applesauce  Stir this mixture well and swallow it without chewing  · Chewable tablet:   ¨ It is best to take this medicine with food or milk  ¨ Chew the tablet well before swallowing it  · Liquid:   ¨ It is best to take this medicine with food or milk  ¨ Measure the oral liquid medicine with a marked measuring spoon, oral syringe, or medicine cup  Shake the bottle well just before each use  ¨ Do not take this medicine at the same time as other oral liquid medicines  · Tablet:   ¨ It is best to take this medicine with food or milk  ¨ Swallow the tablet whole  Do not crush, break, or chew it  ¨ Do not use an extended-release tablet that is cracked or chipped  · This medicine should come with a Medication Guide  Ask your pharmacist for a copy if you do not have one  · Missed dose: Take a dose as soon as you remember  If it is almost time for your next dose, wait until then and take a regular dose  Do not take extra medicine to make up for a missed dose  · Store the medicine in a closed container at room temperature, away from heat, moisture, and direct light    Drugs and Foods to Avoid:   Ask your doctor or pharmacist before using any other medicine, including over-the-counter medicines, vitamins, and herbal products  · Do not use this medicine together with nefazodone, delavirdine, or certain other medicines for HIV or AIDS (including efavirenz, etravirine)  Do not use this medicine and an MAO inhibitor (MAOI) within 14 days of each other  · The list below includes some of the most commonly used medicines that can interact with carbamazepine  There are many other drugs not listed  Make sure your doctor knows the names of all the medicines you use  Tell your doctor if you are using any of the following:   ¨ Alprazolam, aprepitant, aripiprazole, buspirone, chloroquine, citalopram, clarithromycin, clomipramine, clonazepam, clozapine, cyclophosphamide, cyclosporine, diltiazem, doxorubicin, erythromycin, everolimus, felodipine, fluoxetine, imatinib, isoniazid, lamotrigine, lapatinib, lithium, loxapine, mefloquine, methadone, phenytoin, praziquantel, primidone, propoxyphene, quetiapine, quinine, sirolimus, tacrolimus, temsirolimus, tramadol, trazodone, triazolam, valproate, verapamil, or zileuton  ¨ HIV protease inhibitor (including atazanavir, darunavir, fosamprenavir, indinavir, lopinavir, ritonavir, saquinavir)  ¨ Medicine to treat fungal infection (including fluconazole, itraconazole, ketoconazole, voriconazole)  ¨ Steroid (including dexamethasone, prednisolone, prednisone)  · Birth control pills, shots, and other hormonal birth control methods may not work as well while you use this medicine  You may want to use a second form of birth control  · Do not eat grapefruit or drink grapefruit juice while you are using this medicine  · Tell your doctor if you use anything else that makes you sleepy  Some examples are allergy medicine, narcotic pain medicine, and alcohol  Warnings While Using This Medicine:   · It is not safe to take this medicine during pregnancy  It could harm an unborn baby  Tell your doctor right away if you become pregnant    · Tell your doctor if you are breastfeeding, or if you have kidney disease, liver disease, glaucoma, heart disease, heart rhythm problems, porphyria, an intolerance to fructose, or a history of bone marrow depression, suicidal thoughts, or depression  Tell your doctor if you had an allergic reaction to any other medicine (especially seizure medicines)  · Tell your doctor if you have  ancestry  Your doctor may test you for serious skin reactions before prescribing this medicine  · This medicine may cause the following problems:  ¨ Serious skin reactions  ¨ Aplastic anemia or other blood problems  ¨ Drug reaction with eosinophilia and systemic symptoms (DRESS), which may damage organs such as the liver, kidney, or heart  ¨ Suicidal thoughts or behavior  ¨ Low sodium levels in the blood  ¨ Liver damage  · Do not stop using this medicine suddenly  Your doctor will need to slowly decrease your dose before you stop it completely  · Tell any doctor or dentist who treats you that you are using this medicine  This medicine may affect certain medical test results  · This medicine may make you dizzy or drowsy  Do not drive or do anything else that could be dangerous until you know how this medicine affects you  · Your doctor will do lab tests at regular visits to check on the effects of this medicine  Keep all appointments  Your doctor may want to have your eyes checked by an eye doctor  · Keep all medicine out of the reach of children  Never share your medicine with anyone    Possible Side Effects While Using This Medicine:   Call your doctor right away if you notice any of these side effects:  · Allergic reaction: Itching or hives, swelling in your face or hands, swelling or tingling in your mouth or throat, chest tightness, trouble breathing  · Blistering, peeling, red skin rash  · Blurred vision, changes in vision  · Change in how much or how often you urinate  · Chest pain, trouble breathing, cold sweats, bluish skin  · Confusion, memory problems, unusual tiredness, muscle spasms or weakness  · Dark urine or pale stools, nausea, vomiting, loss of appetite, stomach pain, yellow skin or eyes  · Fast, slow, pounding, or uneven heartbeat  · Fever, chills, cough, sore throat, or sores in your mouth  · Lightheadedness or fainting  · Swollen, painful, or tender lymph glands in your neck, armpit, or groin  · Thoughts of hurting yourself or others, unusual thoughts or behavior  · Unusual bleeding, bruising, or weakness  If you notice these less serious side effects, talk with your doctor:   · Anxiety, agitation, depression, restlessness, or trouble sleeping  · Dizziness or drowsiness  · Dry mouth  · Mild nausea, vomiting, constipation  If you notice other side effects that you think are caused by this medicine, tell your doctor  Call your doctor for medical advice about side effects  You may report side effects to FDA at 2-867-FDA-7869  © 2017 [address] [employer]  Information is for End User's use only and may not be sold, redistributed or otherwise used for commercial purposes  The above information is an  only  It is not intended as medical advice for individual conditions or treatments  Talk to your doctor, nurse or pharmacist before following any medical regimen to see if it is safe and effective for you  Lorazepam (By mouth)   Lorazepam (vli-DV-c-chente)  Treats anxiety  Brand Name(s): Ativan, LORazepam Intensol   There may be other brand names for this medicine  When This Medicine Should Not Be Used: This medicine is not right for everyone  Do not use it if you had an allergic reaction to lorazepam or similar medicines, or you are pregnant or breastfeeding, or you have acute narrow-angle glaucoma  How to Use This Medicine:   Liquid, Tablet  · Take your medicine as directed  Your dose may need to be changed several times to find what works best for you    · Oral liquid:   ¨ Measure the oral liquid medicine with a marked measuring spoon, oral syringe, or medicine cup  ¨ Mix the medicine with water, juice, soda, applesauce, or pudding  Drink or eat the mixture right away  Do not store it for later use  · This medicine should come with a Medication Guide  Ask your pharmacist for a copy if you do not have one  · Missed dose: Take a dose as soon as you remember  If you are more than 1 hour late, skip the missed dose and wait until it is time for your next dose  Do not use extra medicine to make up for a missed dose  ·   ¨ Oral liquid: Refrigerate the oral liquid  Throw away an opened bottle after 90 days  ¨ Tablets: Store the medicine in a closed container at room temperature, away from heat, moisture, and direct light  Drugs and Foods to Avoid:   Ask your doctor or pharmacist before using any other medicine, including over-the-counter medicines, vitamins, and herbal products  · Some medicines can affect how lorazepam works  Tell your doctor if you are using any of the following:   ¨ Aminophylline, clozapine, probenecid, theophylline, valproate  ¨ Medicine to treat depression or mental health problems  ¨ Medicine to treat seizures  · Do not drink alcohol while you are using this medicine  · Tell your doctor if you use anything else that makes you sleepy  Some examples are allergy medicine, narcotic pain medicine, and alcohol  Warnings While Using This Medicine:   · It is not safe to take this medicine during pregnancy  It could harm an unborn baby  Tell your doctor right away if you become pregnant  · Tell your doctor if you have kidney disease, liver disease, lung or breathing problems (such as COPD, sleep apnea), or a history of drug or alcohol abuse, depression, or seizures  · This medicine can be habit-forming  Do not use more than your prescribed dose  Call your doctor if you think your medicine is not working  · Do not stop using this medicine suddenly   Your doctor will need to slowly decrease your dose before you stop it completely  · This medicine may make you drowsy  Do not drive or do anything else that could be dangerous until you know how this medicine affects you  · Your doctor will do lab tests at regular visits to check on the effects of this medicine  Keep all appointments  · Keep all medicine out of the reach of children  Never share your medicine with anyone  Possible Side Effects While Using This Medicine:   Call your doctor right away if you notice any of these side effects:  · Allergic reaction: Itching or hives, swelling in your face or hands, swelling or tingling in your mouth or throat, chest tightness, trouble breathing  · Confusion, unusual mood or behavior, thoughts of hurting yourself  · Seizures  · Severe drowsiness or weakness, slow heartbeat, trouble breathing  · Worsening of depression  If you notice these less serious side effects, talk with your doctor:   · Dizziness, clumsiness  If you notice other side effects that you think are caused by this medicine, tell your doctor  Call your doctor for medical advice about side effects  You may report side effects to FDA at 2-662-FDA-1124  © 2017 [facility] Information is for End User's use only and may not be sold, redistributed or otherwise used for commercial purposes  The above information is an  only  It is not intended as medical advice for individual conditions or treatments  Talk to your doctor, nurse or pharmacist before following any medical regimen to see if it is safe and effective for you  Lamotrigine (By mouth)   Lamotrigine (la-FRANSISCO-tri-jeen)  Treats seizures and bipolar disorder  Brand Name(s):  LaMICtal, LaMICtal CD, LaMICtal ODT, LaMICtal ODT Patient Titration Kit Blue, LaMICtal ODT Patient Titration Kit Loreta Eans, LaMICtal ODT Patient Titration Kit Orange, [employer] Kit Green, [employer], LaMICtal XR, LaMICtal XR Patient Titration Kit Blue, LaMICtal XR Patient Titration Kit Green, LaMICtal XR Patient Titration Kit Orange   There may be other brand names for this medicine  When This Medicine Should Not Be Used: This medicine is not right for everyone  Do not use it if you had an allergic reaction to lamotrigine  How to Use This Medicine:   Tablet, Chewable Tablet, Dissolving Tablet, Long Acting Tablet  · Take your medicine as directed  Your dose may need to be changed several times to find what works best for you  · Chewable tablet: You may swallow the tablet whole, or you may chew it and then swallow a small amount of water or diluted fruit juice  You may also dissolve the chewable tablet  To do this, put about 1 teaspoon of water or juice in a glass, drop in the tablet, let it sit for about 1 minute so it dissolves, swirl the glass to mix, and then swallow the entire mixture  · Disintegrating tablet: Make sure your hands are dry before you handle the tablet  Place the tablet on your tongue  Move the tablet around in your mouth so it dissolves  · Regular tablet: Swallow the tablet whole  You may break or crush the tablet if your doctor tells you to, but the medicine might leave a bitter taste in your mouth  · Swallow the extended-release tablet whole  Do not crush, break, or chew it  · This medicine should come with a Medication Guide  Ask your pharmacist for a copy if you do not have one  · Missed dose: Take a dose as soon as you remember  If it is almost time for your next dose, wait until then and take a regular dose  Do not take extra medicine to make up for a missed dose  · Store the medicine in a closed container at room temperature, away from heat, moisture, and direct light  Do not use if the blister pack is torn or broken  Drugs and Foods to Avoid:   Ask your doctor or pharmacist before using any other medicine, including over-the-counter medicines, vitamins, and herbal products  · Some foods and medicines can affect how lamotrigine works   Tell your doctor if you are using any of the following:  ¨ Atazanavir, ritonavir, lopinavir, rifampin  ¨ Birth control pills  ¨ Other medicine to control seizures, including carbamazepine, divalproex, oxcarbazepine, phenobarbital, phenytoin, primidone, valproic acid, valproate  Warnings While Using This Medicine:   · Tell your doctor if you are pregnant or breastfeeding, or if you have kidney disease, liver disease, or a history of depression  Tell your doctor if you have had a rash or an allergic reaction to other seizure medicine  · This medicine may cause the following problems:  ¨ Drug reaction with eosinophilia and systemic symptoms (DRESS), which may damage organs such as the liver, kidney, or heart  ¨ Increased risk of thoughts of suicide or other serious mood changes  ¨ Low blood cell counts, which may cause bleeding problems or increase your risk for infection  ¨ Meningitis  ¨ Severe skin rash that can lead to hospitalization or death  · This medicine may make you dizzy or drowsy  Do not drive or do anything else that could be dangerous until you know how this medicine affects you  · Do not stop using this medicine suddenly  Your doctor will need to slowly decrease your dose before you stop it completely  · Your doctor will do lab tests at regular visits to check on the effects of this medicine  Keep all appointments  · Keep all medicine out of the reach of children  Never share your medicine with anyone    Possible Side Effects While Using This Medicine:   Call your doctor right away if you notice any of these side effects:  · Allergic reaction: Itching or hives, swelling in your face or hands, swelling or tingling in your mouth or throat, chest tightness, trouble breathing  · Blistering, peeling, or red skin rash  · Feeling depressed, irritable, or restless  · Fever, chills, cough, sore throat, and body aches  · Fever, skin rash, or swollen glands in your armpits, neck, or groin  · Painful sores in your mouth or around your eyes  · Stiff neck or back, headache, fever, nausea, vomiting  · Thoughts of hurting yourself, other unusual thoughts or behaviors  · Unusual bleeding, bruising, or weakness  · Yellow skin or eyes  If you notice these less serious side effects, talk with your doctor:   · Blurred vision, double vision, or other vision problems  · Clumsiness, dizziness, sleepiness, problems with balance or walking  · Nausea, vomiting  · Runny or stuffy nose  If you notice other side effects that you think are caused by this medicine, tell your doctor  Call your doctor for medical advice about side effects  You may report side effects to FDA at 7-829-XUB-3132  © 2017 [facility] Information is for End User's use only and may not be sold, redistributed or otherwise used for commercial purposes  The above information is an  only  It is not intended as medical advice for individual conditions or treatments  Talk to your doctor, nurse or pharmacist before following any medical regimen to see if it is safe and effective for you  Benztropine Mesylate (By mouth)   Benztropine Mesylate (YHOZ-arhu-sqtu MES-i-late)  Treats symptoms of Parkinson's disease or side effects of other drugs  Brand Name(s):   There may be other brand names for this medicine  When This Medicine Should Not Be Used: You should not use this medicine if you have had an allergic reaction to benztropine  This medicine should not be taken by children under the age of three  How to Use This Medicine:   Tablet, Liquid  · Your doctor will tell you how much to take and how often  · Benztropine may be taken with or without food  · Measure the oral liquid medicine with a marked measuring spoon, oral syringe, or medicine cup  If a dose is missed:   · Take the missed dose as soon as possible  · If it is almost time for your next usual dose, wait until then to take your medicine and skip the missed dose    · You should not use two doses at the same time  How to Store and Dispose of This Medicine:   · Store at room temperature, away from heat, moisture, and direct light  · Keep all medicine out of the reach of children  Drugs and Foods to Avoid:   Ask your doctor or pharmacist before using any other medicine, including over-the-counter medicines, vitamins, and herbal products  · Avoid drinking alcohol while taking this medicine  · Make sure your doctor knows if you are taking other medicines that make you sleepy such as sleeping pills, cold and allergy medicines, medicine for depression, tranquilizers, or narcotic pain killers  Warnings While Using This Medicine:   · Talk with your doctor before taking this medicine if you have glaucoma, bowel or stomach problems, enlarged prostate, fast or irregular heartbeat, or other medical problems  · This medicine may make you drowsy  Be careful while driving, using machinery, or doing dangerous jobs  · This medicine may make you sweat less and cause your body to get too hot  Be careful in hot weather, while you are exercising, or if using a sauna or whirlpool  · Tell your doctor if this medicine causes muscle weakness  If your neck muscles are stiff and suddenly become weak, your doctor may need to lower the dose you are taking  Possible Side Effects While Using This Medicine:   Call your doctor right away if you notice any of these side effects:  · Severe skin rash or hives  · Confusion or excitement  · Seeing or hearing unusual things  · Fast or irregular heartbeat  · Severe eye pain  · Severe constipation or stomach pain  · Unexplained high fever  If you notice these less serious side effects, talk with your doctor:   · Dry mouth  · Trouble urinating  · Nausea or vomiting  · Blurred vision  If you notice other side effects that you think are caused by this medicine, tell your doctor  Call your doctor for medical advice about side effects   You may report side effects to FDA at 2-335-FDA-5688  © 2017 [facility] Information is for End User's use only and may not be sold, redistributed or otherwise used for commercial purposes  The above information is an  only  It is not intended as medical advice for individual conditions or treatments  Talk to your doctor, nurse or pharmacist before following any medical regimen to see if it is safe and effective for you

## 2018-10-01 NOTE — DISCHARGE INSTR - LAB
Contact Information: If you have any questions, concerns, pended studies, tests and/or procedures, or emergencies regarding your inpatient behavioral health visit  Please contact [facility] behavioral health unit [phone] and ask to speak to a , nurse or physician  A contact is available 24 hours/ 7 days a week at this number  Summary of Procedures Performed During your Stay:  Below is a list of major procedures performed during your hospital stay and a summary of results:  - No major procedures performed  Pending Studies     Start     Ordered    10/01/18 0000  Carbamazepine level, total      10/01/18 1223    10/01/18 0000  CBC and differential      10/01/18 1223    10/01/18 0000  Comprehensive metabolic panel      31/14/46 1223        If studies are pending at discharge, follow up with your PCP and/or referring provider

## 2018-10-01 NOTE — PROGRESS NOTES
Pt reports that her stomach feels better after taking the Maalox but that she is no less anxious  Is out in the milieu participating in group

## 2018-10-01 NOTE — PLAN OF CARE
DISCHARGE PLANNING     Discharge to home or other facility with appropriate resources Completed        Ineffective Coping     Participates in unit activities Completed        SELF HARM/SUICIDALITY     Will have no self-injury during hospital stay Completed

## 2018-10-02 ENCOUNTER — OFFICE VISIT (OUTPATIENT)
Dept: PSYCHIATRY | Facility: CLINIC | Age: 43
End: 2018-10-02
Payer: MEDICARE

## 2018-10-02 ENCOUNTER — OFFICE VISIT (OUTPATIENT)
Dept: PSYCHOLOGY | Facility: CLINIC | Age: 43
End: 2018-10-02
Payer: MEDICARE

## 2018-10-02 VITALS
SYSTOLIC BLOOD PRESSURE: 122 MMHG | DIASTOLIC BLOOD PRESSURE: 80 MMHG | TEMPERATURE: 97.7 F | HEIGHT: 62 IN | BODY MASS INDEX: 45.45 KG/M2 | WEIGHT: 247 LBS | HEART RATE: 90 BPM | RESPIRATION RATE: 20 BRPM

## 2018-10-02 DIAGNOSIS — F31.4 SEVERE DEPRESSED BIPOLAR I DISORDER WITHOUT PSYCHOTIC FEATURES (HCC): Primary | ICD-10-CM

## 2018-10-02 DIAGNOSIS — F41.1 GAD (GENERALIZED ANXIETY DISORDER): Primary | Chronic | ICD-10-CM

## 2018-10-02 DIAGNOSIS — F31.4 BIPOLAR I DISORDER, MOST RECENT EPISODE DEPRESSED, SEVERE WITHOUT PSYCHOTIC FEATURES (HCC): Chronic | ICD-10-CM

## 2018-10-02 DIAGNOSIS — F90.0 ADHD, PREDOMINANTLY INATTENTIVE TYPE: Chronic | ICD-10-CM

## 2018-10-02 PROBLEM — J40 BRONCHITIS: Status: RESOLVED | Noted: 2018-05-07 | Resolved: 2018-10-02

## 2018-10-02 PROCEDURE — 99215 OFFICE O/P EST HI 40 MIN: CPT | Performed by: PSYCHIATRY & NEUROLOGY

## 2018-10-02 PROCEDURE — G0177 OPPS/PHP; TRAIN & EDUC SERV: HCPCS

## 2018-10-02 PROCEDURE — G0176 OPPS/PHP;ACTIVITY THERAPY: HCPCS

## 2018-10-02 PROCEDURE — G0410 GRP PSYCH PARTIAL HOSP 45-50: HCPCS

## 2018-10-02 PROCEDURE — 90791 PSYCH DIAGNOSTIC EVALUATION: CPT
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
## 2018-10-02 NOTE — PSYCH
Subjective:     Patient ID: Cheryle Bastos is a 38 y o  female  [facility] Clinical Progress Notes      Specialized Services Documentation  Therapist must complete separate progress note for each specific clinical activity in which the individual participated during the day  Group Psychotherapy   [phone]) Francisca Tan was excused from psychotherapy group for initial assessment with     Tx Plan Objective: n/a, Therapist:  Oly BARBOSA

## 2018-10-02 NOTE — PSYCH
Subjective:     Patient ID: Balbina Shaikh is a 38 y o  female  [facility] Clinical Progress Notes      Specialized Services Documentation  Therapist must complete separate progress note for each specific clinical activity in which the individual participated during the day  Group Psychotherapy    [id number]  Alicia Alonso participated in [address] group focused on developing and following a routine in daily life to make life more organized and manageable especially in recovery from mental illness  Alicia Alonso shared with peers that her time is very limited because of her many obligations including "getting her children off to school" and return to school herself for her masters degree  She spoke about how structure can be helpful to organizing and getting things done in a timely manner  Peers gave support to Alicia Alonso to look at her present routine and finding "me time" to increase healthy functioning  Alicia Alonso made good beginning progress toward goals  Continue to offer group to educate on the benefits of following routines to serve as healthy structure and protect against relapse  1 1,1 2,1 4    Tx Plan Objective: Therapist:  New Simon RN      Case Management Note [id number]  This CM met with Alicia Alonso to complete initial evaluation and ROIs were signed  Crisis/contact card was given and explained  OP providers/PCP notified of admission and health care coordination form completed  Initial psychosocial evaluation completed and initial treatment plan goals discussion  Alicia Alonso denied suicidal or homicidal ideas, plans or intent  She stated that she was recently discharged from inpatient [facility] unit and that although she did not agree originally with her OP psychiatrist that she needed to go inpatient, she stated that "I really did need it and the doctor (Dr Munira Sousa) was right "     New Simon RN    Current suicide risk : Low     Medications changes/added/denied?  No    Treatment session number: 1    Individual Case Management Visit provided today?  Yes     [facility] follow up physician's orders: Admit to [facility]

## 2018-10-02 NOTE — PSYCH
Diagnoses and all orders for this visit:      SANA (generalized anxiety disorder)     Bipolar I disorder, most recent episode depressed, severe without psychotic features ([facility] [address] )     ADHD, predominantly inattentive type    Subjective:     Patient ID: Margi Lares is a 38year old female     [facility] Treatment Plan   AREAS OF NEED:  Depression and anxiety as manifested by recent inpatient [facility] hospitalization due to recent intensive and intrusive suicidal ideation with plan, feeling overwhelmed, tearful, anxious, poor concentration, feeling hopeless, helplessness, 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, poor appetite, weight gain, hypersomnia, mood swings and anxiety attacks  Date Initiated: 10/2/18     Strengths:  Some insight into need for treatment and willingness to participate in [facility]      LONG TERM GOAL:   Date Initiated: 10/2/18  1 0 I will identify three signs that my mood is more controlled and I am using healthy coping skills to handle my stress, anxiety and depression  Target Date: 10/30/18  Revision Date:  Completion Date:      SHORT TERM OBJECTIVES:    Date Initiated: 10/2/18  1 1 I will identify and practice daily three healthy ways to refocus myself when I am feeling overwhelmed, anxious or depressed  Revision Date:   Target Date: 10/11/18  Completion Date:      Date Initiated: 10/2/18  1 2 I will write out three healthy coping skills learned in [facility] to reduce my symptoms of anxiety and I will practice at least one of them daily  Revision Date:  Target Date: 10/11/18  Completion Date:     Date Initiated:  10/2/18  1 3 I will take medications as prescribed and share any questions or concerns when, or if, they arise    Revision Date:   Target Date: 10/11/18  Completion Date:      Date Initiated:  10/2/18  1 4 I will name three supports and I will identify specific ways my supports can assist me in my recovery  Revision Date:   Target Date: 10/11/18  Completion Date:         PSYCHIATRY:  Medication Management and education  Date Initiated: 10/2/18  Revision Date:   The person(s) responsible for carrying out the plan is Yesi Serrano MD     NURSIN 1,1 2,1 3,1 4 This RN will provide daily wellness group five days weekly to educate Olivia Ventura on individual diagnoses and medications used in treatment  Date Initiated: 10/2/18  Revision Date:  The person(s) responsible for carrying out the plan is Maria Del Rosario Avila RN     PSYCHOLOGY:   1 1,1 2,1 3,1 4 Provide psychotherapy group five times weekly to allow opportunity for Olivia Kylieing to explore stressors and ways of coping  Date Initiated: 10/2/18  Revision Date:   The person(s) responsible for carrying out the plan is Ta Trammell MSW,LSW     ALLIED THERAPY:   1 1,1 2  Engage Olivia Ventura in AT group five times weekly to encourage development and use of wellness tools to decrease symptoms and promote recovery through meaningful activity  Date Initiated: 10/2/18  Revision Date:  The person(s) responsible for carrying out the plan is WOODROW Johnson      CASE MANAGEMENT:  Date Initiated: 10/2/18  Revision Date:  1 0 This CM will meet with Olivia Ventura three to four times weekly to assess treatment progress, D/C planning and connection to community supports and UR as indicated  The person(s) responsible for carrying out the plan is Maria Del Rosario Avila RN     DISCHARGE CRITERIA: Identify 3 signs of progress and complete relapse prevention plan  DISCHARGE PLAN: Return to prior outpatient providers; Dr Emily Hurley [facility]    Estimated Length of Stay: 10 days    DSM Diagnoses:    SANA (generalized anxiety disorder)     Bipolar I disorder, most recent episode depressed, severe without psychotic features [facility])     ADHD, predominantly inattentive type    Diagnosis and Treatment Plan explained to Olivia Espinaling relates understanding diagnosis and Olivia Messing is agreeable to Treatment Plan         CLIENT COMMENTS / Please share your thoughts, feelings, need and/or experiences regarding your treatment plan: _____________________________________________________________________________________________________________________________________________________________________________________________________________________________________________________________________________________________________________________ Date/Time: ______________     Patient Signature: _________________________________     Date/Time: ______________      Signature: _________________________________     Date/Time: ______________
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
## 2018-10-02 NOTE — PSYCH
Subjective:     Patient ID: Jose Nielsen is a 38 y o  female  [facility] Clinical Progress Notes      Specialized Services Documentation  Therapist must complete separate progress note for each specific clinical activity in which the individual participated during the day  Allied Therapy   [id number] Mario Lund actively shared in [facility] group focused on perception  Group explored development of thoughts based on perception and ways to make choices related to view we take of situations and self  She engaged in tasks, yet was quiet which is reasonable for first day in treatment  Group discussed strategies to explore ways to reframe perception to helpful versus hurtful  Some initial effort noted toward goal   Continue AT to explore wellness strategies and personal role in reframing thoughts       Tx Plan Objective: 1 1, Therapist:  Kasey TROY    Education Therapy   Time:  7775-0158  Previous goal met: first treatment day  Readiness to Learning: Receptive  Barriers to Learning: None  Learning Assessment  Time: 9105-0979  Education Completed: Illness, Medication and Wellness Tools  Teaching Method: Verbal  Shared Area of Learning: Yes   Goal Set: be open with her family about her anxiety  Tx Plan Objective: 1 4, Therapist:  Kasey TROY

## 2018-10-03 ENCOUNTER — OFFICE VISIT (OUTPATIENT)
Dept: PSYCHOLOGY | Facility: CLINIC | Age: 43
End: 2018-10-03
Payer: MEDICARE

## 2018-10-03 DIAGNOSIS — F31.4 SEVERE DEPRESSED BIPOLAR I DISORDER WITHOUT PSYCHOTIC FEATURES (HCC): Primary | ICD-10-CM

## 2018-10-03 PROCEDURE — G0410 GRP PSYCH PARTIAL HOSP 45-50: HCPCS

## 2018-10-03 PROCEDURE — G0177 OPPS/PHP; TRAIN & EDUC SERV: HCPCS

## 2018-10-03 PROCEDURE — G0176 OPPS/PHP;ACTIVITY THERAPY: HCPCS

## 2018-10-03 NOTE — PSYCH
Subjective:     Patient ID: Smith Rodriguez is a 38 y o  female  [facility] Clinical Progress Notes      Specialized Services Documentation  Therapist must complete separate progress note for each specific clinical activity in which the individual participated during the day  Allied Therapy   [id number] Olivia Ventura actively shared in [facility] group focused on relaxation techniques and anxiety reduction  She was observed to be engaged in therapist led relaxation exercises and noticed her tension in her hands  Group discussed specific items that could help self soothe if in an anxiety crisis with encouragement to use these things regularly to manage stressors consistently  She reported multiple items she could use in a self soothe kit including ear plugs and word searches  Some initial effort noted toward tx goal   Continue AT to encourage development of wellness skills and consistent practice      Tx Plan Objective: 1 2, Therapist:  Bob TROY    Education Therapy   Time:  1516-2638  Previous goal met: Yes   Readiness to Learning: Receptive  Barriers to Learning: None  Learning Assessment  Time: 2635-4126  Education Completed: Illness and Wellness Tools  Teaching Method: Verbal, Written and Demonstration  Shared Area of Learning: Yes   Goal Set: work of getting in touch with her professors  Tx Plan Objective: 1 2,1 4, Therapist:  Bob TROY

## 2018-10-03 NOTE — PSYCH
Subjective:     Patient ID: Abdullahi Pugh is a 38 y o  female  [facility] Clinical Progress Notes      Specialized Services Documentation  Therapist must complete separate progress note for each specific clinical activity in which the individual participated during the day  Group Psychotherapy   ([id number]) Group opened with introductions, thought for the day, and identifying one stressor  Cara Ernst identified "microdramas" as her stressor as she tends to take on other people's anxiety and stress  During discussion the topic of not talking about suicidal ideation resonated with most of the group  Cara Ernst connected with many other group members about feeling alone and unsupported by her spouse  She described her communication pattern as holding in negative feelings allowing them to "snowball" and become consuming  Cara Ernst also discussed having lost trust in her supports outside of her spouse, specifically her psychiatrist who had sent her to inpatient tx  She processed with group ways to address this with the therapist and work on rebuilding trust and improving communication  She will continue partial program as scheduled     Tx Plan Objective: 1 1, 1 3, 1 4  Therapist: Mil Cardenas MA Intern  Shona BARBOSA

## 2018-10-03 NOTE — PSYCH
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 and "a crisis could happen at any time " This was stressful for her as it was a daily occurrence, not isolated  Lesley Montoya stated that she would "dream about the job because I was so anxious and be thinking about it and worrying about it even when not there " Lesley Montoya also stated she was stressing herself in addition by thinking how much education she had in this field and "feeling trapped " She shared that she had quit smoking and began to smoke again and eat "lots of junk food" to cope with job stress  Lesley Orrelidia discussed with peers alternative, healthier ways to handle stress  She made good beginning progress toward goals  Continue to provide education on strategies that can successfully be utilized  to cope with acute, and chronic, stressors to encourage healthier coping and to decrease mental health symptoms and risk of relapse  1 1,1 2,1 4    Tx Plan Objective: Therapist:  Lauren Mendoza RN                    Case Management Note [id number]  Lesley Lindsay met with this CM to discuss progress in Program and discharge planning  Lesley Montoya reviewed and signed initial treatment plan  She stated that she is feeling less anxious today in group  Lesley Orrelidia will be excused from [facility] on Friday, 10/5/18 to meet with her OP psychiatrist Dr Michael Montoya stated that she feels PHP will be helpful to her recovery   She denied any suicidal or homicidal ideas, intent or plan  Desiree Richard denied any side effects from medications and is taking as directed she stated  Ena Roberson RN    Current suicide risk : Low     Medications changes/added/denied? No    Treatment session number: 2    Individual Case Management Visit provided today?  Yes     [facility] follow up physician's orders: N/A

## 2018-10-04 ENCOUNTER — OFFICE VISIT (OUTPATIENT)
Dept: PSYCHOLOGY | Facility: CLINIC | Age: 43
End: 2018-10-04
Payer: MEDICARE

## 2018-10-04 DIAGNOSIS — F31.4 SEVERE DEPRESSED BIPOLAR I DISORDER WITHOUT PSYCHOTIC FEATURES (HCC): Primary | ICD-10-CM

## 2018-10-04 PROCEDURE — G0177 OPPS/PHP; TRAIN & EDUC SERV: HCPCS

## 2018-10-04 PROCEDURE — G0176 OPPS/PHP;ACTIVITY THERAPY: HCPCS

## 2018-10-04 PROCEDURE — G0410 GRP PSYCH PARTIAL HOSP 45-50: HCPCS

## 2018-10-04 NOTE — PSYCH
Subjective:     Patient ID: Neha Krause is a 38 y o  female  [facility] Clinical Progress Notes      Specialized Services Documentation  Therapist must complete separate progress note for each specific clinical activity in which the individual participated during the day  Allied Therapy   [id number]  Neha Krause actively shared in [facility] group focused DBT Module Interpersonal Effectiveness and CRYS skill  Group engaged in tasks exploring what makes it difficult to share and strategies to improve with supports  Kyara Degroot actively participated in nonverbal communication exercise  She shared that "fear" often hinders communication of requests or saying no  She verbally expressed that it was difficult to concentrate on task, but she actively participated as group worked together to practice writing out a meaningful interaction using CRYS   She discussed ways to express her emotions and assert her needs with others  Beginning progress toward goal noted  Continue AT to encourage sharing, personal advocacy and practice of wellness tools    Tx Plan Objective: 1 4, Therapist: Matilde Matias    Education Therapy   Time:  3876-4850  Previous goal met: No  Readiness to Learning: Other Presents as contrary  Barriers to Learning: None  Learning Assessment  Time: 0013-5192  Education Completed: Illness, Aftercare and Wellness Tools  Teaching Method: Verbal, Written and Demonstration  Shared Area of Learning: Yes   Goal Set: Talk to  about splitting duties  Tx Plan Objective: 1 1, 1 2, 1 4, Therapist: Matilde Matias

## 2018-10-04 NOTE — PSYCH
MEDICATION MANAGEMENT NOTE        [facility]      Name and Date of Birth:  Mike Guadarrama 38 y o  1975 MRN: [id number]    Date of Visit: October 5, 2018    SUBJECTIVE:    Dada Duran was hospitalized at [address] [place] [address] 9/26/18 to 10/01/18  She started [facility] on 10/2/18 - feels going to the program helps with mood and states that she has been feeling less depressed and more hopeful  She still experiences significant anxiety symptoms "I worry about everything everyday  I worry that people talk about me because I had to be hospitalized"  Tearful today when talking about it, but trying to cope  States  has been very supportive  Was able to get medical leave at school to attend Partial Program    She denies any suicidal ideation, intent or plan at present; denies any homicidal ideation, intent or plan at present  She has no auditory hallucinations, denies any visual hallucinations, no overt delusions noted  She denies any side effects from current psychiatric medications  No rash noted or reported  Joceline Suero HPI ROS Appetite Changes and Sleep: normal sleep, normal appetite, recent weight gain (2 lbs), normal energy level    Review Of Systems:      Constitutional recent weight gain (2 lbs)   ENT negative   Cardiovascular negative   Respiratory negative   Gastrointestinal negative   Genitourinary negative   Musculoskeletal negative   Integumentary negative   Neurological negative   Endocrine negative   Other Symptoms none       Past Inpatient Psychiatric Treatment:   4 past inpatient psychiatric admissions at [facility] 10/2018, [facility] and hospitals in [place]  Past Outpatient Psychiatric Treatment:    In outpatient treatment at [address] [place] [address] [address] since 2017  Past Suicide Attempts: yes, 6 attempts by overdose, jumping out of a parking garage and  driving car into another car  Last attempt was in   Past Violent Behavior: no  Past Psychiatric Medication Trials: Zoloft, Paxil, Lexapro, Effexor, Trazodone, Depakote, Tegretol, Lithium, Lamictal, Trileptal, Neurontin, Risperdal, Abilify, Seroquel, Zyprexa, Geodon, Latuda, Klonopin, ativan, Xanax, Valium and Strattera    Past Medical History:    Past Medical History:   Diagnosis Date    Acute pancreatitis     Last Assessed: 2016; due to gallstones (removed)    Anemia     Anxiety     Arthralgia     Concussion     Last Assessed:     Endometriosis     Esophageal reflux     Familial combined hyperlipidemia     Last Assessed: 2013    Gastropathy     Head injury     History of sinusitis     Irregular heart beat     Migraine     Last Assessed: 2012    Paroxysmal supraventricular tachycardia ([place] [address] )     Peptic ulcer disease     Spontaneous      Substance abuse ([place] [address] )     Suicide attempt ([place] [address] )     UTI (urinary tract infection)     Vitamin D deficiency      Past Medical History Pertinent Negatives:   Diagnosis Date Noted    Addiction to drug ([place] [address] ) 2018    Adjustment disorder 2018    Alcohol abuse 2018    Alcoholism ([place] [address] ) 2018    Anorexia nervosa 2018    Autism spectrum disorder 2018    Borderline personality disorder ([place] [address] ) 2018    Bulimia nervosa 2018    Cancer ([place] [address] ) 2018    Chronic kidney disease 2018    Chronic pain disorder 2018    Cognitive impairment 2018    CVA (cerebral vascular accident) ([place] [address] ) 2018    Dementia 2018    Disease of thyroid gland 2018    Eating disorder 2018    Hallucination 2018    Heart disease 2018    History of electroconvulsive therapy 2018    HIV disease ([facility] ) 2018    Impulse control disorder 2018    Liver disease 2018    Memory loss 2018    Myocardial infarction ([place] [address] ) 2018    Obsessive-compulsive disorder 09/26/2018    Oppositional defiant disorder 09/26/2018    Panic attack 09/26/2018    Panic disorder 09/26/2018    Peripheral neuropathy 09/26/2018    Psychosis ([facility] ) 09/26/2018    PTSD (post-traumatic stress disorder) 09/26/2018    Schizoaffective disorder ([facility] ) 09/26/2018    Schizophrenia ([facility] ) 09/26/2018    Seizures ([place] [address] )     Self-injurious behavior 09/26/2018    Sleep difficulties 09/26/2018    Violence, history of 09/26/2018    Withdrawal symptoms, alcohol ([facility] ) 09/26/2018    Withdrawal symptoms, drug or narcotic ([place] [address] ) 09/26/2018     Allergies   Allergen Reactions    Geodon [Ziprasidone]      Nystagmus    Other      Seasonal allergies    Quetiapine      Other reaction(s): Other (See Comments)  Per pt low blood pressure and fainting       Substance Abuse History:    History   Alcohol Use    Yes     Comment: Social alcohol     History   Drug Use No     Comment: Past cocaine and marijuana use for 2 years until 2001  Also tried LSD years ago  No current recreational drug use       Social History:    Social History     Social History    Marital status: /Civil Union     Spouse name: N/A    Number of children: 2    Years of education: bachelor's degree     Occupational History    on disability      Social History Main Topics    Smoking status: Former Smoker     Quit date: 3/1/2015    Smokeless tobacco: Never Used      Comment: Quit    Alcohol use Yes      Comment: Social alcohol    Drug use: No      Comment: Past cocaine and marijuana use for 2 years until 2001  Also tried LSD years ago   No current recreational drug use    Sexual activity: Yes     Partners: Male     Birth control/ protection: Male Sterilization     Other Topics Concern    Not on file     Social History Narrative    Education: bachelor's degree in psychology; currently in [employer] degree program in Social Work at [employer] - on medical leave currently    Learning Disabilities: none    Marital History:  (second marriage)    Children: 2 sons    Living Arrangement: lives in home with , 2 sons and stepdaughter    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[employer], currently unemployed, on permanent disability    Functioning Relationships: good support system,  is supportive    Legal History: none     History: None       Family Psychiatric History:     Family History   Problem Relation Age of Onset    Hyperthyroidism Mother     Thyroid disease Mother     Depression Father     Hypertension Father     Obesity Father     Other Paternal Grandmother         Cardiac Disorder    Dementia Paternal Grandmother     Hypertension Paternal Grandmother     Hypertension Paternal Grandfather     Other Paternal Grandfather         Cardiac Disorder    Schizophrenia Other     Schizophrenia Other     Suicidality Other     Completed Suicide  Other     Breast cancer Other        History Review:  The following portions of the patient's history were reviewed and updated as appropriate: allergies, current medications, past family history, past medical history, past social history, past surgical history and problem list          OBJECTIVE:     Vital signs in last 24 hours:    Vitals:    10/05/18 1403   BP: 133/86   Pulse: 96   Weight: 113 kg (249 lb)   Height: 5' 2" (1 575 m)       Mental Status Evaluation:    Appearance age appropriate, casually dressed   Behavior cooperative, restless and fidgety   Speech normal rate, normal volume, normal pitch   Mood anxious, less depressed   Affect constricted, tearful   Thought Processes organized, goal directed   Associations intact associations   Thought Content no overt delusions   Perceptual Disturbances: no auditory hallucinations, no visual hallucinations   Abnormal Thoughts  Risk Potential Suicidal ideation - None  Homicidal ideation - None  Potential for aggression - No   Orientation oriented to person, place, time/date and situation Memory recent and remote memory grossly intact   Consciousness alert and awake   Attention Span Concentration Span attention span and concentration appear shorter than expected for age   Intellect appears to be of average intelligence   Insight intact   Judgement intact   Muscle Strength and  Gait muscle strength and tone were normal, normal gait , normal balance   Motor activity no abnormal movements   Language no difficulty naming common objects, no difficulty repeating a phrase, no difficulty writing a sentence   Fund of Knowledge adequate knowledge of current events  adequate fund of knowledge regarding past history  adequate fund of knowledge regarding vocabulary    Pain none   Pain Scale 0       Laboratory Results: I have personally reviewed all pertinent laboratory/tests results  Recent Labs (last 2 months):    Admission on 09/26/2018, Discharged on 10/01/2018   Component Date Value    EXTBreath Alcohol 09/26/2018 0 000     Amph/Meth UR 09/26/2018 Negative     Barbiturate Ur 09/26/2018 Negative     Benzodiazepine Urine 09/26/2018 Negative     Cocaine Urine 09/26/2018 Negative     Methadone Urine 09/26/2018 Negative     Opiate Urine 09/26/2018 Negative     PCP Ur 09/26/2018 Negative     THC Urine 09/26/2018 Negative     EXT PREG TEST UR (Ref: N* 09/26/2018 negative     Cholesterol 09/27/2018 202*    Triglycerides 09/27/2018 58     HDL, Direct 09/27/2018 44     LDL Calculated 09/27/2018 146*    Non-HDL-Chol (CHOL-HDL) 09/27/2018 158     Sodium 09/27/2018 138     Potassium 09/27/2018 4 2     Chloride 09/27/2018 104     CO2 09/27/2018 26     ANION GAP 09/27/2018 8     BUN 09/27/2018 9     Creatinine 09/27/2018 0 87     Glucose 09/27/2018 97     Glucose, Fasting 09/27/2018 97     Calcium 09/27/2018 8 9     AST 09/27/2018 19     ALT 09/27/2018 40     Alkaline Phosphatase 09/27/2018 103     Total Protein 09/27/2018 7 4     Albumin 09/27/2018 3 3*    Total Bilirubin 09/27/2018 0 33  eGFR 09/27/2018 84     WBC 09/27/2018 7 75     RBC 09/27/2018 4 34     Hemoglobin 09/27/2018 12 2     Hematocrit 09/27/2018 37 4     MCV 09/27/2018 86     MCH 09/27/2018 28 1     MCHC 09/27/2018 32 6     RDW 09/27/2018 13 1     MPV 09/27/2018 10 1     Platelets 46/78/1399 326     nRBC 09/27/2018 0     Neutrophils Relative 09/27/2018 70     Immat GRANS % 09/27/2018 0     Lymphocytes Relative 09/27/2018 23     Monocytes Relative 09/27/2018 6     Eosinophils Relative 09/27/2018 1     Basophils Relative 09/27/2018 0     Neutrophils Absolute 09/27/2018 5 30     Immature Grans Absolute 09/27/2018 0 03     Lymphocytes Absolute 09/27/2018 1 79     Monocytes Absolute 09/27/2018 0 49     Eosinophils Absolute 09/27/2018 0 11     Basophils Absolute 09/27/2018 0 03     Preg, Serum 09/27/2018 Negative     RPR 09/27/2018 Non-Reactive     TSH 3RD GENERATON 09/27/2018 1 180     Carbamazepine Lvl 09/29/2018 3 0*    WBC 09/29/2018 6 77     RBC 09/29/2018 4 12     Hemoglobin 09/29/2018 11 7     Hematocrit 09/29/2018 35 5     MCV 09/29/2018 86     MCH 09/29/2018 28 4     MCHC 09/29/2018 33 0     RDW 09/29/2018 13 0     MPV 09/29/2018 10 2     Platelets 63/02/6443 307     nRBC 09/29/2018 0     Neutrophils Relative 09/29/2018 62     Immat GRANS % 09/29/2018 0     Lymphocytes Relative 09/29/2018 28     Monocytes Relative 09/29/2018 8     Eosinophils Relative 09/29/2018 2     Basophils Relative 09/29/2018 0     Neutrophils Absolute 09/29/2018 4 14     Immature Grans Absolute 09/29/2018 0 02     Lymphocytes Absolute 09/29/2018 1 92     Monocytes Absolute 09/29/2018 0 51     Eosinophils Absolute 09/29/2018 0 15     Basophils Absolute 09/29/2018 0 03     Sodium 09/29/2018 138     Potassium 09/29/2018 4 1     Chloride 09/29/2018 105     CO2 09/29/2018 27     ANION GAP 09/29/2018 6     BUN 09/29/2018 11     Creatinine 09/29/2018 0 90     Glucose 09/29/2018 97     Glucose, Fasting 09/29/2018 97     Calcium 09/29/2018 8 6     AST 09/29/2018 13     ALT 09/29/2018 32     Alkaline Phosphatase 09/29/2018 97     Total Protein 09/29/2018 7 1     Albumin 09/29/2018 3 2*    Total Bilirubin 09/29/2018 0 24     eGFR 09/29/2018 80     Carbamazepine Lvl 10/01/2018 6 7        Assessment/Plan:       Diagnoses and all orders for this visit:    Bipolar I disorder, most recent episode depressed, severe without psychotic features (HCC)  -     ARIPiprazole (ABILIFY) 15 mg tablet; Take 1 tablet (15 mg total) by mouth daily at bedtime for 90 days  -     carBAMazepine (TEGretol) 200 mg tablet; Take 1 tablet (200 mg total) by mouth 2 (two) times a day for 90 days  -     lamoTRIgine (LaMICtal) 150 MG tablet; Take 2 tablets (300 mg total) by mouth daily at bedtime for 90 days  -     Carbamazepine level, total; Future  -     CBC and differential; Future  -     Comprehensive metabolic panel; Future    SANA (generalized anxiety disorder)  -     clonazePAM (KlonoPIN) 0 5 mg tablet; Take 1 tablet (0 5 mg total) by mouth 3 (three) times a day for 90 days    Extrapyramidal reaction  -     benztropine (COGENTIN) 0 5 mg tablet; Take 1 tablet (0 5 mg total) by mouth 2 (two) times a day as needed for tremors for up to 90 days    Other insomnia  -     traZODone (DESYREL) 50 mg tablet; Take 1 tablet (50 mg total) by mouth daily at bedtime as needed for sleep for up to 90 days    ADHD, predominantly inattentive type  -     atoMOXetine (STRATTERA) 40 mg capsule; Take 1 capsule (40 mg total) by mouth daily for 90 days    Therapeutic drug monitoring  -     Carbamazepine level, total; Future  -     CBC and differential; Future  -     Comprehensive metabolic panel;  Future        Treatment Recommendations/Precautions:    Continue Lamictal 300 mg at bedtime to help with mood stabilization  Continue Tegretol 200 mg bid also to help with mood  Continue Abilify 15 mg at bedtime to help with mood  Continue Strattera 40 mg daily to improve attention and concentration  Discontinue Ativan - not helping  Start Klonopin 0 5 mg tid to improve anxiety symptoms  Continue Trazodone 50 mg at bedtime as needed to help with insomnia  Medication management every 2 months - she will continue with Partial Program for the next 10 days  Continue psychotherapy with [facility] therapist Flori Contreras  Follows with family physician for glucose and lipid monitoring due to current therapy with antipsychotic medication  Check Tegretol level, CBC and CMP in 1 week    Risks/Benefits      Risks, Benefits And Possible Side Effects Of Medications:    Risks, benefits, and possible side effects of medications explained to Cara Ernst including risk of rash related to treatment with Lamictal, risk of liver impairment and agranulocytosis related to treatment with Tegretol, risk of parkinsonian symptoms, Tardive Dyskinesia and metabolic syndrome related to treatment with antipsychotic medications and risks of dependence, sedation and respiratory depression related to treatment with benzodiazepine medications  She verbalizes understanding and agreement for treatment  Risks of medications in pregnancy explained to Cara Ernst  She verbalizes understanding and agrees to notify her doctor if she becomes pregnant  Controlled Medication Discussion:     Santiagolilian Ernst has been filling controlled prescriptions on time as prescribed according to [facility]    Discussed with Cara Ernst the risks of sedation, respiratory depression, impairment of ability to drive and potential for abuse and addiction related to treatment with benzodiazepine medications  She understands risk of treatment with benzodiazepine medications, agrees to not drive if feels impaired and agrees to take medications as prescribed  Psychotherapy Provided:     Individual psychotherapy provided: Yes  Counseling was provided during the session today for 20 minutes    Medications, treatment progress and treatment plan reviewed with Analisa Payton  Medication changes discussed with Analisa Payton  Goals discussed during in session: alleviate anxiety and maintain mood stability  Discussed with Analisa Payton coping with having mental illness and need for ongoing mental health treatment  Coping strategies including acquiring relapse prevention skills, reducing time spent worrying and relaxation reviewed with Analisa Payton  Reassurance and supportive therapy provided        Treatment Plan;    Completed and signed during the session: Not applicable - Treatment Plan to be completed by [facility] therapist    Mendel Chase, MD 10/05/18

## 2018-10-04 NOTE — PSYCH
Subjective:     Patient ID: Jose Nielsen is a 38 y o  female  [facility] Clinical Progress Notes      Specialized Services Documentation  Therapist must complete separate progress note for each specific clinical activity in which the individual participated during the day  Group Psychotherapy [id number]  Mario Lund participated in [facility] group focused on identifying personal triggers  Handout was shared to note all triggers encountered in one day and to assess whether they are internal or external triggers and how they were handled  Mario Lund actively shared that she is triggered by several things; returning to inpatient unit is a trigger for her, as well as thinking about a former  boyfriend  Mario Lund stated that she would meet up with friends every year to celebrate this friend's birthday but now she has stopped doing so as it "just makes me sad and my  said not to do it if it is not helpful " Mario Lund identified strategies that are helpful for her when triggered and discussed that sometimes strategies that were helpful and healthy in the past no longer are appropriate and triggers need to be handled in other ways or they may no longer trigger you  Mario Lund offered encouragement and support to peers and made moderate progress toward goal  Continue group to educate and provide practice on identifying and handling triggers to self-defeating behaviors in a healthier way  Tx Plan Objective: 1 1,1 2,1 4    Therapist:  Carmen Ocampo RN                    Case Management Note  [id number]  Mario Lund met with this CM to discuss progress in Program and discharge plan  Mario Lund stated that she is not having any thoughts of self-harm including no thoughts of suicide or homicide  She denied any side effects from her medications   Mario Lund discussed having difficulty with  herself from other's feelings and that hearing others speak about losses and strong emotions is challenging and "stressful" for her  She stated that she has begun to focus on developing healthier coping strategies, as included in treatment plan  This CM/RN encouraged Bashir to begin to look at her WRAP and complete "Triggers" page 8 in [facility]  Follow up appointments have been made with Margi's OP providers at [address] [place] [address] as per her request   OP appointment was made with Dr Kimi Chambers for November 9, 2018 at 3:40PM  (COLT signed )   Latonia Lombardi RN    Current suicide risk : Low     Medications changes/added/denied? No    Treatment session number: 3    Individual Case Management Visit provided today?  Yes     [facility] follow up physician's orders: N/A

## 2018-10-04 NOTE — PSYCH
Subjective:     Patient ID: Shola Robins is a 38 y o  female  [facility] Clinical Progress Notes      Specialized Services Documentation  Therapist must complete separate progress note for each specific clinical activity in which the individual participated during the day  Group Psychotherapy  [phone]) Dimple Pablo participated in psychotherapy group focused on grief/death  Dimple Pablo identified being unsure if she should continue with school or withdraw right now as a stressor today  She actively engaged in group discussion, acknowledging peers that talked about their grief over the loss of loved ones by nodding her head and providing significant support and feedback  She shared that her   six years ago, and she still feels angry and overcome with grief on a daily basis, but encouraged peers that with time it gets easier to learn how to live their "new normal "  Some moderate progress toward goals  Continue psychotherapy group to encourage Dimple Pablo to explore stressors and healthier ways of coping     Tx Plan Objective: 1 1, 1 2, Therapist:  Wong BARBOSA

## 2018-10-05 ENCOUNTER — OFFICE VISIT (OUTPATIENT)
Dept: PSYCHIATRY | Facility: CLINIC | Age: 43
End: 2018-10-05
Payer: MEDICARE

## 2018-10-05 ENCOUNTER — DOCUMENTATION (OUTPATIENT)
Dept: PSYCHOLOGY | Facility: CLINIC | Age: 43
End: 2018-10-05

## 2018-10-05 VITALS
DIASTOLIC BLOOD PRESSURE: 86 MMHG | HEIGHT: 62 IN | HEART RATE: 96 BPM | WEIGHT: 249 LBS | SYSTOLIC BLOOD PRESSURE: 133 MMHG | BODY MASS INDEX: 45.82 KG/M2

## 2018-10-05 DIAGNOSIS — F90.0 ADHD, PREDOMINANTLY INATTENTIVE TYPE: Chronic | ICD-10-CM

## 2018-10-05 DIAGNOSIS — F41.1 GAD (GENERALIZED ANXIETY DISORDER): Chronic | ICD-10-CM

## 2018-10-05 DIAGNOSIS — G47.09 OTHER INSOMNIA: Chronic | ICD-10-CM

## 2018-10-05 DIAGNOSIS — F31.4 BIPOLAR I DISORDER, MOST RECENT EPISODE DEPRESSED, SEVERE WITHOUT PSYCHOTIC FEATURES (HCC): Primary | Chronic | ICD-10-CM

## 2018-10-05 DIAGNOSIS — G25.9 EXTRAPYRAMIDAL REACTION: ICD-10-CM

## 2018-10-05 DIAGNOSIS — Z51.81 THERAPEUTIC DRUG MONITORING: Chronic | ICD-10-CM

## 2018-10-05 PROCEDURE — 99213 OFFICE O/P EST LOW 20 MIN: CPT | Performed by: PSYCHIATRY & NEUROLOGY

## 2018-10-05 PROCEDURE — 90833 PSYTX W PT W E/M 30 MIN: CPT | Performed by: PSYCHIATRY & NEUROLOGY

## 2018-10-05 RX ORDER — CLONAZEPAM 0.5 MG/1
0.5 TABLET ORAL 3 TIMES DAILY
Qty: 90 TABLET | Refills: 2 | Status: SHIPPED | OUTPATIENT
Start: 2018-10-05 | End: 2018-12-24 | Stop reason: SDUPTHER

## 2018-10-05 RX ORDER — BENZTROPINE MESYLATE 0.5 MG/1
0.5 TABLET ORAL 2 TIMES DAILY PRN
Qty: 60 TABLET | Refills: 2 | Status: SHIPPED | OUTPATIENT
Start: 2018-10-05 | End: 2019-02-07 | Stop reason: SDDI

## 2018-10-05 RX ORDER — ATOMOXETINE 40 MG/1
40 CAPSULE ORAL DAILY
Qty: 30 CAPSULE | Refills: 2 | Status: SHIPPED | OUTPATIENT
Start: 2018-10-05 | End: 2018-12-24 | Stop reason: SDUPTHER

## 2018-10-05 RX ORDER — ARIPIPRAZOLE 15 MG/1
15 TABLET ORAL
Qty: 30 TABLET | Refills: 2 | Status: SHIPPED | OUTPATIENT
Start: 2018-10-05 | End: 2018-12-24 | Stop reason: SDUPTHER

## 2018-10-05 RX ORDER — CARBAMAZEPINE 200 MG/1
200 TABLET ORAL 2 TIMES DAILY
Qty: 60 TABLET | Refills: 2 | Status: SHIPPED | OUTPATIENT
Start: 2018-10-05 | End: 2018-12-24 | Stop reason: SDUPTHER

## 2018-10-05 RX ORDER — TRAZODONE HYDROCHLORIDE 50 MG/1
50 TABLET ORAL
Qty: 30 TABLET | Refills: 2 | Status: SHIPPED | OUTPATIENT
Start: 2018-10-05 | End: 2019-02-07 | Stop reason: ALTCHOICE

## 2018-10-05 RX ORDER — LAMOTRIGINE 150 MG/1
300 TABLET ORAL
Qty: 60 TABLET | Refills: 2 | Status: SHIPPED | OUTPATIENT
Start: 2018-10-05 | End: 2018-12-24 | Stop reason: SDUPTHER

## 2018-10-05 NOTE — PROGRESS NOTES
0900 Lizyerin Huang is excused from Program today due to OP psychiatrist appointment   E ZEKE Navarro

## 2018-10-08 ENCOUNTER — OFFICE VISIT (OUTPATIENT)
Dept: PSYCHOLOGY | Facility: CLINIC | Age: 43
End: 2018-10-08
Payer: MEDICARE

## 2018-10-08 VITALS
SYSTOLIC BLOOD PRESSURE: 122 MMHG | DIASTOLIC BLOOD PRESSURE: 80 MMHG | RESPIRATION RATE: 20 BRPM | TEMPERATURE: 98.5 F | HEART RATE: 85 BPM

## 2018-10-08 DIAGNOSIS — F31.4 SEVERE DEPRESSED BIPOLAR I DISORDER WITHOUT PSYCHOTIC FEATURES (HCC): Primary | ICD-10-CM

## 2018-10-08 PROCEDURE — G0177 OPPS/PHP; TRAIN & EDUC SERV: HCPCS

## 2018-10-08 PROCEDURE — G0176 OPPS/PHP;ACTIVITY THERAPY: HCPCS

## 2018-10-08 PROCEDURE — G0410 GRP PSYCH PARTIAL HOSP 45-50: HCPCS

## 2018-10-08 NOTE — PSYCH
Subjective:     Patient ID: Kandi Domingo is a 38 y o  female  [facility] Clinical Progress Notes      Specialized Services Documentation  Therapist must complete separate progress note for each specific clinical activity in which the individual participated during the day  Group Psychotherapy [id number]  Maria Del Rosario Martin participated in wellness group focused on using healthy assertiveness skills to take care of oneself, including the use of deciding on and enacting healthy boundaries for everyday wellness, in work and in personal life  Maria Del Rosario Salazar shared actively in education, handout and peer discussion of challenging situations and individuals to set boundaries with  Maria Del Rosario Salazar shared that it is difficult to set boundaries with her son who is not doing well in school and wants to be in school play  She stated that she knows he needs consequences and she had set the boundary that if he did not bring up the grades from two D's to A's or B's he cannot be in play  Lakshmi Peralta stated that boundaries should be adhered to and not be loose  Discussed maintaining good, solid boundaries but realizing when exceptions can, and maybe should be made  Maria Del Rosario Salazar made good progress toward goals  Continue to offer group to offer both education and practice in developing wellness strategies that are neither passive nor aggressive but assertive for self-care and recovery  Tx Plan Objective: 1 1,1 2,1 4    Therapist:  Patt Mayorga RN     CASE MANAGEMENT:  [id number]  Maria Del Rosario Martin met with this CM/RN to discuss progress in Program  She stated that she often feels that she is behind other peers in group because she does not "know what to work on " Discussed treatment goals and Maria Del Rosario Salazar stated that she wants to work on "body image " She stated that she feels very low self-esteem due to great amount of weight she has gained   Maria Del Rosario Salazar stated that she she will break goal down to smaller steps and she will make one modification to her diet; stop eating ice cream every night after dinner  Christine Peters stated that she can substitute fruit  Christniehiram Peters denied SI and HI  She stated that her OP psychiatrist, Dr Elvis Skinner stopped Ativan and switched her to Klonopin instead due to her increased anxiety  Christine Peters denied any side effects from her medications     Efe Parr, ZEKE      Treatment Day:  4    Case Managements Session Today:  YES    Suicidal Risk:  LOW

## 2018-10-08 NOTE — PSYCH
Subjective:     Patient ID: Nimisha Breaux is a 38 y o  female  [facility] Clinical Progress Notes      Specialized Services Documentation  Therapist must complete separate progress note for each specific clinical activity in which the individual participated during the day  Group Psychotherapy  ([id number]) Group opened with introductions, mindfulness thought of the day, and identifying one stressor  Eliseo Spain shared that dealing with others is an ongoing stressor for her  She offered appropriate feedback on how to deal with being in an argument that causes a person to "just freeze" then triggers suicidal thoughts  Her mood ranged from bright to tearful as she offered support to peers and talked about her own concerns  Eliseo Spain spoke about how her  is so supportive that at times she becomes frustrated with him for it, wanting him to be more firm with her  Eliseo Spain will continue to develop coping skills by exploring and practicing helpful techniques in managing her mood     Tx Plan Objective: 1 1, 1 2, 1 3, 1 4 Therapist:  Yovanny Horton MA Intern Jim BARBOSA

## 2018-10-08 NOTE — PSYCH
Subjective:     Patient ID: Holli Lehman is a 38 y o  female  [facility] Clinical Progress Notes      Specialized Services Documentation  Therapist must complete separate progress note for each specific clinical activity in which the individual participated during the day       Education Therapy   Time:  6838-9759  Previous goal met: Yes   Readiness to Learning: Other Presents as contrary  Barriers to Learning: None  Learning Assessment  Time: 4713-0195  Education Completed: Illness and Wellness Tools  Teaching Method: Verbal and Demonstration  Shared Area of Learning: Yes   Goal Set: Catch up on homework for 2 hours  Tx Plan Objective: 1 2, Therapist: Ana Braga MTI,  Chin Pan MT-BC

## 2018-10-08 NOTE — PSYCH
Subjective:     Patient ID: Laina Valle is a 38 y o  female  [facility] Clinical Progress Notes      Specialized Services Documentation  Therapist must complete separate progress note for each specific clinical activity in which the individual participated during the day  Allied Therapy   [id number] Bashir actively shared in [facility] group focused on relaxation techniques and mindfulness strategies  She appeared engaged in PMR and breathing techniques and task  Group explored practice of what skills in DBT mindfulness through observing, describing and participating in a janeth listening and then engaging in song  She remains contrary and challenging to presented materials at times  Challenged her to view material differently and gave her options during PMR (since she said it made her worse last week - but she did not take the option)  Inconsistent effort noted toward treatment goal   Continue AT to encourage the development and practice of mindfulness strategies to alleviate symptoms and support wellness      Tx Plan Objective: 1 2, Therapist:  Maria Guadalupe Gomez MT-BC

## 2018-10-09 ENCOUNTER — OFFICE VISIT (OUTPATIENT)
Dept: PSYCHOLOGY | Facility: CLINIC | Age: 43
End: 2018-10-09
Payer: MEDICARE

## 2018-10-09 DIAGNOSIS — F31.4 SEVERE DEPRESSED BIPOLAR I DISORDER WITHOUT PSYCHOTIC FEATURES (HCC): Primary | ICD-10-CM

## 2018-10-09 PROCEDURE — G0177 OPPS/PHP; TRAIN & EDUC SERV: HCPCS

## 2018-10-09 PROCEDURE — G0410 GRP PSYCH PARTIAL HOSP 45-50: HCPCS

## 2018-10-09 PROCEDURE — G0176 OPPS/PHP;ACTIVITY THERAPY: HCPCS

## 2018-10-09 NOTE — PSYCH
Subjective:     Patient ID: Mike Gan is a 38 y o  female  [facility] Clinical Progress Notes      Specialized Services Documentation  Therapist must complete separate progress note for each specific clinical activity in which the individual participated during the day  Group Psychotherapy  [id number]  Smita Tinoco participated in [facility] group focusing on discussion of relapse prevention planning and identifying triggers for relapse  Handouts with self-assessment questions directed at gaining awareness of triggers and relapse prevention strategies was used  Examples are;  the ability to name current medications and what they were used for, as well as triggers for relapse, symptoms of their illness and how to cope safely with triggers and symptoms as well as relapse  Smita Tinoco shared that signs and symptoms of her manic symptoms increasing was increased promiscuity  She also has difficulty sleeping also  Smita Tinoco discussed ways to address her increase in sophia that would decrease negative behaviors of acting out including listening to her support who reflects changes seen in her behaviors  Smita Tinoco made moderate progress toward goals  Continue group to offer education on medications and diagnoses as well as areas of knowledge and treatment that require additional attention  TX Plan Objective:  1 1,1 2,1 4   Therapist:  aLtonia Lombardi RN                      Case Management Note    Latonia Lombardi RN    Current suicide risk : Low     Medications changes/added/denied? No    Treatment session number: 5    Individual Case Management Visit provided today?  No    [facility] follow up physician's orders: N/A

## 2018-10-09 NOTE — PSYCH
Subjective:     Patient ID: Shay Whitmore is a 38 y o  female  [facility] Clinical Progress Notes      Specialized Services Documentation  Therapist must complete separate progress note for each specific clinical activity in which the individual participated during the day  Allied Therapy   [id number] Lizy Huang actively shared in [facility] group focused on boundary setting  She engaged in improvisation and discussion exploring value of and ways to set healthy limits with self and others  She shared actively offered feedback  She continues to challenge statements and play "devil's advocate" in group  Participated in practicing boundaries with given scenarios and DBT GIVE skill reviewed  Some work toward goal noted  Continue AT to encourage skill development and use     Tx Plan Objective: 1 1, Therapist:  Rodney TROY

## 2018-10-09 NOTE — PSYCH
Subjective:     Patient ID: Jarocho Newman is a 38 y o  female  [facility] Clinical Progress Notes      Specialized Services Documentation  Therapist must complete separate progress note for each specific clinical activity in which the individual participated during the day  Group Psychotherapy   ([id number]) Litzy Lizama participated in psychotherapy group focused on communicating with supports and setting healthy boundaries  Litzy Lizama identified finding it difficult to keep up with school work as a stressor  She actively engaged in group discussion, relating to peers that talked about struggling to reach out to supports for fear of burdening them or overwhelming them  She said that she can share with her , but always worries that he will get tired of her irritability and depression  Group discussed their tendency to worry about others' feelings more than their own, and the importance of being able to share honestly with a network of supports in order to maintain emotional wellness  Litzy Lizama encouraged her peers to communicate honestly and openly with supports, and shared that she and her first  had an argument that ended badly, and he  at work the next day  Litzy Lizaam said that she struggles to this day with not having the chance to make things better between them before he   Group also discussed the necessity of setting healthy boundaries with people in their lives, and encouraged each other to separate themselves from people that do not respect their boundaries  Moderate progress noted toward goals  Continue psychotherapy group to encourage Litzy Lizama to explore stressors and coping    Tx Plan Objective: 1 1, 1 2, 1 4, Therapist:  Melo BARBOSA

## 2018-10-09 NOTE — PSYCH
Subjective:     Patient ID: Luís Pantoja is a 38 y o  female  [facility] Clinical Progress Notes      Specialized Services Documentation  Therapist must complete separate progress note for each specific clinical activity in which the individual participated during the day       Education Therapy   Time:  8569-8298  Previous goal met: No  Readiness to Learning: Other Presents as contrary  Barriers to Learning: None  Learning Assessment  Time: 6720-1482  Education Completed: Illness and Wellness Tools  Teaching Method: Verbal and Demonstration  Shared Area of Learning: Yes   Goal Set: Write 3 pages of a paper  Tx Plan Objective: 1 1, 1 2, Therapist: Dagoberto Fulton MTI, Blayne Hinton [facility]

## 2018-10-10 ENCOUNTER — OFFICE VISIT (OUTPATIENT)
Dept: PSYCHOLOGY | Facility: CLINIC | Age: 43
End: 2018-10-10
Payer: MEDICARE

## 2018-10-10 DIAGNOSIS — F31.4 SEVERE DEPRESSED BIPOLAR I DISORDER WITHOUT PSYCHOTIC FEATURES (HCC): Primary | ICD-10-CM

## 2018-10-10 PROCEDURE — G0176 OPPS/PHP;ACTIVITY THERAPY: HCPCS

## 2018-10-10 PROCEDURE — G0410 GRP PSYCH PARTIAL HOSP 45-50: HCPCS

## 2018-10-10 PROCEDURE — G0177 OPPS/PHP; TRAIN & EDUC SERV: HCPCS

## 2018-10-10 NOTE — PSYCH
Subjective:     Patient ID: David Ramírez is a 38 y o  female  [facility] Clinical Progress Notes      Specialized Services Documentation  Therapist must complete separate progress note for each specific clinical activity in which the individual participated during the day  Group Psychotherapy   ([id number]) Hector Barber participated in psychotherapy group focused on reaching out for support when needed  Hector Barber identified worrying about school and work as a stressor today  She actively engaged in group discussion, relating to peers that talked about struggling to reach out for support when needed  She shared that she has always been the type to try to present as strong and capable, and feels shame if she needs to reach out for help and cannot handle all of her responsibilities  Group discussed the impact that trying to manage stressors alone can have on mental health, and the importance of reaching out to support system  Some moderate progress noted toward goals  Continue psychotherapy group to encourage Hector Barber to explore stressors and coping    Tx Plan Objective: 1 1, 1 2, 1 4, Therapist:  Aidan BARBOSA

## 2018-10-10 NOTE — PSYCH
Subjective:     Patient ID: Neha Krause is a 38 y o  female  [facility] Clinical Progress Notes      Specialized Services Documentation  Therapist must complete separate progress note for each specific clinical activity in which the individual participated during the day  Allied Therapy  [id number]  Neha Krause moderately shared in [facility] group focused on decreasing self- stigma and supports  She attentively listened to [facility] share his life story  Group encouraged power of learning about self, accepting illness and personal responsibility in recovery  Community resources reviewed in addition to personal resources like self talk/mantras  Kyara Degroot engaged in self-talk and mantra repeating exercise  Some progress toward goal noted  Continue AT to encourage self -awareness and healthy engagement of supports     Tx Plan Objective: 1 1, 1 2, Therapist: Matilde Matias    Education Therapy   Time:  4111-1523  Previous goal met: Yes   Readiness to Learning: Other Presents as contrary  Barriers to Learning: None  Learning Assessment  Time: 2410-2801  Education Completed: Illness and Wellness Tools  Teaching Method: Verbal and Demonstration  Shared Area of Learning: Yes   Goal Set: Talk to spouse  Tx Plan Objective: 1 1, 1 2, Therapist: Matilde Matias

## 2018-10-10 NOTE — PSYCH
Subjective:     Patient ID: Linda Nuñez is a 38 y o  female  [facility] Clinical Progress Notes      Case Management Note  [id number]  Layo Chaudhari met with this CM to discuss progress in Program and discharge planning  Layo Chaudhari stated that she feels she is "just getting it" meaning that she is learning how to use [facility] Program to be more well and to stabilize mood further  She stated that when she arrived she was very angry because she did "not want to be here but I came because my psychiatrist said I needed to be here " Layo Chaudhari stated that she is now feeling that she can profit from [employer] and she no longer has to "fight being here " Layo Watson stated that she does not want to relapse and that she wants to learn what she can do to stabilize herself and prevent relapse  She spoke about decreasing number of classes she is taking in college for her masters degree program so she is not "stressed out" and does not do well in classes  Layo Chaudhari stated that she if feeling she can open up and speak in group and feels safe doing so  She denied SI and HI as well as any SE from medications  Glorine Gaucher RN    Current suicide risk : Low     Medications changes/added/denied? No    Treatment session number: 6    Individual Case Management Visit provided today?  Yes     [facility] follow up physician's orders: N/A

## 2018-10-10 NOTE — PSYCH
Subjective:     Patient ID: Luís Pantoja is a 38 y o  female  [facility] Clinical Progress Notes      Specialized Services Documentation  Therapist must complete separate progress note for each specific clinical activity in which the individual participated during the day  Group Psychotherapy [id number]    Katiemary Lantigua participated in [facility] group focused on educating your supports about your mental illness including potential for relapse because relapse also affects supports   Nat shared that her  and her best friend are great support for her and that she will be utilizing them in the future if needed  Katie Rhina made  moderate  progress toward goals  Continue to offer group to educate supports on  individual symptoms of relapse and ways supports can assist to prevent relapse         Tx Plan Objective: 1 1,1 2,1 4 Therapist: Kenya Castro

## 2018-10-11 ENCOUNTER — OFFICE VISIT (OUTPATIENT)
Dept: PSYCHOLOGY | Facility: CLINIC | Age: 43
End: 2018-10-11
Payer: MEDICARE

## 2018-10-11 DIAGNOSIS — F31.4 SEVERE DEPRESSED BIPOLAR I DISORDER WITHOUT PSYCHOTIC FEATURES (HCC): Primary | ICD-10-CM

## 2018-10-11 PROCEDURE — G0410 GRP PSYCH PARTIAL HOSP 45-50: HCPCS

## 2018-10-11 PROCEDURE — G0177 OPPS/PHP; TRAIN & EDUC SERV: HCPCS

## 2018-10-11 PROCEDURE — G0176 OPPS/PHP;ACTIVITY THERAPY: HCPCS

## 2018-10-11 NOTE — PSYCH
Subjective:     Patient ID: Holli Lehman is a 38 y o  female  [facility] Clinical Progress Notes      Specialized Services Documentation  Therapist must complete separate progress note for each specific clinical activity in which the individual participated during the day  Group Psychotherapy  ([id number]) Polo Barreto participated in psychotherapy group focused on recognizing the need to reach out for help before symptoms become too severe  Polo Barreto identified letting emotions out more and more as a stressor  She actively engaged in group discussion, relating to peers that talked about hitting a low point before realizing that their symptoms were severe, and needing to learn their warning signs in order to reach out for help before "crashing" again  Cherie Leiva stated that she does not tend to recognize that she is declining or that her symptoms are abnormal (like suicidal thoughts) until she is able to stop and think about things once she has hit a low point  Group discussed ways to identify warning signs, the importance of sharing those signs with supports, and seeking help from supports and/or professionals to prevent further decline  Polo Barreto seemed to have little insight into how to intervene early and utilize supports, but did seem somewhat receptive to peer suggestions by the end of the hour  Some slow progress noted toward goals  Continue psychotherapy group to encourage Polo Barreto to explore stressors and coping    Tx Plan Objective: 1 1, 1 2, Therapist:  Enrico BARBOSA

## 2018-10-11 NOTE — PSYCH
Subjective:     Patient ID: Pancho Moss is a 38 y o  female  [facility] Clinical Progress Notes      Specialized Services Documentation  Therapist must complete separate progress note for each specific clinical activity in which the individual participated during the day  Case Management Note    Dane Zaidi RN    Current suicide risk : Low     Medications changes/added/denied? No    Treatment session number: 7    Individual Case Management Visit provided today?  No    [facility] follow up physician's orders: N/A

## 2018-10-11 NOTE — PSYCH
Psych        Diagnoses and all orders for this visit:      SANA (generalized anxiety disorder)     Bipolar I disorder, most recent episode depressed, severe without psychotic features ([facility] [address] )     ADHD, predominantly inattentive type     Subjective:     Patient ID: Codi Bustos is a 38year old female     [facility] Treatment Plan   AREAS OF NEED:  Depression and anxiety as manifested by recent inpatient [facility] hospitalization due to recent intensive and intrusive suicidal ideation with plan, feeling overwhelmed, tearful, anxious, poor concentration, feeling hopeless, helplessness, 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, poor appetite, weight gain, hypersomnia, mood swings and anxiety attacks  Date Initiated: 10/2/18     Strengths:  Some insight into need for treatment and willingness to participate in [facility]      LONG TERM GOAL:   Date Initiated: 10/2/18  1 0 I will identify three signs that my mood is more controlled and I am using healthy coping skills to handle my stress, anxiety and depression  Target Date: 10/30/18  Revision Date:  Completion Date:      SHORT TERM OBJECTIVES:    Date Initiated: 10/2/18  1 1   I will identify and practice daily three healthy ways to refocus myself when I am feeling overwhelmed, anxious or depressed  Revision Date: 10/11/18  Target Date: 10/11/18  Completion Date:      Date Initiated: 10/2/18  1 2 I will write out three healthy coping skills learned in [facility] to reduce my symptoms of anxiety and I will practice at least one of them daily  Revision Date: 10/11/18  Target Date: 10/11/18  Completion Date:     Date Initiated:  10/2/18  1 3 I will take medications as prescribed and share any questions or concerns when, or if, they arise    Revision Date: 10/11/18  Target Date: 10/11/18  Completion Date:      Date Initiated:  10/2/18  1 4 I will name three supports and I will identify specific ways my supports can assist me in my recovery  Revision Date: 10/11/18  Target Date: 10/11/18  Completion Date:       Treatment Plan Update:  Date Initiated:  10/11/18  1 1,1 2,1 3,1 4  Continue to work on these goals as they remain relevant but unmet  Target Date:  10/23/18  Completion Date:       PSYCHIATRY:  Medication Management and education  Date Initiated: 10/2/18  Revision Date: 10/11/18  The person(s) responsible for carrying out the plan is Kamaljit Luciano MD     NURSIN 1,1 2,1 3,1 4 This RN will provide daily wellness group five days weekly to educate Conner Marshall on individual diagnoses and medications used in treatment  Date Initiated: 10/2/18  Revision Date: 10/11/18  The person(s) responsible for carrying out the plan is Dionicio Pickering RN     PSYCHOLOGY:   1 1,1 2,1 3,1 4 Provide psychotherapy group five times weekly to allow opportunity for Conner Marshall to explore stressors and ways of coping  Date Initiated: 10/2/18  Revision Date:  10/11/18  The person(s) responsible for carrying out the plan is FAMILIA Elizabeth,LSW     ALLIED THERAPY:   1 1,1 2  Engage Conner Rolando in AT group five times weekly to encourage development and use of wellness tools to decrease symptoms and promote recovery through meaningful activity  Date Initiated: 10/2/18  Revision Date: 10/11/18  The person(s) responsible for carrying out the plan is WOODROW Foster      CASE MANAGEMENT:  Date Initiated: 10/2/18  Revision Date: 10/11/18  1 0 This  will meet with Conner Marshall three to four times weekly to assess treatment progress, D/C planning and connection to community supports and UR as indicated     The person(s) responsible for carrying out the plan is Dionicio Pickering RN     DISCHARGE CRITERIA: Identify 3 signs of progress and complete relapse prevention plan      DISCHARGE PLAN: [facility]     Estimated Length of Stay: 10 days     DSM Diagnoses:     SANA (generalized anxiety disorder)     Bipolar I disorder, most recent episode depressed, severe without psychotic features ([address] )     ADHD, predominantly inattentive type            Diagnosis and Treatment Plan explained to Zuleika Rule relates understanding diagnosis and is agreeable to Treatment Plan             CLIENT COMMENTS / Please share your thoughts, feelings, need and/or experiences regarding your treatment plan: _____________________________________________________________________________________________________________________________________________________________________________________________________________________________________________________________________________________________________________________ Date/Time: ______________     Patient Signature: _________________________________     Date/Time: ______________      Signature: _________________________________     Date/Time: ______________

## 2018-10-11 NOTE — PSYCH
Subjective:     Patient ID: Mayda Lees is a 38 y o  female  [facility] Clinical Progress Notes      Specialized Services Documentation  Therapist must complete separate progress note for each specific clinical activity in which the individual participated during the day  Allied Therapy  [id number]  Mayda Lees actively shared in [facility] group exploring DBT distress tolerance crisis survival strategies  Group engaged in a relaxation technique and discussion  Discussion reviewed crisis survival strategies ACCEPTS, STOP, SELF-SOOTHING, IMPROVE and PROS & CONS reinforcing concepts and actions one could take to learn to tolerate stressful experiences, thoughts and urges  Amanda Lobato was observed to participate in relaxation exercise and appeared to be crying  During discussion, strategy examples reviewed how to use actions like taking a step back before reacting  She shared an example of how to use one of the skills in her daily life  Some progress toward goal noted  Continue AT to encourage learning, practice and home practice of skills to manage distress    Tx Plan Objective: 1 1, 1 2, Therapist: Raina Julien    Education Therapy   Time:  1795-4555  Previous goal met: Yes   Readiness to Learning: Receptive  Barriers to Learning: None  Learning Assessment  Time: 1403-2782  Education Completed: Illness and Wellness Tools  Teaching Method: Verbal and Demonstration  Shared Area of Learning: Yes   Goal Set: Work on processing withdrawal from school  Tx Plan Objective: 1 1, 1 2, Therapist: Raina Julien

## 2018-10-11 NOTE — PSYCH
Subjective:     Patient ID: Mike Guadarrama is a 38 y o  female  [facility] Clinical Progress Notes      Specialized Services Documentation  Therapist must complete separate progress note for each specific clinical activity in which the individual participated during the day  Group Psychotherapy [id number]    Bailey Whittington actively participated in psychotherapy group focused on medication compliance,side effects and benefits of her prescribed medications  Dada Duran was able to name her medications and why she was prescribed them  Bailey Whittington did voice that she was feeling " less rosas and more focused" since she had her medication change  Bailey Whittington had moderate progress towards goals  Continue psychotherapy towards goals and treatment       Tx Plan Objective: 1 1,1 2,1 3,1 4 Therapist: Hillary Vences RN

## 2018-10-12 ENCOUNTER — OFFICE VISIT (OUTPATIENT)
Dept: PSYCHOLOGY | Facility: CLINIC | Age: 43
End: 2018-10-12
Payer: MEDICARE

## 2018-10-12 DIAGNOSIS — F31.4 SEVERE DEPRESSED BIPOLAR I DISORDER WITHOUT PSYCHOTIC FEATURES (HCC): Primary | ICD-10-CM

## 2018-10-12 PROCEDURE — G0176 OPPS/PHP;ACTIVITY THERAPY: HCPCS

## 2018-10-12 PROCEDURE — G0410 GRP PSYCH PARTIAL HOSP 45-50: HCPCS

## 2018-10-12 PROCEDURE — G0177 OPPS/PHP; TRAIN & EDUC SERV: HCPCS

## 2018-10-12 NOTE — PSYCH
Subjective:     Patient ID: Rock Avery is a 38 y o  female  [facility] Clinical Progress Notes      Specialized Services Documentation  Therapist must complete separate progress note for each specific clinical activity in which the individual participated during the day  Allied Therapy  [id number]  Rock Avery actively shared in [facility] group focused on managing anger  She engaged in task exploring effective versus ineffective ways in addition to skills to refocus in the moment  She noted that she often has difficulty expressing anger effectively, but was open to brainstorming with the group  Group explored the benefits of emotional control, emotional regulation skill opposite action, and positive choices with intense emotion  She actively participated in janeth analysis exploring song about anger  She also explored effective ways to manage the primary emotion "sadness" that is associated with her anger and noted that the session was helpful to her  Some positive work toward goal noted   Continue AT to explore wellness tool awareness and practice    Tx Plan Objective: 1 1, 1 2, Therapist: Bushra WilburnBC    Education Therapy   Time:  8918-5926  Previous goal met: No  Readiness to Learning: Receptive  Barriers to Learning: None  Learning Assessment  Time: 2885-8970  Education Completed: Illness and Wellness Tools  Teaching Method: Verbal and Demonstration  Shared Area of Learning: Yes   Goal Set: E-mail form and go swimming  Tx Plan Objective: 1 1, 1 2, Therapist: Bushra WilburnBC

## 2018-10-12 NOTE — PSYCH
Subjective:     Patient ID: Jose Nielsen is a 38 y o  female  [facility] Clinical Progress Notes      Specialized Services Documentation  Therapist must complete separate progress note for each specific clinical activity in which the individual participated during the day  Group Psychotherapy  ([id number]) Mario Lund participated in psychotherapy group focused on trying to be strong for too long and "hitting a wall "  Mario Lund identified not feeling well physically and feeling some grief surrounding anniversary of her 's death as a stressor today  She actively engaged in group discussion, relating to peers that talked about trying to be strong and do everything (and believing that they were doing so) for too long, and eventually falling apart mentally because they could no longer handle the stress  Group discussed how society gives messages that one has to be "perfect" and "strong" and to push feelings down, but how this eventually only harms one's emotional and physical health  Mario Lund shared that being in program has begun to help her to recognize that it is ok for her to ask for help and that she cannot possibly do everything without some time to focus on her own needs  Group shared this consensus and discussed ways of letting go of some responsibilities and focusing on self-care  Good progress noted toward goals  Continue psychotherapy group to encourage Mario Lund to explore stressors and coping    Tx Plan Objective: 1 1, 1 2, Therapist:  Harvinder BARBOSA

## 2018-10-12 NOTE — PSYCH
Subjective:     Patient ID: Abdullahi Pugh is a 38 y o  female  [facility] Clinical Progress Notes      Specialized Services Documentation  Therapist must complete separate progress note for each specific clinical activity in which the individual participated during the day  Group Psychotherapy  [id number]  Cara Ernst participated in weekly wellness group to discuss what particular area of wellness  has been worked on up to today in Program and choice of area to continue working on for recovery as targeted in treatment plan, by choice, or in [employer]  Cara Ernst shared that she is working on emotional area because "I can't stop crying all the time " Cara Ernst stated that she feels like she has so much emotion "inside of me" and is afraid to "let it out " Cara Ernst stated that she is afraid if she begins to talk that she will be "put back in the hospital " Cara Ernst stated that she is not feeling suicidal or homicidal but since her OP psychiatrist felt that she needed to go to the hospital recently, Cara Ernst is frightened of returning to hospital  Santiagolilian Ernst was supported in her goal to work on stabilizing herself emotionally by looking at facts rather than her unsubstantiated fears regarding rehospitalization  Cara Ernst also accepted peer feedback for her to get started doing some healthy things to relieve stress of "ruminating " Cara Ernst stated that she loves swimming and used to be a competitive swimmer so she is going to get back to swimming  She made good progress toward goals  Continue to offer weekly wellness group to focus on goals and identify areas of goal achievement and need  Tx Plan Objective: 1 1,1 2,1 4   Therapist:  Gaston Rubio RN                    Case Management Note  [id number]  Cara Ernst met with this CM to discuss progress in Program  She stated that she still feels she has work to do so would like to continue working in [facility]    She denied SI and HI and stated that she would like to get back to swimming and doing some physical activity to decrease anxiety and depression and increase her   self-esteem  Cait Tim stated that she will be attending a weight management program and that she has very poor body image  Cait Tim stated that she used to be a swimmer and finds swimming, which she is very good at, a stress reliever  Susanteresa Glenroy stated that she is not feeling she is stabilized on her medications as yet but was encouraged to give it time and continue to work on CBT/DBT strategies, along with medication, in recovery  Sunny Rojo RN    Current suicide risk : Low     Medications changes/added/denied? No    Treatment session number: 8    Individual Case Management Visit provided today?  Yes     [facility] follow up physician's orders: N/A

## 2018-10-15 ENCOUNTER — OFFICE VISIT (OUTPATIENT)
Dept: PSYCHOLOGY | Facility: CLINIC | Age: 43
End: 2018-10-15
Payer: MEDICARE

## 2018-10-15 DIAGNOSIS — F31.4 BIPOLAR I DISORDER, MOST RECENT EPISODE DEPRESSED, SEVERE WITHOUT PSYCHOTIC FEATURES (HCC): Primary | Chronic | ICD-10-CM

## 2018-10-15 DIAGNOSIS — F41.1 GAD (GENERALIZED ANXIETY DISORDER): Chronic | ICD-10-CM

## 2018-10-15 PROCEDURE — G0177 OPPS/PHP; TRAIN & EDUC SERV: HCPCS

## 2018-10-15 PROCEDURE — G0176 OPPS/PHP;ACTIVITY THERAPY: HCPCS

## 2018-10-15 PROCEDURE — G0410 GRP PSYCH PARTIAL HOSP 45-50: HCPCS

## 2018-10-15 NOTE — PSYCH
Subjective:     Patient ID: Mayda Lees is a 38 y o  female  [facility] Clinical Progress Notes      Specialized Services Documentation  Therapist must complete separate progress note for each specific clinical activity in which the individual participated during the day         Education Therapy   Time:  2389-3354  Previous goal met: Yes   Readiness to Learning: Receptive  Barriers to Learning: None  Learning Assessment  Time: 8867-6448  Education Completed: Illness and Wellness Tools  Teaching Method: Verbal and Demonstration  Shared Area of Learning: Yes   Goal Set: Clean kitchen for 30 minutes  Tx Plan Objective: 1 1, 1 2, Therapist: Jessy Carpenter MTI, Raina Martinez MT-BC

## 2018-10-15 NOTE — PSYCH
Subjective:     Patient ID: Holland Shelton is a 38 y o  female  [facility] Clinical Progress Notes      Specialized Services Documentation  Therapist must complete separate progress note for each specific clinical activity in which the individual participated during the day  Group Psychotherapy   ([id number]) Group opened with introductions, mindful thought for the day, and identifying a current stressor  Barbra Adler  identified worrying about how the more open she is about her feelings, the worse she feels and she fears relapse to SI  She showed connection with other peers during discussion about dealing with those who do not understand mental health illness  Barbra Adler shared experiences of going out by her self and how they have been beneficial to her and her son's self-esteem and anxiety  She also offered local free activities to peers for places to go to meet people  Otherwise Barbra Adler avoided discussing any of her own personal needs  She will continue to develop healthy ways to refocus when feeling overwhelmed     Tx Plan Objective: 1 1, 1 2, Therapist:  Alexa Brown MA Intern  Tanmay BARBOSA

## 2018-10-15 NOTE — PSYCH
Subjective:     Patient ID: Smith Rodriguez is a 38 y o  female  [facility] Clinical Progress Notes      Specialized Services Documentation  Therapist must complete separate progress note for each specific clinical activity in which the individual participated during the day  Group Psychotherapy [id number]   Mona Lowe participated very actively in Mind/Body connection and how it affects emotions and wellness  Mona Lowe stated he has not over the sudden loss of her 1st  6 years ago and this has some impact on her her life even though her present  is "amazing,considerate,thoughtful and supportive "  Mona Mynor stated she is very "sad and depressed still but at least I do not have any suicidal thoughts so that's great "  Mona Lowe feels that her medications need adjusting especially her mood stabilizer  Pt made moderate progress towards her goals and continues to strive to meet her other goals  Menaroopa Magdalenamark verbalized how happy she feels because she is in program        Tx Plan Objective:1 1,1 2  Yasmeen Ireland RN                        Case Management Note [id number]   Mona Lowe expresses concern that her mood stablizer is not working and wants to discuss with psychiatrist about changing her medications  Menaroopa Lowe is asking for more days in treatment as she is gaining a lot from being in the program/     Yasmeen Ireland RN      Current suicide risk : Low         Medications changes/added/denied? No    Treatment session number: 9    Individual Case Management Visit provided today?  No

## 2018-10-15 NOTE — PSYCH
Subjective:     Patient ID: Flavia Orellana is a 38 y o  female  [facility] Clinical Progress Notes      Specialized Services Documentation  Therapist must complete separate progress note for each specific clinical activity in which the individual participated during the day  Allied Therapy   [id number] Kamleshedith Moreno actively shared in [facility] group focused on the WHAT skills of mindfulness  The group explored being non-judgmental, effective, and one-mindful  She was active in tasks and was able to make observations related to her continued discomfort with relaxation, yet able to voice this is effective to keep working on  She sees her discomfort as an "inability to let go of her need to control"  She was encouraged to explore the benefits of entering conversations with others and ways to listen mindfully  She remains tearful  Some progress voiced toward goals  Continue AT to encourage the awareness and practice of skills to manage anxiety and overwhelming emotion      Tx Plan Objective: 1 2, Therapist:  Kyara Apple MT-BC

## 2018-10-16 ENCOUNTER — OFFICE VISIT (OUTPATIENT)
Dept: PSYCHOLOGY | Facility: CLINIC | Age: 43
End: 2018-10-16
Payer: MEDICARE

## 2018-10-16 ENCOUNTER — OFFICE VISIT (OUTPATIENT)
Dept: BARIATRICS | Facility: CLINIC | Age: 43
End: 2018-10-16
Payer: MEDICARE

## 2018-10-16 VITALS
BODY MASS INDEX: 44.77 KG/M2 | DIASTOLIC BLOOD PRESSURE: 86 MMHG | TEMPERATURE: 98.9 F | HEART RATE: 88 BPM | SYSTOLIC BLOOD PRESSURE: 130 MMHG | RESPIRATION RATE: 18 BRPM | WEIGHT: 252.7 LBS | HEIGHT: 63 IN

## 2018-10-16 DIAGNOSIS — F41.1 GAD (GENERALIZED ANXIETY DISORDER): Primary | Chronic | ICD-10-CM

## 2018-10-16 DIAGNOSIS — F31.4 BIPOLAR I DISORDER, MOST RECENT EPISODE DEPRESSED, SEVERE WITHOUT PSYCHOTIC FEATURES (HCC): Primary | ICD-10-CM

## 2018-10-16 DIAGNOSIS — R73.01 ELEVATED FASTING GLUCOSE: ICD-10-CM

## 2018-10-16 DIAGNOSIS — E55.9 VITAMIN D DEFICIENCY: ICD-10-CM

## 2018-10-16 DIAGNOSIS — F41.1 GAD (GENERALIZED ANXIETY DISORDER): ICD-10-CM

## 2018-10-16 PROCEDURE — G0177 OPPS/PHP; TRAIN & EDUC SERV: HCPCS

## 2018-10-16 PROCEDURE — G0410 GRP PSYCH PARTIAL HOSP 45-50: HCPCS

## 2018-10-16 PROCEDURE — G0176 OPPS/PHP;ACTIVITY THERAPY: HCPCS

## 2018-10-16 PROCEDURE — 99204 OFFICE O/P NEW MOD 45 MIN: CPT | Performed by: PHYSICIAN ASSISTANT

## 2018-10-16 NOTE — PSYCH
Subjective:     Patient ID: Holli Lehman is a 38 y o  female  [facility] Clinical Progress Notes      Specialized Services Documentation  Therapist must complete separate progress note for each specific clinical activity in which the individual participated during the day  Group Psychotherapy : [id number]    Corona Mckeon participated in psychotherapy group that focused on the importance of developing a good support system sherrie  Polo Barreto was able to understand the importance of having that outside support system in place to prevent relapse  She plans to attend the depression/ bipolar support group and the support group for parents of autistic children after d/c   Mary Less was concerned about her medication regiment not working and this was discussed with Dr Jean Paul Muñoz MD today  Pt made mild progress towards her goals and will continue psychotherapy groups for continuity of care and progression towards her goals  Tx Plan Objective:  1 1,1 2  Kylie Vizcarra RN      Case Management Note:   Kylie Vizcarra RN      Current suicide risk : Low       Medications changes/added/denied? Yes Abilify increased to 22 5 mg    Treatment session number: 10    Individual Case Management Visit provided today

## 2018-10-16 NOTE — PSYCH
Subjective:     Patient ID: Flavia Orellana is a 38 y o  female  [facility] Clinical Progress Notes      Specialized Services Documentation  Therapist must complete separate progress note for each specific clinical activity in which the individual participated during the day  Group Psychotherapy   ([phone]) Kamlesh Moreno participated in psychotherapy group focused on growth from past experiences, and coping with grief/loss  Kamlesh Moreno identified feeling worse in program instead of better as a stressor today  She actively engaged in group discussion, sharing that she still struggles with anger and guilt about the death of her , and related to peers that discussed their struggles with similar experiences in their lives  Group discussed ways to cope with traumatic past experiences as well as grief/loss, and encouraged each other to focus on strengths and positives  Kamlesh Moreno was able to provide good support and feedback to her peers today  Moderate progress noted toward goals  Continue psychotherapy group to encourage Kamlesh Moreno to explore stressors and coping     Tx Plan Objective: 1 1, 1 2, 1 5, Therapist:  Audra BARBOSA

## 2018-10-16 NOTE — PROGRESS NOTES
Assessment/Plan:    SANA (generalized anxiety disorder)  Anxiety, bipolar and ADHD  Taking Abilify, klonopin, Lamictal, trazodone, tegretol, and Strattera     Vitamin D deficiency  Taking 2000 iu of vit d daily     BMI 40 0-44 9, adult ([facility] )  -Discussed options of [facility]-Intensive Lifestyle Intervention Program, Very Low Calorie Diet-VLCD, Conservative Program, Christen-En-Y Gastric Bypass and Vertical Sleeve Gastrectomy and the role of weight loss medications   -Initial weight loss goal of 5-10% weight loss for improved health  -Screening labs  Recommend checking lab coverage before having labs drawn   -lipid and tsh (9/27/2018) and CMP (9/29/2018) reviewed and all within acceptable limits except for elevated ldl and cholesterol  Fasting insulin and vit D ordered    - STOP BANG-2/8  -Patient is interested in pursuing Vertical Sleeve Gastrectomy      Follow up  For surgery seminar  Diagnoses and all orders for this visit:    SANA (generalized anxiety disorder)    Vitamin D deficiency  -     Vitamin D 25 hydroxy; Future  -     Insulin, fasting; Future    BMI 40 0-44 9, adult ([facility] )  -     Ambulatory referral to Weight Management  -     Vitamin D 25 hydroxy; Future  -     Insulin, fasting; Future    Elevated fasting glucose  -     Vitamin D 25 hydroxy; Future  -     Insulin, fasting; Future          Subjective:   Chief Complaint   Patient presents with    Consult     Pt is in the office for mwm consult  Patient ID: Cheryle Bastos  is a 38 y o  female with excess weight/obesity here to pursue weight management      Past Medical History:   Diagnosis Date    Acute pancreatitis     Last Assessed: 12Apr2016; due to gallstones (removed)    Anemia     Anxiety     Arthralgia     Concussion     Last Assessed: 11Oct2012    Endometriosis     Esophageal reflux     Familial combined hyperlipidemia     Last Assessed: 13Sep2013    Gastropathy     Head injury     History of sinusitis     Irregular heart beat  Migraine     Last Assessed: [id number]    Paroxysmal supraventricular tachycardia (HCC)     Peptic ulcer disease     Spontaneous      [facility]     Suicide attempt ([facility] )     UTI (urinary tract infection)     Vitamin D deficiency        HPI:  Obesity/Excess Weight:  Severity: Severe  Onset:  Last 15 years    Modifiers: Diet and Exercise  Contributing factors: Insufficient Physical Activity  Associated symptoms: decreased self esteem    Goals: 140 lbs  Hydration:no water intake, 1-2 glass of unsweetened ice tea and 1 ginger ale, 1 cup coffee with creamer  Alcohol: none    The following portions of the patient's history were reviewed and updated as appropriate: allergies, current medications, past family history, past medical history, past social history, past surgical history and problem list     Review of Systems   HENT: Negative for sore throat  Respiratory: Negative for cough and shortness of breath  Cardiovascular: Negative for chest pain and palpitations  Gastrointestinal: Negative for abdominal pain, constipation, diarrhea, nausea and vomiting         + GERD-- diet controlled    Endocrine: Positive for cold intolerance  Negative for heat intolerance  Genitourinary: Negative for dysuria  Musculoskeletal: Negative for arthralgias and back pain  Skin: Negative for rash  Neurological: Positive for headaches (discussed with pcp  are triggered by artifical sweetners )  Psychiatric/Behavioral: Negative for suicidal ideas (denies HI)  + depression and anxiety- mostly controlled        Objective:    /86 (BP Location: Left arm, Patient Position: Sitting, Cuff Size: Thigh)   Pulse 88   Temp 98 9 °F (37 2 °C) (Tympanic)   Resp 18   Ht 5' 3" (1 6 m)   Wt 115 kg (252 lb 11 2 oz)   BMI 44 76 kg/m²     Physical Exam   Nursing note and vitals reviewed  Constitutional   General appearance: Abnormal   well developed and morbidly obese     Eyes No conjunctival pallor  Ears, Nose, Mouth, and Throat Oral mucosa moist    Pulmonary   Respiratory effort: No increased work of breathing or signs of respiratory distress  Auscultation of lungs: Clear to auscultation, equal breath sounds bilaterally, no wheezes, no rales, no rhonci  Cardiovascular   Auscultation of heart: Normal rate and rhythm, normal S1 and S2, without murmurs  Examination of extremities for edema and/or varicosities: Normal   no edema  Abdomen   Abdomen: Abnormal   The abdomen was obese  Bowel sounds were normal  The abdomen was soft and nontender     Musculoskeletal   Gait and station: Normal     Psychiatric   Orientation to person, place and time: Normal     Affect: appropriate

## 2018-10-16 NOTE — PSYCH
Subjective:     Patient ID: Pancho Moss is a 38 y o  female  [facility] Clinical Progress Notes      Specialized Services Documentation  Therapist must complete separate progress note for each specific clinical activity in which the individual participated during the day  Allied Therapy  [id number]  Pancho Moss actively participated in [facility] group focused on identifying triggers  Group explored the build up of triggers, warning signs of distress, and ways to resolve the tension  Dinah Beasley shared examples of triggers and coping strategies with the group  Group engaged in discussion about the WRAP and understanding their personal tools and strategies for wellness  She expressed that she tends to recognize only a few triggers and found it helpful to learn about several other triggers from the group  Dinah Beasley is beginning to show receptivity to treatment strategies  Some progress noted towards goals  Continue AT to encourage wellness strategies and daily practice    Tx Plan Objective: 1 1, 1 2, Therapist: Cliff Wolf    Education Therapy   Time:  5972-7126  Previous goal met: No  Readiness to Learning: Receptive  Barriers to Learning: None  Learning Assessment  Time: 5480-5729  Education Completed: Illness, Medication and Wellness Tools  Teaching Method: Verbal and Demonstration  Shared Area of Learning: Yes   Goal Set: Talk to support about feelings  Tx Plan Objective: 1 1, 1 2, Therapist: Cliff Wolf

## 2018-10-16 NOTE — ASSESSMENT & PLAN NOTE
-Discussed options of Healthy[facility]-Intensive Lifestyle Intervention Program, Very Low Calorie Diet-VLCD, Conservative Program, Christen-En-Y Gastric Bypass and Vertical Sleeve Gastrectomy and the role of weight loss medications   -Initial weight loss goal of 5-10% weight loss for improved health  -Screening labs  Recommend checking lab coverage before having labs drawn   -lipid and tsh (9/27/2018) and CMP (9/29/2018) reviewed and all within acceptable limits except for elevated ldl and cholesterol   Fasting insulin and vit D ordered    - STOP BANG-2/8  -Patient is interested in pursuing Vertical Sleeve Gastrectomy

## 2018-10-17 ENCOUNTER — OFFICE VISIT (OUTPATIENT)
Dept: PSYCHOLOGY | Facility: CLINIC | Age: 43
End: 2018-10-17
Payer: MEDICARE

## 2018-10-17 DIAGNOSIS — F41.1 GAD (GENERALIZED ANXIETY DISORDER): Chronic | ICD-10-CM

## 2018-10-17 DIAGNOSIS — F31.4 BIPOLAR I DISORDER, MOST RECENT EPISODE DEPRESSED, SEVERE WITHOUT PSYCHOTIC FEATURES (HCC): Primary | Chronic | ICD-10-CM

## 2018-10-17 PROCEDURE — G0176 OPPS/PHP;ACTIVITY THERAPY: HCPCS

## 2018-10-17 PROCEDURE — G0410 GRP PSYCH PARTIAL HOSP 45-50: HCPCS

## 2018-10-17 PROCEDURE — G0177 OPPS/PHP; TRAIN & EDUC SERV: HCPCS

## 2018-10-17 NOTE — PSYCH
Subjective:     Patient ID: Mayda Lees is a 38 y o  female  [facility] Clinical Progress Notes      Specialized Services Documentation  Therapist must complete separate progress note for each specific clinical activity in which the individual participated during the day  Group Psychotherapy [id number]  Susie Agrawal actively participated in psychotherapy group focused on Metabolic syndrome and its effects on health  Susie Agrawal did stat that's he saw a weight management PA yesterday and is going to be starting a weight loss program with the help of the weight loss center  Susie Agrawal made moderate progress towards her goals and will continue psychotherapy group to achieve long term goals  Tx Plan Objective: 1 1,1 2  Cherry Sanches RN    Case Management Note : Cherry Sanches RN    Current suicide risk : Low     Medications changes/added/denied? Will begin taking Abilify 22 5 mg tonight    Treatment session number: 11    Individual Case Management Visit provided today?  Yes

## 2018-10-17 NOTE — PSYCH
Subjective:     Patient ID: Holli Lehman is a 38 y o  female  [facility] Clinical Progress Notes      Specialized Services Documentation  Therapist must complete separate progress note for each specific clinical activity in which the individual participated during the day  Allied Therapy  [id number]  Holli Lehman actively participated in [facility] group focused on the distress tolerance concept of radical acceptance  She engaged in the object meditation and shared her observations of the shell  Group engaged in reading and discussion, as well as an exercise to practice physically letting go  Group discussed concept that accepting something is not the same as liking it, but that acceptance is necessary to get through difficult moments that cannot be controlled  Derril Cover acknowledged that she needs to accept hurt and cannot stop difficult things from happening, like "putting a lid on a volcano " Some progress noted towards goals  Continue AT to explore wellness tools and encourage daily practice    Tx Plan Objective: 1 1, 1 2, Therapist: Chin BettsBC    Education Therapy   Time:  6159-7760  Previous goal met: Yes   Readiness to Learning: Receptive  Barriers to Learning: None  Learning Assessment  Time: 8644-2006  Education Completed: Illness, Medication and Wellness Tools  Teaching Method: Verbal and Demonstration  Shared Area of Learning: Yes   Goal Set: Finish making a daily schedule  Tx Plan Objective: 1 1, 1 2, Therapist: Chin BettsBC

## 2018-10-17 NOTE — PSYCH
Subjective:     Patient ID: David Ramírez is a 38 y o  female  [facility] Clinical Progress Notes      Specialized Services Documentation  Therapist must complete separate progress note for each specific clinical activity in which the individual participated during the day  Group Psychotherapy  ([id number]) Group opened with introductions, mindful thought for the day, and identifying a stressor  Hector Barber identified current stressors as being in a "super depressive episode," not getting better, and fear of not having anything to do with herself once she completes the PHP  She spoke at length about how not working, not going to school, and not having motivation to do anything has been overwhelming to her  She also spoke about how her house is "chaos" and even though it makes her uncomfortable to live in a messy house, there is still no motivation to care or do anything  Hector Barber was receptive to feedback from peers regarding how to make life seem more manageable while struggling with depression and anxiety  At times Hector Barber appeared irritated with one peer but she was able to maintain appropriate interactions with peer  She was tearful at times but regained composure when discussion moved to using arts and crafts as a way to cope with depression, find motivation to clean an area of the house, and to stay busy and out of her own head  Hector Barber will continue to develop and use healthy ways to refocus when feeling overwhelmed and anxious     Tx Plan Objective: 1 1, 1 2, 1 3, 1 4, Therapist: Grayson BARBOSA

## 2018-10-18 ENCOUNTER — DOCUMENTATION (OUTPATIENT)
Dept: PSYCHOLOGY | Facility: CLINIC | Age: 43
End: 2018-10-18

## 2018-10-18 NOTE — PROGRESS NOTES
Subjective:     Patient ID: Pancho Moss is a 38 y o  female  [facility] Clinical Progress Notes      Specialized Services Documentation  Therapist must complete separate progress note for each specific clinical activity in which the individual participated during the day  Case Management Note    Amaris Albright RN    Current suicide risk : Low     0915:  Call Pancho Moss and left message as she cancelled for day without giving reason  Medications changes/added/denied? No    Treatment session number: na    Individual Case Management Visit provided today?  No    [facility] follow up physician's orders: na

## 2018-10-19 ENCOUNTER — OFFICE VISIT (OUTPATIENT)
Dept: PSYCHOLOGY | Facility: CLINIC | Age: 43
End: 2018-10-19
Payer: MEDICARE

## 2018-10-19 DIAGNOSIS — F41.1 GAD (GENERALIZED ANXIETY DISORDER): Chronic | ICD-10-CM

## 2018-10-19 DIAGNOSIS — F31.4 BIPOLAR I DISORDER, MOST RECENT EPISODE DEPRESSED, SEVERE WITHOUT PSYCHOTIC FEATURES (HCC): Primary | Chronic | ICD-10-CM

## 2018-10-19 PROCEDURE — G0177 OPPS/PHP; TRAIN & EDUC SERV: HCPCS

## 2018-10-19 PROCEDURE — G0176 OPPS/PHP;ACTIVITY THERAPY: HCPCS

## 2018-10-19 PROCEDURE — G0410 GRP PSYCH PARTIAL HOSP 45-50: HCPCS

## 2018-10-19 NOTE — PSYCH
Subjective:     Patient ID: Flavia Orellana is a 38 y o  female  [facility] Clinical Progress Notes      Specialized Services Documentation  Therapist must complete separate progress note for each specific clinical activity in which the individual participated during the day  Group Psychotherapy:   [id number]      ZEKE Azuldeng Carnes participated   in weekly wellness group to discuss what particular area of wellness she has worked on  up to today in Program and choice of area to continue working on for recovery as targeted in treatment plan, by choice, or in WRAP  Madelin Carnes  shared that she  made   progress toward goals as far as communication with her  and was able to share with him that she was having a rough time last night with her anxiety and her depression  Madelin Carnes stated that she and her  took a long walk to help contol her anxiety and that did the job  Madelin Carnes made moderate progress towards her goals and she has all her supports available for this weekend in case she needs to use them  Continue to offer weekly wellness group to focus on goals and identify areas of goal achievement and need  Tx Plan Objective: 1 1,1 2        Case Management Note : [id number]   This  met with Madelin Carnes today  Madelin Carnes stated she feels " better and brighter since Dr Byrne Check increased my Abilify to 22 5 mg  "  Nat plans to be done with program by next Tuesday and is OK with this  Stepan Montanez RN    Current suicide risk : Low     Medications changes/added/denied? No    Treatment session number: 11    Individual Case Management Visit provided today?  yes  [facility] follow up physician's orders:  none

## 2018-10-19 NOTE — PSYCH
Subjective:     Patient ID: Flavia Orellana is a 38 y o  female  [facility] Clinical Progress Notes      Specialized Services Documentation  Therapist must complete separate progress note for each specific clinical activity in which the individual participated during the day  Group Psychotherapy ([id number]) Flavia Orellana participated in psychotherapy group and identified wanting to continue working on emotional regulation  Group participated in a mindfulness breathing exercise and this writer stressed importance of exhalation for stress reduction  Kamlesh Moreno was observed to fully engage in mindfulness breathing exercise  Group discussion centered on interpersonal issues and the challenges of dependent relationships  Many members shared their struggles with loneliness and the dependence it creates, which in turn negatively affects ones self esteem  Kamlesh Moreno shared personal experiencing with grief and figuring out how to start over after losing her   She related to her peers and offered support to them  Kamlesh Moreno was constructive and compassionate with her peers  This writer validated emotions for clients who shared, reflection and linking from client to client, and gave a metaphor for self-wholeness in relation to terminate unhealthy dependent relationships  Kamlesh Moreno is making progress towards her goal of coping with feeling overwhelmed and her anxiety  Kamlesh Moreno will continue to engage in psychotherapy group to enhance skill development and relate to others         Tx Plan Objective: 1 1, 1 2, Therapist:  Lanie Leavitt MA, LPC

## 2018-10-19 NOTE — PSYCH
Subjective:     Patient ID: Mike Guadarrama is a 38 y o  female  [facility] Clinical Progress Notes      Specialized Services Documentation  Therapist must complete separate progress note for each specific clinical activity in which the individual participated during the day  Allied Therapy  [id number]  Mike Guadarrama actively shared in [facility] group focused on DBT emotional regulation skill accumulating positive emotion and weekend planning  She engaged in hand chime exercise and was attentive during task, even though she expressed that she did not like the sound of the chimes  Group emphasized participating and focusing on positive tasks to change uncomfortable emotions (active versus passive process)  She was given Pleasant Events List and shared she could commit to doing one of them to work on increasing pleasant events this weekend  She recalled the skill "opposite action" during discussion and noted that it was helpful to think of the pleasant events list in terms of the DBT concept of the dialectic  She later shared that it was helpful to review a list of positive events as it reminded her to see simple daily things as positive  Some positive exploration of goal observed and shared  Continue AT to encourage the awareness of strengths; strategies and personal application    Tx Plan Objective: 1 1, 1 2, Therapist: Devyn Ro [facility]    Education Therapy   Time:  6767-0228  Previous goal met: No  Readiness to Learning: Receptive  Barriers to Learning: None  Learning Assessment  Time: 6582-9120  Education Completed: Illness and Wellness Tools  Teaching Method: Verbal and Demonstration  Shared Area of Learning: Yes   Goal Set: Talk with family to delegate tasks  Tx Plan Objective: 1 1, 1 2, Therapist: Devyn Ro [facility]

## 2018-10-22 ENCOUNTER — OFFICE VISIT (OUTPATIENT)
Dept: PSYCHOLOGY | Facility: CLINIC | Age: 43
End: 2018-10-22
Payer: MEDICARE

## 2018-10-22 DIAGNOSIS — F41.1 GAD (GENERALIZED ANXIETY DISORDER): Chronic | ICD-10-CM

## 2018-10-22 DIAGNOSIS — F31.4 BIPOLAR I DISORDER, MOST RECENT EPISODE DEPRESSED, SEVERE WITHOUT PSYCHOTIC FEATURES (HCC): Primary | Chronic | ICD-10-CM

## 2018-10-22 DIAGNOSIS — F90.0 ADHD, PREDOMINANTLY INATTENTIVE TYPE: Chronic | ICD-10-CM

## 2018-10-22 PROCEDURE — G0177 OPPS/PHP; TRAIN & EDUC SERV: HCPCS

## 2018-10-22 PROCEDURE — G0410 GRP PSYCH PARTIAL HOSP 45-50: HCPCS

## 2018-10-22 PROCEDURE — G0176 OPPS/PHP;ACTIVITY THERAPY: HCPCS

## 2018-10-22 NOTE — PSYCH
Subjective:     Patient ID: Vazquez Whittaker is a 38 y o  female  [facility] Clinical Progress Notes      Specialized Services Documentation  Therapist must complete separate progress note for each specific clinical activity in which the individual participated during the day  Group Psychotherapy   [id number]  Gypsy Almas, RN Madilyn Boeck actively participated in psychotherapy group that focused on the diagnosis of depression and compliance with medication and after care to avoid relapse  Madilyn Boeck was able to comprehend the education and was able to help other group members when thay had questions  Madilyn Boeck made moderated progress towards her goals and is looking forward for discharge tomorrow  Madilyn Boeck appreciated the skills she gained while in program   Madilyn Boeck plans to continue her follow up care to maintain wellness  Tx Plan Objective: 1 1,1 2      Case Management Note:  [id number]  Isabel Lyles RN    Met with Madilyn Boeck today and Madilyn Boeck stated she is ready for discharge and stated " I am in a good place  I gained a lot of this program and I acquired skills that I need to succeed "      Current suicide risk : Low       Medications changes/added/denied? No    Treatment session number: 13    Individual Case Management Visit provided today?  Yes

## 2018-10-22 NOTE — PSYCH
Subjective:     Patient ID: Flavia Orellana is a 38 y o  female  [facility] Clinical Progress Notes      Specialized Services Documentation  Therapist must complete separate progress note for each specific clinical activity in which the individual participated during the day  Group Psychotherapy   (0930 to 1030) - Flavia Orellana participated in psychotherapy group today  Kamlesh Moreno identified feeling stressed regarding graduating from program   The group participated in a mindfulness exercise that emphasized the importance of being able to take a step back from stressors in order to gain perspective and reassess them  Group discussion centered on self-responsibility and responsibilities in general, and how they impact mood  Kamlesh Moreno identified challenges with feeling like she is not ready for discharge from the program   She discussed having challenges with thinking of ways to occupy her mind during her time off without rumination  Kamlesh Moreno looked to peers often for support and was accepting of constructive feedback  Kamlesh Moreno also offered support to her peers during group as well and utilized others as a means for therapeutic suggestions of activities and plans to keep herself busy  This writer provided self-monitoring information and the rationale for continued use in order to improve mood and motivate coping behaviors  Kamlesh Moreno is making moderate progress towards goals and is encouraged to continue to participate in group therapy to work on accomplishing long term goals         Tx Plan Objective: 1 1, 1 2, Therapist:  Ines Oshea MA, LPC

## 2018-10-22 NOTE — PSYCH
Subjective:     Patient ID: Shabbir Fair is a 38 y o  female  [facility] Clinical Progress Notes      Specialized Services Documentation  Therapist must complete separate progress note for each specific clinical activity in which the individual participated during the day  Allied Therapy  [id number]  Shabbir Fair actively engaged in [facility] group focused on DBT skill mindfulness  Group discussed openness, staying in the present moment, and using mantras to replace negative thoughts  Gillian Ragsdale was observed to engage in meditation and progressive muscle relaxation, noting that relaxation is becoming easier for her  She engaged in janeth analysis about having an open mind, sharing that it is important to be yourself, even when others want you to be someone else  Some work towards goal noted  Continue AT to encourage positive coping skills and daily practice    Tx Plan Objective: 1 1, 1 2, Therapist: Shoaib Larsen MT-BC    Education Therapy   Time:  3473-0776  Previous goal met: Yes   Readiness to Learning: Receptive  Barriers to Learning: None  Learning Assessment  Time: 5655-5207  Education Completed: Illness, Aftercare and Wellness Tools  Teaching Method: Verbal and Demonstration  Shared Area of Learning: Yes   Goal Set: Complete discharge paperwork  Tx Plan Objective: 1 1, 1 2, Therapist: Shoaib Larsen MT-BC

## 2018-10-23 ENCOUNTER — OFFICE VISIT (OUTPATIENT)
Dept: PSYCHOLOGY | Facility: CLINIC | Age: 43
End: 2018-10-23
Payer: MEDICARE

## 2018-10-23 DIAGNOSIS — F41.1 GENERALIZED ANXIETY DISORDER: ICD-10-CM

## 2018-10-23 DIAGNOSIS — F31.4 SEVERE DEPRESSED BIPOLAR I DISORDER WITHOUT PSYCHOTIC FEATURES (HCC): Primary | ICD-10-CM

## 2018-10-23 PROCEDURE — G0410 GRP PSYCH PARTIAL HOSP 45-50: HCPCS

## 2018-10-23 PROCEDURE — G0177 OPPS/PHP; TRAIN & EDUC SERV: HCPCS

## 2018-10-23 PROCEDURE — G0176 OPPS/PHP;ACTIVITY THERAPY: HCPCS

## 2018-10-23 NOTE — PSYCH
[facility] Clinical Progress Notes      Specialized Services Documentation  Therapist must complete separate progress note for each specific clinical activity in which the individual participated during the day         [facility] follow up physician's orders:   DATE 10/23/2018  TIME 10:30 AM   DISCHARGE TODAY  Jimmie Silva MD

## 2018-10-23 NOTE — PSYCH
Subjective:     Patient ID: Rock Avery is a 38 y o  female  [facility] Discharge Summary:     Admission Date: 10/02/2018    Patient was referred by Dr Lalita Arnett    Discharge Date: 10/23/2018     Was this a routine discharge? yes     Diagnosis: Axis I:   1  Severe depressed bipolar I disorder without psychotic features ([address] )     2  Generalized anxiety disorder        Treating Physician:  Dr Silvana MD    Treatment Complications: none    Presenting Need:      Per Dr Zainab Zacarias evaluation:    Rock Avery is a 38 y o  female with bipolar disorder, anxiety and ADHD who came referred from [address] [place] [address] where she was admitted from 5 /26/2018 to 10/1/2018 due recent worsening of depression, anxiety and suicidal ideation with plan to drive car into a tree, drive car off a road or overdose on medications  She said that suicidal thoughts were more intensive recently and very intrusive  She felt overwhelmed with increased depressive symptoms, was very distressed ,  tearful and anxious   Symptoms prior to admission included worsening depression, suicidal ideation, hopelessness, helplessness, poor concentration, poor appetite, weight gain, hypersomnia, mood swings, and anxiety attacks  Onset of symptoms was gradual starting 4 weeks ago with progressively worsening course since that time  Stressors preceding admission included occupational problems (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)  TodayKitlynette Willson feels anxious, denies any suicidal or homicidal thoughts, plan or intent and denies any psychotic symptoms  She states feels better but is very anxious because she never has been in Partial , but she want to do better       Course of treatment includes:     group counseling, medication management, individual case management, allied therapy, psychoeducation and psychiatric evaluation     Treatment Progress:    Nat attended 14 treatment days in which she participated and actively worked on distress tolerance skills,mindfullness,self care, anxiety with feeling overwhelmed and boundaries  She was a positive participant in groups and assignments outside of groups  Her Abilify was increased to 22 5 mg and  stated she felt better with that increase  Upon discharge she voices feeling " more able to deal with life and all its stressors " Julieta Comas denied SI/HI and psychosis upon discharge  She has her follow up appointment with Dr Pan Kelly and Paolo Kelly  Aftercare recommendations include: Follow-up appointments/Referrals:   Dr Pan Kelly 11/8/2018 at 4:30 pm  Paolo Yao 11/19/2018 at [address] to use WRAP, keep appointments and continue medications      Discharge Medications include:  Current Outpatient Prescriptions:     ARIPiprazole (ABILIFY) 15 mg tablet, Take 1 tablet (15 mg total) by mouth daily at bedtime for 90 days, Disp: 30 tablet, Rfl: 2    atoMOXetine (STRATTERA) 40 mg capsule, Take 1 capsule (40 mg total) by mouth daily for 90 days, Disp: 30 capsule, Rfl: 2    benztropine (COGENTIN) 0 5 mg tablet, Take 1 tablet (0 5 mg total) by mouth 2 (two) times a day as needed for tremors for up to 90 days, Disp: 60 tablet, Rfl: 2    carBAMazepine (TEGretol) 200 mg tablet, Take 1 tablet (200 mg total) by mouth 2 (two) times a day for 90 days, Disp: 60 tablet, Rfl: 2    clonazePAM (KlonoPIN) 0 5 mg tablet, Take 1 tablet (0 5 mg total) by mouth 3 (three) times a day for 90 days, Disp: 90 tablet, Rfl: 2    lamoTRIgine (LaMICtal) 150 MG tablet, Take 2 tablets (300 mg total) by mouth daily at bedtime for 90 days, Disp: 60 tablet, Rfl: 2    traZODone (DESYREL) 50 mg tablet, Take 1 tablet (50 mg total) by mouth daily at bedtime as needed for sleep for up to 90 days, Disp: 30 tablet, Rfl: 2    Vitamin D, Cholecalciferol, 1000 units CAPS, Take 2,000 Units by mouth, Disp: , Rfl: Please note Abilify was increased to 22 5 mg by dr Rehana Felix on 10/17/2018

## 2018-10-23 NOTE — PSYCH
Diagnoses and all orders for this visit:      SANA (generalized anxiety disorder)     Bipolar I disorder, most recent episode depressed, severe without psychotic features ([facility] [address] )     ADHD, predominantly inattentive type     Subjective:     Patient ID: Margi Rock is a 38year old female     [facility] Treatment Plan   AREAS OF NEED:  Depression and anxiety as manifested by recent inpatient [facility] hospitalization due to recent intensive and intrusive suicidal ideation with plan, feeling overwhelmed, tearful, anxious, poor concentration, feeling hopeless, helplessness, 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, poor appetite, weight gain, hypersomnia, mood swings and anxiety attacks  Date Initiated: 10/2/18     Strengths:  Some insight into need for treatment and willingness to participate in [facility] PHP      LONG TERM GOAL:   Date Initiated: 10/2/18  1 0 I will identify three signs that my mood is more controlled and I am using healthy coping skills to handle my stress, anxiety and depression  Target Date: 10/30/18  Revision Date:  Completion Date: 10/23/2018     SHORT TERM OBJECTIVES:    Date Initiated: 10/2/18  1 1   I will identify and practice daily three healthy ways to refocus myself when I am feeling overwhelmed, anxious or depressed  Revision Date: 10/11/18  Target Date: 10/11/18  Completion Date: 10/23/2018     Date Initiated: 10/2/18  1 2 I will write out three healthy coping skills learned in [facility] to reduce my symptoms of anxiety and I will practice at least one of them daily  Revision Date: 10/11/18  Target Date: 10/11/18  Completion Date:10/23/2018     Date Initiated:  10/2/18  1 3 I will take medications as prescribed and share any questions or concerns when, or if, they arise    Revision Date: 10/11/18  Target Date: 10/11/18  Completion Date: 10/23/2018     Date Initiated:  10/2/18  1 4 I will name three supports and I will identify specific ways my supports can assist me in my recovery  Revision Date: 10/11/18  Target Date: 10/11/18  Completion Date:10/23/2018       Treatment Plan Update:  Date Initiated:  10/11/18  1 1,1 2,1 3,1 4  Continue to work on these goals as they remain relevant but unmet  Target Date:  10/23/18  Completion Date:  10/23/2018      PSYCHIATRY:  Medication Management and education  Date Initiated: 10/2/18  Revision Date: 10/11/18  The person(s) responsible for carrying out the plan Dayan Knapp MD     NURSIN 1,1 2,1 3,1 4 This RN will provide daily wellness group five days weekly to educate Christine Peters on individual diagnoses and medications used in treatment  Date Initiated: 10/2/18  Revision Date: 10/11/18  The person(s) responsible for carrying out the plan is Abel Sheridan RN     PSYCHOLOGY:   1 1,1 2,1 3,1 4 Provide psychotherapy group five times weekly to allow opportunity for Magri to explore stressors and ways of coping  Date Initiated: 10/2/18  Revision Date:  10/11/18  The person(s) responsible for carrying out the plan is Maribel Perez MSROSE MARIE,LSW     ALLIED THERAPY:   1 1,1 2  Engage Margi in AT group five times weekly to encourage development and use of wellness tools to decrease symptoms and promote recovery through meaningful activity  Date Initiated: 10/2/18  Revision Date: 10/11/18  The person(s) responsible for carrying out the plan is WOODROW Dasilva      CASE MANAGEMENT:  Date Initiated: 10/2/18  Revision Date: 10/11/18  1 0 This CM will meet with Christine Peters three to four times weekly to assess treatment progress, D/C planning and connection to community supports and UR as indicated     The person(s) responsible for carrying out the plan is Abel Sheridan RN     DISCHARGE CRITERIA: Identify 3 signs of progress and complete relapse prevention plan      DISCHARGE PLAN: Return to prior outpatient providers; Dr Elvis Skinner and Carmelita Kelly, [facility]     Estimated Length of Stay: 10 days     DSM Diagnoses:     SANA (generalized anxiety disorder)     Bipolar I disorder, most recent episode depressed, severe without psychotic features ([address] )     ADHD, predominantly inattentive type               Diagnosis and Treatment Plan explained to Feilpe wheeler understanding diagnosis and is agreeable to Treatment Plan                CLIENT COMMENTS / Please share your thoughts, feelings, need and/or experiences regarding your treatment plan: _____________________________________________________________________________________________________________________________________________________________________________________________________________________________________________________________________________________________________________________ Date/Time: ______________      Patient Signature: _________________________________      Date/Time: ______________       Signature: _________________________________      Date/Time: ______________                Electronically signed by Radha Sheldon RN at 10/11/2018  4:18 PM

## 2018-10-23 NOTE — PSYCH
Subjective:     Patient ID: Nadira Hubbard is a 38 y o  female  [facility] Clinical Progress Notes      Specialized Services Documentation  Therapist must complete separate progress note for each specific clinical activity in which the individual participated during the day  Group Psychotherapy [id number]  Kelvin Lobato RN    Franco Joe actively participated in psycho therapy group today that focused on sleep hygiene and how it effects mind and body  Franco Joe was able to actively listen and understand the importance of a good sleep regiment  Franco Joe did state that she needs adequate sleep or she finds herself being more depressed,anxious and overwhelmed  Franco Joe made moderate progress towards her goals  Franco Joe is ready for d/c today  Franco Joe will continue with outpatient therapist and psychiatrist       Tx Plan Objective:1 1,1 2        Case Management Note :  [id number]  Kelvin Lobato RN    Met with Franco Tatyana to discuss her discharged plans and follow up appointments  Franco Joe remains motivated for recovery and completed her relapse prevention plan and her WRAP  Franco Joe denies SI/HI and denies psychosis  She stated she is " grateful and pleased that I had the chance to attend this program "          Current suicide risk : Low     Medications changes/added/denied? No    Treatment session number: 13    Individual Case Management Visit provided today?  Yes  [facility] follow up physician's orders: Discharged instructions reviewed with follow up appointments with Psychiatrist and therapist

## 2018-10-23 NOTE — PSYCH
[facility] Discharge Instructions:   Disposition: home  Address: [address] [place] [address] Street [id number]    Diagnosis   Encounter Diagnoses   Name Primary?  Severe depressed bipolar I disorder without psychotic features ([address] ) Yes    Generalized anxiety disorder        Allergies (Drug/Food): Allergies   Allergen Reactions    Geodon [Ziprasidone]      Nystagmus    Quetiapine      Other reaction(s): Other (See Comments)  Per pt low blood pressure and fainting    Other      Seasonal allergies     Activity: no restrictions  Diet:no recommendations  Smoking Cessation:not a smoker   Diagnostic/Laboratory Orders:   Vaccines: If you received a vaccine, please notify your family physician on your next visit  For more information, please call [phone]  Follow-up appointments/Referrals:   Dr Rico Seals 11/8/2018 at 4:30 pm  Rola Rosales 11/19/2018 at [address]    [facility]  [facility]: [phone]  Crisis Intervention (Emergency) [place] Service: [facility]: [phone]  National Crisis Intervention Hotline: 9-750.702.6605  National Suicide Crisis Hotline: 5-480.672.7577  I, the undersigned, have received and understand the above instructions        Patient/Rep Signature: __________________________________     Date/Time: ______________     Physician Signature: ____________________________________    Date/Time: ______________      Signature: ________________________________     Date/Time: ______________

## 2018-10-23 NOTE — PSYCH
Subjective:     Patient ID: Kandi Domingo is a 38 y o  female  [facility] Clinical Progress Notes      Specialized Services Documentation  Therapist must complete separate progress note for each specific clinical activity in which the individual participated during the day  Allied Therapy  6060-7799  Kandi Domingo actively engaged in [facility] group focused on DBT module distress tolerance  Group discussed skills STOP, Pros and Cons, TIP, and [employer] ACCEPTS  Group discussed the importance of taking a break from distressing emotions in order to effectively manage difficult situations and was given a list of activities to distract  Maria Del Rosarioarslan Salazar was observed to  actively participate in listening activity and relaxation exercise  She shared that she found the skill STOP helpful  Maria Del Rosario Salazar also shared positive encouragement about practicing skills even when they are difficult at first  Some positive progress noted towards goals  Discharge at the end of the treatment day    Tx Plan Objective: 1 1, 1 2, Therapist: Parvez Chavarria    Education Therapy   Time:  4441-2587  Previous goal met: Yes   Readiness to Learning: Receptive  Barriers to Learning: None  Learning Assessment  Time: 0221-9566  Education Completed: Illness, Medication, Aftercare and Wellness Tools  Teaching Method: Verbal, Written and Demonstration  Shared Area of Learning: Yes   Goal Set: Shared encouragement and learning with her peers  Tx Plan Objective: 1 0, Therapist: Parvez Chavarria

## 2018-10-23 NOTE — PSYCH
Subjective:     Patient ID: Vazquez Whittaker is a 38 y o  female  [facility] Clinical Progress Notes      Specialized Services Documentation  Therapist must complete separate progress note for each specific clinical activity in which the individual participated during the day  Group Psychotherapy     (0930 to 1030) - Vazquez Whittaker participated in psychotherapy group today  Junaid Babb identified wanting to finish program strong and struggles with control  This writer facilitated a mindfulness exercise that targeted distance from situations to lessen the negative reaction to the situation  Group discussion centered initially on self-care and challenges with remaining on course, and led to processing on control and loss of control  This writer emphasized the concept of acceptance being the opposing side of control, and finding stress relief in acceptance  Junaid Babb shared her recent self-monitoring and recognizing her times of needing more control  She received support from peers, thanked them, and returned the gesture  She empathized with a peer regarding loss and offered grief resources she used in the past   Junaid Babb  made progress towards goals and is encouraged to utilize the skills learned at [facility] upon her discharge today       Tx Plan Objective: 1 1, 1 2, Therapist:  Aline Brock MA, LPC

## 2018-11-09 ENCOUNTER — DOCUMENTATION (OUTPATIENT)
Dept: PSYCHIATRY | Facility: CLINIC | Age: 43
End: 2018-11-09

## 2018-11-30 ENCOUNTER — APPOINTMENT (OUTPATIENT)
Dept: RADIOLOGY | Age: 43
End: 2018-11-30
Payer: MEDICARE

## 2018-11-30 ENCOUNTER — OFFICE VISIT (OUTPATIENT)
Dept: INTERNAL MEDICINE CLINIC | Facility: CLINIC | Age: 43
End: 2018-11-30
Payer: MEDICARE

## 2018-11-30 VITALS
OXYGEN SATURATION: 95 % | WEIGHT: 251 LBS | SYSTOLIC BLOOD PRESSURE: 108 MMHG | BODY MASS INDEX: 44.47 KG/M2 | HEIGHT: 63 IN | HEART RATE: 83 BPM | TEMPERATURE: 98.5 F | DIASTOLIC BLOOD PRESSURE: 78 MMHG

## 2018-11-30 DIAGNOSIS — R06.02 SHORTNESS OF BREATH: ICD-10-CM

## 2018-11-30 DIAGNOSIS — R68.89 FLU-LIKE SYMPTOMS: ICD-10-CM

## 2018-11-30 DIAGNOSIS — R68.89 FLU-LIKE SYMPTOMS: Primary | ICD-10-CM

## 2018-11-30 PROCEDURE — 99213 OFFICE O/P EST LOW 20 MIN: CPT | Performed by: NURSE PRACTITIONER

## 2018-11-30 PROCEDURE — 71046 X-RAY EXAM CHEST 2 VIEWS: CPT

## 2018-11-30 RX ORDER — PREDNISONE 20 MG/1
40 TABLET ORAL DAILY
Qty: 10 TABLET | Refills: 0 | Status: SHIPPED | OUTPATIENT
Start: 2018-11-30 | End: 2018-12-05

## 2018-11-30 NOTE — PATIENT INSTRUCTIONS
Go for Chest Xray, I will call later with results  Start Mucinex around the clock x 3 days  Start prednisone 2 tablets in the morning with food  Follow up if not improving or for any worsening symtoms

## 2018-11-30 NOTE — PROGRESS NOTES
Assessment/Plan:     Diagnoses and all orders for this visit:    Flu-like symptoms  -     predniSONE 20 mg tablet; Take 2 tablets (40 mg total) by mouth daily for 5 days  -     XR chest pa & lateral; Future  -     Patient very likely had flu last week  Due to new shortness of breath and cough will start her on prednisone and mucinex and send for CXR to r/o pneumonia  Advised to follow up for fever or worsening symptoms  Advised when she is feeling better, I would still recommend her getting the flu shot  Shortness of breath  -     predniSONE 20 mg tablet; Take 2 tablets (40 mg total) by mouth daily for 5 days  -     XR chest pa & lateral; Future       Subjective:     Patient ID: Jorge Cote is a 38 y o  female  HPI     Patient presents today complaining of cold symptoms  Onset was 9 days ago  Last week she started with a sudden onset of body aches, fever 102, rundown, thick rhinorrhea  States that 1/2 through the day she felt like she got hit by a truck  Her fever resolved in 1 day  Continued with low grade fever for 2 more days  She did not have the flu shot  Today she complains of stuffy nose, losing her voice and productive cough with yellow phlegm  She complains of SOB and not being able to take a deep breath  The shortness of breath and cough are new in the last few days  The following portions of the patient's history were reviewed and updated as appropriate: allergies, current medications, past family history, past medical history, past social history, past surgical history and problem list     Review of Systems   Constitutional: Positive for diaphoresis and fatigue  Negative for chills and fever (resolved)  HENT: Positive for congestion, postnasal drip, rhinorrhea and voice change (losing her voice)  Negative for ear pain (no pain, "stuffiness"), sinus pain, sinus pressure and sore throat  Respiratory: Positive for cough, chest tightness and shortness of breath  Negative for wheezing  Cardiovascular: Positive for chest pain (upper right side, worse with cough or deep breath)  Gastrointestinal: Negative for diarrhea (resolved), nausea (resolved) and vomiting (resolved)  Musculoskeletal: Negative for myalgias (resolved)  Skin: Negative for rash  Neurological: Negative for dizziness, light-headedness and headaches           Past Medical History:   Diagnosis Date    Acute pancreatitis     Last Assessed: 2016; due to gallstones (removed)    Anemia     Anxiety     Arthralgia     Concussion     Last Assessed: 2012    Endometriosis     Esophageal reflux     Familial combined hyperlipidemia     Last Assessed: 2013    Gastropathy     Head injury     History of sinusitis     Irregular heart beat     Migraine     Last Assessed: 2012    Paroxysmal supraventricular tachycardia (HCC)     Peptic ulcer disease     Spontaneous      Substance abuse ([facility] )     Suicide attempt ([facility] )     UTI (urinary tract infection)     Vitamin D deficiency          Current Outpatient Prescriptions:     ARIPiprazole (ABILIFY) 15 mg tablet, Take 1 tablet (15 mg total) by mouth daily at bedtime for 90 days, Disp: 30 tablet, Rfl: 2    atoMOXetine (STRATTERA) 40 mg capsule, Take 1 capsule (40 mg total) by mouth daily for 90 days, Disp: 30 capsule, Rfl: 2    benztropine (COGENTIN) 0 5 mg tablet, Take 1 tablet (0 5 mg total) by mouth 2 (two) times a day as needed for tremors for up to 90 days, Disp: 60 tablet, Rfl: 2    carBAMazepine (TEGretol) 200 mg tablet, Take 1 tablet (200 mg total) by mouth 2 (two) times a day for 90 days, Disp: 60 tablet, Rfl: 2    clonazePAM (KlonoPIN) 0 5 mg tablet, Take 1 tablet (0 5 mg total) by mouth 3 (three) times a day for 90 days, Disp: 90 tablet, Rfl: 2    lamoTRIgine (LaMICtal) 150 MG tablet, Take 2 tablets (300 mg total) by mouth daily at bedtime for 90 days, Disp: 60 tablet, Rfl: 2    traZODone (DESYREL) 50 mg tablet, Take 1 tablet (50 mg total) by mouth daily at bedtime as needed for sleep for up to 90 days, Disp: 30 tablet, Rfl: 2    Vitamin D, Cholecalciferol, 1000 units CAPS, Take 2,000 Units by mouth, Disp: , Rfl:     Allergies   Allergen Reactions    Geodon [Ziprasidone]      Nystagmus    Quetiapine      Other reaction(s): Other (See Comments)  Per pt low blood pressure and fainting    Other      Seasonal allergies       Social History   Past Surgical History:   Procedure Laterality Date     SECTION      FOREARM SURGERY Left     INDUCED       x3    LAPAROSCOPIC CHOLECYSTECTOMY      Last Assessed: 2016    LAPAROSCOPY      Exploratory    TONSILLECTOMY      Onset: [id number]     Family History   Problem Relation Age of Onset    Hyperthyroidism Mother     Thyroid disease Mother    Lainey Santiago Depression Father     Hypertension Father     Obesity Father     Other Paternal Grandmother         Cardiac Disorder    Dementia Paternal Grandmother     Hypertension Paternal Grandmother     Diabetes Paternal Grandmother     Hypertension Paternal Grandfather     Other Paternal Grandfather         Cardiac Disorder    Diabetes Paternal Grandfather     Schizophrenia Other     Schizophrenia Other     Suicidality Other     Completed Suicide  Other     Breast cancer Other     Hypertension Paternal Aunt     Stroke Neg Hx        Objective:  /78 (BP Location: Left arm, Patient Position: Sitting, Cuff Size: Large)   Pulse 83   Temp 98 5 °F (36 9 °C) (Oral)   Ht 5' 3" (1 6 m)   Wt 114 kg (251 lb)   SpO2 95%   BMI 44 46 kg/m²      Physical Exam   Constitutional: She is oriented to person, place, and time  She appears well-developed and well-nourished  No distress  Morbidly obese   HENT:   Head: Normocephalic and atraumatic     Right Ear: Tympanic membrane and external ear normal    Left Ear: External ear normal    Nose: Nose normal    Mouth/Throat: Oropharynx is clear and moist  No oropharyngeal exudate, posterior oropharyngeal edema or posterior oropharyngeal erythema  Left TM obscured by impacted cerumen    Eyes: Pupils are equal, round, and reactive to light  Conjunctivae and EOM are normal    Neck: Normal range of motion  Neck supple  No thyromegaly present  Cardiovascular: Normal rate, regular rhythm and normal heart sounds  No murmur heard  Pulmonary/Chest: Effort normal  No respiratory distress  She has decreased breath sounds in the right lower field and the left lower field  She has wheezes in the left upper field and the left lower field  She has no rhonchi  She has no rales  Lymphadenopathy:     She has cervical adenopathy  Right cervical: Posterior cervical adenopathy present  Left cervical: Posterior cervical adenopathy present  Neurological: She is alert and oriented to person, place, and time  Skin: Skin is warm and dry  She is not diaphoretic  Psychiatric: She has a normal mood and affect  Her behavior is normal    Vitals reviewed

## 2018-12-04 ENCOUNTER — OFFICE VISIT (OUTPATIENT)
Dept: INTERNAL MEDICINE CLINIC | Facility: CLINIC | Age: 43
End: 2018-12-04
Payer: MEDICARE

## 2018-12-04 VITALS
BODY MASS INDEX: 45.49 KG/M2 | HEART RATE: 82 BPM | OXYGEN SATURATION: 95 % | WEIGHT: 256.8 LBS | SYSTOLIC BLOOD PRESSURE: 122 MMHG | TEMPERATURE: 98.7 F | DIASTOLIC BLOOD PRESSURE: 70 MMHG

## 2018-12-04 DIAGNOSIS — R07.9 CHEST PAIN ON EXERTION: ICD-10-CM

## 2018-12-04 DIAGNOSIS — R06.02 SHORTNESS OF BREATH ON EXERTION: Primary | ICD-10-CM

## 2018-12-04 LAB
ALBUMIN SERPL BCP-MCNC: 3.6 G/DL (ref 3.5–5)
ALP SERPL-CCNC: 101 U/L (ref 46–116)
ALT SERPL W P-5'-P-CCNC: 58 U/L (ref 12–78)
ANION GAP SERPL CALCULATED.3IONS-SCNC: 8 MMOL/L (ref 4–13)
AST SERPL W P-5'-P-CCNC: 19 U/L (ref 5–45)
BASOPHILS # BLD AUTO: 0.05 THOUSANDS/ΜL (ref 0–0.1)
BASOPHILS NFR BLD AUTO: 1 % (ref 0–1)
BILIRUB SERPL-MCNC: 0.13 MG/DL (ref 0.2–1)
BUN SERPL-MCNC: 14 MG/DL (ref 5–25)
CALCIUM SERPL-MCNC: 9.6 MG/DL (ref 8.3–10.1)
CHLORIDE SERPL-SCNC: 103 MMOL/L (ref 100–108)
CK SERPL-CCNC: 51 U/L (ref 26–192)
CO2 SERPL-SCNC: 25 MMOL/L (ref 21–32)
CREAT SERPL-MCNC: 0.97 MG/DL (ref 0.6–1.3)
DEPRECATED D DIMER PPP: 268 NG/ML (FEU)
EOSINOPHIL # BLD AUTO: 0 THOUSAND/ΜL (ref 0–0.61)
EOSINOPHIL NFR BLD AUTO: 0 % (ref 0–6)
ERYTHROCYTE [DISTWIDTH] IN BLOOD BY AUTOMATED COUNT: 13 % (ref 11.6–15.1)
GFR SERPL CREATININE-BSD FRML MDRD: 73 ML/MIN/1.73SQ M
GLUCOSE SERPL-MCNC: 106 MG/DL (ref 65–140)
HCT VFR BLD AUTO: 38.4 % (ref 34.8–46.1)
HGB BLD-MCNC: 12.4 G/DL (ref 11.5–15.4)
IMM GRANULOCYTES # BLD AUTO: 0.11 THOUSAND/UL (ref 0–0.2)
IMM GRANULOCYTES NFR BLD AUTO: 1 % (ref 0–2)
LYMPHOCYTES # BLD AUTO: 1.68 THOUSANDS/ΜL (ref 0.6–4.47)
LYMPHOCYTES NFR BLD AUTO: 16 % (ref 14–44)
MCH RBC QN AUTO: 28.1 PG (ref 26.8–34.3)
MCHC RBC AUTO-ENTMCNC: 32.3 G/DL (ref 31.4–37.4)
MCV RBC AUTO: 87 FL (ref 82–98)
MONOCYTES # BLD AUTO: 0.19 THOUSAND/ΜL (ref 0.17–1.22)
MONOCYTES NFR BLD AUTO: 2 % (ref 4–12)
NEUTROPHILS # BLD AUTO: 8.74 THOUSANDS/ΜL (ref 1.85–7.62)
NEUTS SEG NFR BLD AUTO: 80 % (ref 43–75)
NRBC BLD AUTO-RTO: 0 /100 WBCS
NT-PROBNP SERPL-MCNC: 18 PG/ML
PLATELET # BLD AUTO: 331 THOUSANDS/UL (ref 149–390)
PMV BLD AUTO: 10.2 FL (ref 8.9–12.7)
POTASSIUM SERPL-SCNC: 4.4 MMOL/L (ref 3.5–5.3)
PROT SERPL-MCNC: 7.4 G/DL (ref 6.4–8.2)
RBC # BLD AUTO: 4.41 MILLION/UL (ref 3.81–5.12)
SODIUM SERPL-SCNC: 136 MMOL/L (ref 136–145)
WBC # BLD AUTO: 10.77 THOUSAND/UL (ref 4.31–10.16)

## 2018-12-04 PROCEDURE — 85379 FIBRIN DEGRADATION QUANT: CPT | Performed by: NURSE PRACTITIONER

## 2018-12-04 PROCEDURE — 83880 ASSAY OF NATRIURETIC PEPTIDE: CPT | Performed by: NURSE PRACTITIONER

## 2018-12-04 PROCEDURE — 82550 ASSAY OF CK (CPK): CPT | Performed by: NURSE PRACTITIONER

## 2018-12-04 PROCEDURE — 36415 COLL VENOUS BLD VENIPUNCTURE: CPT | Performed by: NURSE PRACTITIONER

## 2018-12-04 PROCEDURE — 85025 COMPLETE CBC W/AUTO DIFF WBC: CPT | Performed by: NURSE PRACTITIONER

## 2018-12-04 PROCEDURE — 99215 OFFICE O/P EST HI 40 MIN: CPT | Performed by: NURSE PRACTITIONER

## 2018-12-04 PROCEDURE — 80053 COMPREHEN METABOLIC PANEL: CPT | Performed by: NURSE PRACTITIONER

## 2018-12-04 PROCEDURE — 93000 ELECTROCARDIOGRAM COMPLETE: CPT | Performed by: NURSE PRACTITIONER

## 2018-12-04 NOTE — PROGRESS NOTES
MEDICAL STUDENT  Inpatient Progress Note for TRAINING ONLY  Not Part of Legal Medical Record       Progress Note - Cynthia Monroe 38 y o  female MRN: [id number]    Unit/Bed#:  Encounter: [id number]      Assessment:  ***    Plan:  ***    Subjective:   ***    Objective:     Vitals: Blood pressure 122/70, pulse 82, temperature 98 7 °F (37 1 °C), temperature source Oral, weight 116 kg (256 lb 12 8 oz), SpO2 95 %, not currently breastfeeding  ,Body mass index is 45 49 kg/m²      [unfilled]    Physical Exam: {Exam, Complete:31931}     Invasive Devices          No matching active lines, drains, or airways          Lab, Imaging and other studies: {Results Review Statement:83639}  VTE Pharmacologic Prophylaxis: {Pharmacologic VTE Prophylaxis:050360726}  VTE Mechanical Prophylaxis: {Mechanical VTE Prophylaxis:35439}

## 2018-12-04 NOTE — PROGRESS NOTES
Assessment/Plan:    No problem-specific Assessment & Plan notes found for this encounter  Diagnoses and all orders for this visit:    Shortness of breath on exertion  -     D-dimer, quantitative; Future  -     CBC and differential; Future  -     Comprehensive metabolic panel; Future  -     NT-BNP PRO; Future  -     D-dimer, quantitative  -     CBC and differential  -     Comprehensive metabolic panel  -     NT-BNP PRO    Chest pain on exertion  -     POCT ECG  -     CK (with reflex to MB); Future  -     CBC and differential; Future  -     Comprehensive metabolic panel; Future  -     NT-BNP PRO; Future  -     CK (with reflex to MB)  -     CBC and differential  -     Comprehensive metabolic panel  -     NT-BNP PRO      Patient's EKG in office was NSR without ectopy, ST elevations, or prolonged QT interval   Case discussed with Dr Rodrigues  Will check labs now and follow up with Dr Jennifer Ham on Friday  Dr Jennifer Ham will monitor for her lab results tomorrow and this will dictate further testing, such as a chest CT scan, echocardiogram, etc  Reviewed red flag signs to call the office with or go to the Emergency Department with  Patient verbalizes understanding  Subjective:      Patient ID: Sahy Whitmore is a 38 y o  female  Patient presents for a follow up visit  She was seen for an acute visit on 11/30/18 by [place] for a 9 day history of flu-like symptoms  She was prescribed prednisone at that time, which she will complete tomorrow  Patient presents today with complaints of feeling short of breath that has worsened since last visit on 11/30/18  She reports that her other flu-like symptoms have resolved  She does not feel that the prednisone she was prescribed has helped her shortness of breath at all  A CXR was ordered on 11/30, which showed no significant findings  Patient states she can't take a deep breath and says if she tries, she gets light-headed and nauseous   She describes it as a squeezing feeling around the whole chest without any tightness or swelling in her throat  She admits to being slightly light-headed and clammy currently  She claims her shortness of breath occurs when exerting herself, going up stairs, and even washing her hair in the shower  When recently walking with her  in the mall, she had to stop and sit down multiple times in order to catch her breath  Even talking with prolonged sentences causes her to have to take a deep breath  Patient admits to having localized chest pain on exertion as well that she describes as stabbing and without radiation  Patient does report having a dry cough that occasionally wakes her up at night but denies a sore throat  She has also been constantly very fatigued and exhausted  Patient denies drinking water regularly and reports she is most likely dehydrated a lot of the time  Patient does not smoke  She has no family history of cardiac conditions that she is aware of  Patient denies recent travel or sitting for a prolonged period  She is not currently on any medications that would increase her risk of blood clots, nor does she have a history of a hypercoagulable state  Fatigue   This is a new problem  The current episode started in the past 7 days  The problem occurs constantly  The problem has been gradually worsening  Associated symptoms include anorexia, chest pain (stabbing pain ), congestion (slightly in morning), coughing (dry), diaphoresis, fatigue, nausea and swollen glands  Pertinent negatives include no abdominal pain, arthralgias, change in bowel habit, chills, fever, headaches, joint swelling, myalgias, neck pain, rash, sore throat, urinary symptoms, vertigo, visual change, vomiting or weakness  The symptoms are aggravated by exertion, eating, drinking, coughing and walking  Treatments tried: prednisone  The treatment provided no relief         The following portions of the patient's history were reviewed and updated as appropriate: allergies, current medications, past family history, past medical history, past social history, past surgical history and problem list     Review of Systems   Constitutional: Positive for diaphoresis and fatigue  Negative for chills and fever  HENT: Positive for congestion (slightly in morning)  Negative for sore throat  Respiratory: Positive for cough (dry)  Cardiovascular: Positive for chest pain (stabbing pain )  Negative for palpitations and leg swelling  Gastrointestinal: Positive for anorexia and nausea  Negative for abdominal pain, change in bowel habit and vomiting  Musculoskeletal: Negative for arthralgias, joint swelling, myalgias and neck pain  Skin: Negative for rash  Neurological: Negative for vertigo, weakness and headaches  Objective:      /70 (BP Location: Left arm, Patient Position: Sitting, Cuff Size: Large)   Pulse 82   Temp 98 7 °F (37 1 °C) (Oral)   Wt 116 kg (256 lb 12 8 oz)   SpO2 95%   BMI 45 49 kg/m²          Physical Exam   Constitutional: She is oriented to person, place, and time  She appears well-developed  Obese, mildly diaphoretic, appears flushed in the face  HENT:   Head: Normocephalic and atraumatic  Mouth/Throat: No oropharyngeal exudate  Eyes: Pupils are equal, round, and reactive to light  No scleral icterus  Neck: Normal range of motion  Neck supple  Cardiovascular: Normal rate, regular rhythm and normal heart sounds  Exam reveals no gallop and no friction rub  No murmur heard  Pulmonary/Chest: Breath sounds normal  She is in respiratory distress (Patient had to frequently pause in the middle of long sentences to take a deep breath)  She has no wheezes  She has no rales  Abdominal: Soft  Bowel sounds are normal    Musculoskeletal: Normal range of motion  She exhibits no edema  Lymphadenopathy:     She has cervical adenopathy  Neurological: She is alert and oriented to person, place, and time  Skin: Skin is warm  Mildly diaphoretic   Psychiatric: She has a normal mood and affect  Her behavior is normal  Judgment and thought content normal    Vitals reviewed

## 2018-12-04 NOTE — PATIENT INSTRUCTIONS
EKG is normal at this time  Labs to be done right now  Results will determine next steps  Return for follow up on Friday  Go to the ER if you develop worsening shortness of breath, chest pain, or other severe symptoms

## 2018-12-07 ENCOUNTER — OFFICE VISIT (OUTPATIENT)
Dept: INTERNAL MEDICINE CLINIC | Facility: CLINIC | Age: 43
End: 2018-12-07
Payer: MEDICARE

## 2018-12-07 VITALS
SYSTOLIC BLOOD PRESSURE: 118 MMHG | HEART RATE: 88 BPM | DIASTOLIC BLOOD PRESSURE: 62 MMHG | WEIGHT: 256 LBS | BODY MASS INDEX: 45.36 KG/M2 | OXYGEN SATURATION: 97 % | HEIGHT: 63 IN | TEMPERATURE: 98.3 F

## 2018-12-07 DIAGNOSIS — R06.02 SHORTNESS OF BREATH: Primary | ICD-10-CM

## 2018-12-07 PROCEDURE — 99213 OFFICE O/P EST LOW 20 MIN: CPT | Performed by: INTERNAL MEDICINE

## 2018-12-07 NOTE — PROGRESS NOTES
Assessment/Plan:    Shortness of breath  Will schedule the patient for transthoracic echocardiogram to evaluate for possible etiologies to her exertional dyspnea  Diagnoses and all orders for this visit:    Shortness of breath  -     Echo complete with contrast if indicated; Future    BMI 40 0-44 9, adult (HCC)          Subjective:      Patient ID: Sharlene Logan is a 38 y o  female  The patient presents for follow-up visit regarding recent complaints of severe exertional dyspnea  The patient states that her symptoms are slightly improved but she still becomes very short of breath on minimal activity such as even taking a shower  She has occasional episodes of paroxysmal nocturnal dyspnea which usually occur if she is lying on either her left or right side  If she is lying on her back she does not experience any PND  She denies any diaphoresis, there is no history of coughing there is no history of chest discomfort  She does experience occasional palpitations but no associated dyspnea, lightheadedness or near fainting  She has had no fevers  No urinary complaints or GI complaints  Her labs are reviewed  Her white count was mildly elevated but that was most likely secondary to recent treatment with prednisone  Metabolic panel was within normal limits  Liver enzymes were normal and total bilirubin was low    CK was normal   D-dimer was within normal range and NT proBNP was also normal   A chest x-ray done previously was normal         Family History   Problem Relation Age of Onset    Hyperthyroidism Mother     Thyroid disease Mother     Depression Father     Hypertension Father     Obesity Father     Other Paternal Grandmother         Cardiac Disorder    Dementia Paternal Grandmother     Hypertension Paternal Grandmother     Diabetes Paternal Grandmother     Hypertension Paternal Grandfather     Other Paternal Grandfather         Cardiac Disorder    Diabetes Paternal Lorri Plaster Schizophrenia Other     Schizophrenia Other     Suicidality Other     Completed Suicide  Other     Breast cancer Other     Hypertension Paternal Aunt     Stroke Neg Hx      Social History     Social History    Marital status: /Civil Union     Spouse name: N/A    Number of children: 2    Years of education: bachelor's degree     Occupational History    on disability      Social History Main Topics    Smoking status: Former Smoker     Quit date: 3/1/2015    Smokeless tobacco: Never Used      Comment: Quit    Alcohol use Yes      Comment: Social alcohol    Drug use: No      Comment: Past cocaine and marijuana use for 2 years until 2001  Also tried LSD years ago   No current recreational drug use    Sexual activity: Yes     Partners: Male     Birth control/ protection: Male Sterilization     Other Topics Concern    Not on file     Social History Narrative    Education: bachelor's degree in psychology; currently in master's degree program in Social Work at [email or web address] - on medical leave currently    Learning Disabilities: none    Marital History:  (second marriage)    Children: 2 sons    Living Arrangement: lives in home with , 2 sons and stepdaughter    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[employer], currently unemployed, on permanent disability    Functioning Relationships: good support system,  is supportive    Legal History: none     History: None     Past Medical History:   Diagnosis Date    Acute pancreatitis     Last Assessed: 12Apr2016; due to gallstones (removed)    Anemia     Anxiety     Arthralgia     Concussion     Last Assessed: 11Oct2012    Endometriosis     Esophageal reflux     Familial combined hyperlipidemia     Last Assessed: [id number]    Gastropathy     Head injury     History of sinusitis     Irregular heart beat     Migraine     Last Assessed: [id number]    Paroxysmal supraventricular tachycardia ([facility] )  Peptic ulcer disease     Spontaneous      Substance abuse [facility]     Suicide attempt [facility]     UTI (urinary tract infection)     Vitamin D deficiency        Current Outpatient Prescriptions:     ARIPiprazole (ABILIFY) 15 mg tablet, Take 1 tablet (15 mg total) by mouth daily at bedtime for 90 days, Disp: 30 tablet, Rfl: 2    atoMOXetine (STRATTERA) 40 mg capsule, Take 1 capsule (40 mg total) by mouth daily for 90 days, Disp: 30 capsule, Rfl: 2    benztropine (COGENTIN) 0 5 mg tablet, Take 1 tablet (0 5 mg total) by mouth 2 (two) times a day as needed for tremors for up to 90 days, Disp: 60 tablet, Rfl: 2    carBAMazepine (TEGretol) 200 mg tablet, Take 1 tablet (200 mg total) by mouth 2 (two) times a day for 90 days, Disp: 60 tablet, Rfl: 2    clonazePAM (KlonoPIN) 0 5 mg tablet, Take 1 tablet (0 5 mg total) by mouth 3 (three) times a day for 90 days, Disp: 90 tablet, Rfl: 2    lamoTRIgine (LaMICtal) 150 MG tablet, Take 2 tablets (300 mg total) by mouth daily at bedtime for 90 days, Disp: 60 tablet, Rfl: 2    traZODone (DESYREL) 50 mg tablet, Take 1 tablet (50 mg total) by mouth daily at bedtime as needed for sleep for up to 90 days, Disp: 30 tablet, Rfl: 2    Vitamin D, Cholecalciferol, 1000 units CAPS, Take 2,000 Units by mouth, Disp: , Rfl:   Allergies   Allergen Reactions    Geodon [Ziprasidone]      Nystagmus    Quetiapine      Other reaction(s): Other (See Comments)  Per pt low blood pressure and fainting    Other      Seasonal allergies     Past Surgical History:   Procedure Laterality Date     SECTION      FOREARM SURGERY Left     INDUCED       x3    LAPAROSCOPIC CHOLECYSTECTOMY      Last Assessed: 2016    LAPAROSCOPY      Exploratory    TONSILLECTOMY      Onset:          Review of Systems   Constitutional: Positive for activity change and fatigue  Negative for chills, diaphoresis and fever  HENT: Negative  Eyes: Negative      Respiratory: Positive for shortness of breath (On minimal exertion)  Negative for wheezing and stridor  Cardiovascular: Negative  Gastrointestinal: Negative  Genitourinary: Negative  Neurological: Negative  Psychiatric/Behavioral:        Compensated bipolar depression         Objective:      /62 (BP Location: Left arm, Patient Position: Sitting, Cuff Size: Large)   Pulse 88   Temp 98 3 °F (36 8 °C) (Oral)   Ht 5' 3" (1 6 m)   Wt 116 kg (256 lb)   SpO2 97% Comment: room air  BMI 45 35 kg/m²          Physical Exam   Constitutional: She is oriented to person, place, and time  She appears well-developed  No distress  Morbidly obese, well-hydrated  HENT:   Head: Normocephalic and atraumatic  Eyes: Conjunctivae are normal    Neck: No JVD present  No tracheal deviation present  Cardiovascular: Normal rate, regular rhythm and normal heart sounds  No murmur heard  Pulmonary/Chest: Breath sounds normal  No respiratory distress  She has no wheezes  She has no rales  Abdominal: She exhibits no distension  Musculoskeletal: Normal range of motion  She exhibits no edema  Lymphadenopathy:     She has no cervical adenopathy  Neurological: She is alert and oriented to person, place, and time  No focal motor deficits are appreciated  Skin: Skin is warm and dry  She is not diaphoretic  Psychiatric: She has a normal mood and affect  Thought content normal    Vitals reviewed

## 2018-12-07 NOTE — ASSESSMENT & PLAN NOTE
Will schedule the patient for transthoracic echocardiogram to evaluate for possible etiologies to her exertional dyspnea

## 2018-12-11 ENCOUNTER — HOSPITAL ENCOUNTER (OUTPATIENT)
Dept: NON INVASIVE DIAGNOSTICS | Facility: CLINIC | Age: 43
Discharge: HOME | End: 2018-12-11
Payer: MEDICARE

## 2018-12-11 DIAGNOSIS — R06.02 SHORTNESS OF BREATH: ICD-10-CM

## 2018-12-11 PROCEDURE — 93306 TTE W/DOPPLER COMPLETE: CPT

## 2018-12-11 PROCEDURE — 93306 TTE W/DOPPLER COMPLETE: CPT | Performed by: INTERNAL MEDICINE

## 2018-12-12 ENCOUNTER — DOCUMENTATION (OUTPATIENT)
Dept: PSYCHIATRY | Facility: CLINIC | Age: 43
End: 2018-12-12

## 2018-12-12 NOTE — PROGRESS NOTES
Appointment 12/6/2018 was cancelled due to Provider being out of the office, Will RS at a later time

## 2018-12-24 ENCOUNTER — OFFICE VISIT (OUTPATIENT)
Dept: PSYCHIATRY | Facility: CLINIC | Age: 43
End: 2018-12-24
Payer: MEDICARE

## 2018-12-24 VITALS
DIASTOLIC BLOOD PRESSURE: 88 MMHG | BODY MASS INDEX: 45.36 KG/M2 | HEIGHT: 63 IN | WEIGHT: 256 LBS | SYSTOLIC BLOOD PRESSURE: 138 MMHG | HEART RATE: 69 BPM

## 2018-12-24 DIAGNOSIS — F31.76 BIPOLAR I DISORDER, MOST RECENT EPISODE DEPRESSED, IN FULL REMISSION (HCC): Primary | Chronic | ICD-10-CM

## 2018-12-24 DIAGNOSIS — Z51.81 THERAPEUTIC DRUG MONITORING: Chronic | ICD-10-CM

## 2018-12-24 DIAGNOSIS — F41.1 GAD (GENERALIZED ANXIETY DISORDER): Chronic | ICD-10-CM

## 2018-12-24 DIAGNOSIS — F90.0 ADHD, PREDOMINANTLY INATTENTIVE TYPE: Chronic | ICD-10-CM

## 2018-12-24 DIAGNOSIS — G47.09 OTHER INSOMNIA: Chronic | ICD-10-CM

## 2018-12-24 DIAGNOSIS — G25.9 EXTRAPYRAMIDAL REACTION: ICD-10-CM

## 2018-12-24 PROCEDURE — 90833 PSYTX W PT W E/M 30 MIN: CPT | Performed by: PSYCHIATRY & NEUROLOGY

## 2018-12-24 PROCEDURE — 99213 OFFICE O/P EST LOW 20 MIN: CPT | Performed by: PSYCHIATRY & NEUROLOGY

## 2018-12-24 RX ORDER — CARBAMAZEPINE 200 MG/1
200 TABLET ORAL 2 TIMES DAILY
Qty: 60 TABLET | Refills: 3 | Status: SHIPPED | OUTPATIENT
Start: 2018-12-24 | End: 2019-03-25 | Stop reason: SDUPTHER

## 2018-12-24 RX ORDER — LAMOTRIGINE 150 MG/1
300 TABLET ORAL
Qty: 60 TABLET | Refills: 3 | Status: SHIPPED | OUTPATIENT
Start: 2018-12-24 | End: 2019-02-07 | Stop reason: SDUPTHER

## 2018-12-24 RX ORDER — CLONAZEPAM 0.5 MG/1
0.5 TABLET ORAL 3 TIMES DAILY
Qty: 90 TABLET | Refills: 3 | Status: SHIPPED | OUTPATIENT
Start: 2019-01-03 | End: 2019-02-07 | Stop reason: SDUPTHER

## 2018-12-24 RX ORDER — ARIPIPRAZOLE 15 MG/1
15 TABLET ORAL
Qty: 30 TABLET | Refills: 3 | Status: SHIPPED | OUTPATIENT
Start: 2018-12-24 | End: 2019-03-25 | Stop reason: SDUPTHER

## 2018-12-24 RX ORDER — ATOMOXETINE 40 MG/1
40 CAPSULE ORAL DAILY
Qty: 30 CAPSULE | Refills: 3 | Status: SHIPPED | OUTPATIENT
Start: 2018-12-24 | End: 2019-03-25 | Stop reason: SDUPTHER
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
## 2019-01-08 NOTE — TELEPHONE ENCOUNTER
The strattara 40mg is no longer covered under her insurance its 200 00 a month and she can not afford it   Can you give her another medication for her adhd

## 2019-01-08 NOTE — TELEPHONE ENCOUNTER
There are no alternatives as stimulants can destabilize her mood   Please ask Wilber Hadley to send Prior Authorization request

## 2019-01-10 ENCOUNTER — TELEPHONE (OUTPATIENT)
Dept: PSYCHIATRY | Facility: CLINIC | Age: 44
End: 2019-01-10

## 2019-01-10 NOTE — TELEPHONE ENCOUNTER
Leni Guadarrama LM: she spoke with Ryan Hawkins and there is no high deductible - the cost of the medication is $138

## 2019-01-10 NOTE — TELEPHONE ENCOUNTER
Nursing called [facility] and obtained insurance information in case a prior authorization is needed for Strattera  [facility] pharmacist relayed that the cost of this medication, $138 00 is most likely related to a beginning of the year deductible  Nursing called patient and realyed this information  Patient will be confirming with her insurance and calling back if a prior [place] is needed

## 2019-01-10 NOTE — TELEPHONE ENCOUNTER
Nursing called insurance and spoke to a representative that stated the patient is falling in the "donut hole" of the plan and is currently at a Tier 4  Nursing asked for a tier reduction for patient  Clinical information provided to representative  There is a 72 hour period for outcome of request  Patient was called and information was relayed that once outcome of request is received patient can call [phone] to find out final cost of medication  Case number [id number]

## 2019-01-11 NOTE — TELEPHONE ENCOUNTER
Spoke to pharmacist, Mr Danny Levi at [employer] re: Margi's request for tier reduction for Atomoxetine  He stated that information is needed to consider this tier reduction  for tried and failed medications from the following list: Adderall, Focalin, Ritalin  Since none of these medications were documented as tried/failed he stated that [facility] tier appeals would require MD information why Daylin cannot be prescribed one of the above medications  Case was closed as it will  Sun  19 but new case can be opened if any of the above information is obtained and supplied to [employer]  For Dr Radha Mittal review

## 2019-01-11 NOTE — TELEPHONE ENCOUNTER
Ivory from [facility] called with additional questions regarding Denny Leyden  She can be reached at [phone]

## 2019-01-12 NOTE — TELEPHONE ENCOUNTER
Please notify the insurance that stimulant medications like Ritalin, Focalin, Concerta and Adderall cannot be used sarah beth this patient due to her history of bipolar disorder with unstable mood and recent inpatient admission  Stimulant medications are likely to destabilize further Nat's mood leading to potential inpatient admission  Also, she has a history of cocaine and esctasy use and controlled stimulant medications are contraindicated for patients with risk of potential abuse

## 2019-01-15 ENCOUNTER — TELEPHONE (OUTPATIENT)
Dept: PSYCHIATRY | Facility: CLINIC | Age: 44
End: 2019-01-15

## 2019-01-15 NOTE — TELEPHONE ENCOUNTER
Spoke with [facility] pharmacist Vikram Alvarado this AM and gave information for appeal of Tier reduction of Atomoxetine capsules per Dr Talbot Eastern  Reduction of medication cost was made from Tier 4 to Tier 2  1/1/19 to 1/15/20 with approval # [id number] and cost now will be $14 00 for month 30 for 30 days  Fax to confirm will be sent today  Nursing to notify Cait Tim of same  For Dr Stef tovar

## 2019-01-15 NOTE — TELEPHONE ENCOUNTER
Called and left  requesting Bhanu Tracy return call to inform her that approval for Tier reduction has been approved from Tier 4 to Tier 2  Waiting for return call

## 2019-01-16 ENCOUNTER — TELEPHONE (OUTPATIENT)
Dept: PSYCHIATRY | Facility: CLINIC | Age: 44
End: 2019-01-16

## 2019-01-16 NOTE — TELEPHONE ENCOUNTER
Called Margi and left TERRY on mobile phone requesting return call to inform of tier reduction  (2) assistive person
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
## 2019-02-06 ENCOUNTER — OFFICE VISIT (OUTPATIENT)
Dept: BEHAVIORAL/MENTAL HEALTH CLINIC | Facility: CLINIC | Age: 44
End: 2019-02-06
Payer: MEDICARE

## 2019-02-06 DIAGNOSIS — F90.0 ADHD, PREDOMINANTLY INATTENTIVE TYPE: Primary | Chronic | ICD-10-CM

## 2019-02-06 DIAGNOSIS — G47.09 OTHER INSOMNIA: Chronic | ICD-10-CM

## 2019-02-06 DIAGNOSIS — F31.63 BIPOLAR 1 DISORDER, MIXED, SEVERE (HCC): ICD-10-CM

## 2019-02-06 DIAGNOSIS — F41.1 GAD (GENERALIZED ANXIETY DISORDER): Chronic | ICD-10-CM

## 2019-02-06 PROCEDURE — 90834 PSYTX W PT 45 MINUTES: CPT | Performed by: SOCIAL WORKER

## 2019-02-06 NOTE — PSYCH
Nimisha Breaux  1975       Date of Initial Treatment Plan: 02/06/2019   Date of Current Treatment Plan: 02/06/19    Treatment Plan Number Initial    Strengths/Personal Resources for Self Care: I am driven, I am a good parent and partner,Intelligent    Diagnosis:   Bipolar 1 Mixed    Area of Needs: I need to decrease my feelings of worthlessness and I need to discover my purpose again  I need to find a hobby or outlet and view this time away from school as a needed break  Long Term Goal 1: I need to discuss my feelings of worthlessness and rediscover my purpose again    Target Date:06/06/2019  Completion Date:TBD          Short Term Objectives for Goal 1: will work on helping her realize most people have several purposes and to celebrate areas she is doing well in    Long Term Goal 2: To find some hobbies or outlets that will help her see her break from school as needed and not a failure  Target Date: 06/06/2019  Completion Date: TBD    Short Term Objectives for Goal 2: will discuss these options in therapy including support groups         Long Term Goal # 3:  n/a    Target Date: n/a  Completion Date: n/a    Short Term Objectives for Goal 3:     GOAL 1: Modality: Individual 2x per month   Completion Date TBD    GOAL 2: Modality: Individual 2x per month   Completion Date TBD     GOAL 3: Modality: Individual 2x per month   Completion Date TBD      Behavioral Health Treatment Plan  Luke: Diagnosis and Treatment Plan explained to Inna Smith relates understanding diagnosis and is agreeable to Treatment Plan         Client Comments : Please share your thoughts, feelings, need and/or experiences regarding your treatment plan: __________________________________________________________________    __________________________________________________________________    __________________________________________________________________    __________________________________________________________________    _______________________________________                Patient signature, Date Time: __________________________________________             Physician cosigner signature, Date, Time: ________________________________

## 2019-02-06 NOTE — PSYCH
Psychotherapy Provided: Individual Psychotherapy 45 minutes     Length of time in session: 45 minutes, follow up in 2 week    Goals addressed in session: Goal 1, Goal 2 and Goal 3      Pain:      moderate to severe    0    Current suicide risk : Low     Data: I was in inpatient and partial  I feel partial was not overly effective  I am use to CBT not DBT  I tried part time work and got overly stressed and overwhelmed  I am highly anxious and depressed  I am not suicidal  I had to quit my graduate MSW program  I may return in September of 2019  She was somewhat annoyed about her hospitalization however she agrees was probably needed  Her  is supportive  She is not suicidal but feels totally worthless and she feels she has been this way since discharge from partial  She is highly depressed and anxious  She is open to partial hospital just not here  She is highly driven and intelligent but she is having cognitive dissonance over where she is at clinically  She went 9 years doing well and now she is upset she is so out of control emotionally and feeling unproductive  She is upset she is in this cycle of nothingness  She does not feel her medications are working  She evaluates her totality as a person based on she had to take a break from graduate school rather than looking that she is a good mom, in a good marriage and per her keeps a nice home and is a great cook  Assessment: I am helping her via her goals to see that she as a person has several purposes not just one her career  She is doing well with her children her marriage and is managing her home well  She also realizes and is being encouraged to view the break from school as much needed break and to view it as being helpful to get her to where she wants to go  She claimed she felt so much better at the end of the session  Plan: Continue to help her skill build in distress tolerance       Behavioral Health Treatment Plan [facility]: Diagnosis and Treatment Plan explained to Abigail Acharya relates understanding diagnosis and is agreeable to Treatment Plan   Yes

## 2019-02-07 ENCOUNTER — OFFICE VISIT (OUTPATIENT)
Dept: PSYCHIATRY | Facility: CLINIC | Age: 44
End: 2019-02-07
Payer: MEDICARE

## 2019-02-07 VITALS
SYSTOLIC BLOOD PRESSURE: 121 MMHG | DIASTOLIC BLOOD PRESSURE: 81 MMHG | WEIGHT: 265 LBS | BODY MASS INDEX: 46.95 KG/M2 | HEART RATE: 90 BPM | HEIGHT: 63 IN

## 2019-02-07 DIAGNOSIS — F41.1 GAD (GENERALIZED ANXIETY DISORDER): Chronic | ICD-10-CM

## 2019-02-07 DIAGNOSIS — Z51.81 THERAPEUTIC DRUG MONITORING: Chronic | ICD-10-CM

## 2019-02-07 DIAGNOSIS — F90.0 ADHD, PREDOMINANTLY INATTENTIVE TYPE: Chronic | ICD-10-CM

## 2019-02-07 DIAGNOSIS — F31.32 BIPOLAR I DISORDER, MOST RECENT EPISODE DEPRESSED, MODERATE (HCC): Primary | Chronic | ICD-10-CM

## 2019-02-07 PROBLEM — G47.09 OTHER INSOMNIA: Chronic | Status: RESOLVED | Noted: 2018-10-05 | Resolved: 2019-02-07

## 2019-02-07 PROCEDURE — 99213 OFFICE O/P EST LOW 20 MIN: CPT | Performed by: PSYCHIATRY & NEUROLOGY

## 2019-02-07 PROCEDURE — 90833 PSYTX W PT W E/M 30 MIN: CPT | Performed by: PSYCHIATRY & NEUROLOGY

## 2019-02-07 RX ORDER — LAMOTRIGINE 200 MG/1
400 TABLET ORAL
Qty: 60 TABLET | Refills: 3 | Status: SHIPPED | OUTPATIENT
Start: 2019-02-07 | End: 2019-05-24 | Stop reason: SDUPTHER

## 2019-02-07 RX ORDER — CLONAZEPAM 1 MG/1
1 TABLET ORAL 3 TIMES DAILY
Qty: 90 TABLET | Refills: 3 | Status: SHIPPED | OUTPATIENT
Start: 2019-02-07 | End: 2019-05-24 | Stop reason: SDUPTHER

## 2019-02-08 ENCOUNTER — APPOINTMENT (OUTPATIENT)
Dept: LAB | Age: 44
End: 2019-02-08
Payer: MEDICARE

## 2019-02-08 DIAGNOSIS — Z51.81 THERAPEUTIC DRUG MONITORING: Chronic | ICD-10-CM

## 2019-02-08 DIAGNOSIS — F31.32 BIPOLAR I DISORDER, MOST RECENT EPISODE DEPRESSED, MODERATE (HCC): Chronic | ICD-10-CM

## 2019-02-08 LAB
ALBUMIN SERPL BCP-MCNC: 3.6 G/DL (ref 3.5–5)
ALP SERPL-CCNC: 105 U/L (ref 46–116)
ALT SERPL W P-5'-P-CCNC: 36 U/L (ref 12–78)
ANION GAP SERPL CALCULATED.3IONS-SCNC: 5 MMOL/L (ref 4–13)
AST SERPL W P-5'-P-CCNC: 12 U/L (ref 5–45)
BASOPHILS # BLD AUTO: 0.07 THOUSANDS/ΜL (ref 0–0.1)
BASOPHILS NFR BLD AUTO: 1 % (ref 0–1)
BILIRUB SERPL-MCNC: 0.23 MG/DL (ref 0.2–1)
BUN SERPL-MCNC: 13 MG/DL (ref 5–25)
CALCIUM SERPL-MCNC: 9 MG/DL (ref 8.3–10.1)
CARBAMAZEPINE SERPL-MCNC: 6.4 UG/ML (ref 4–12)
CHLORIDE SERPL-SCNC: 104 MMOL/L (ref 100–108)
CO2 SERPL-SCNC: 27 MMOL/L (ref 21–32)
CREAT SERPL-MCNC: 0.88 MG/DL (ref 0.6–1.3)
EOSINOPHIL # BLD AUTO: 0.14 THOUSAND/ΜL (ref 0–0.61)
EOSINOPHIL NFR BLD AUTO: 2 % (ref 0–6)
ERYTHROCYTE [DISTWIDTH] IN BLOOD BY AUTOMATED COUNT: 12.9 % (ref 11.6–15.1)
GFR SERPL CREATININE-BSD FRML MDRD: 82 ML/MIN/1.73SQ M
GLUCOSE P FAST SERPL-MCNC: 94 MG/DL (ref 65–99)
HCT VFR BLD AUTO: 38.1 % (ref 34.8–46.1)
HGB BLD-MCNC: 12.2 G/DL (ref 11.5–15.4)
IMM GRANULOCYTES # BLD AUTO: 0.03 THOUSAND/UL (ref 0–0.2)
IMM GRANULOCYTES NFR BLD AUTO: 0 % (ref 0–2)
LYMPHOCYTES # BLD AUTO: 2.46 THOUSANDS/ΜL (ref 0.6–4.47)
LYMPHOCYTES NFR BLD AUTO: 33 % (ref 14–44)
MCH RBC QN AUTO: 28.7 PG (ref 26.8–34.3)
MCHC RBC AUTO-ENTMCNC: 32 G/DL (ref 31.4–37.4)
MCV RBC AUTO: 90 FL (ref 82–98)
MONOCYTES # BLD AUTO: 0.57 THOUSAND/ΜL (ref 0.17–1.22)
MONOCYTES NFR BLD AUTO: 8 % (ref 4–12)
NEUTROPHILS # BLD AUTO: 4.3 THOUSANDS/ΜL (ref 1.85–7.62)
NEUTS SEG NFR BLD AUTO: 56 % (ref 43–75)
NRBC BLD AUTO-RTO: 0 /100 WBCS
PLATELET # BLD AUTO: 358 THOUSANDS/UL (ref 149–390)
PMV BLD AUTO: 10.2 FL (ref 8.9–12.7)
POTASSIUM SERPL-SCNC: 4.5 MMOL/L (ref 3.5–5.3)
PROT SERPL-MCNC: 7.6 G/DL (ref 6.4–8.2)
RBC # BLD AUTO: 4.25 MILLION/UL (ref 3.81–5.12)
SODIUM SERPL-SCNC: 136 MMOL/L (ref 136–145)
WBC # BLD AUTO: 7.57 THOUSAND/UL (ref 4.31–10.16)

## 2019-02-08 PROCEDURE — 36415 COLL VENOUS BLD VENIPUNCTURE: CPT

## 2019-02-08 PROCEDURE — 80156 ASSAY CARBAMAZEPINE TOTAL: CPT

## 2019-02-08 PROCEDURE — 85025 COMPLETE CBC W/AUTO DIFF WBC: CPT

## 2019-02-08 PROCEDURE — 80053 COMPREHEN METABOLIC PANEL: CPT

## 2019-03-06 ENCOUNTER — OFFICE VISIT (OUTPATIENT)
Dept: BEHAVIORAL/MENTAL HEALTH CLINIC | Facility: CLINIC | Age: 44
End: 2019-03-06
Payer: MEDICARE

## 2019-03-06 DIAGNOSIS — F31.32 BIPOLAR I DISORDER, MOST RECENT EPISODE DEPRESSED, MODERATE (HCC): Chronic | ICD-10-CM

## 2019-03-06 DIAGNOSIS — F90.0 ADHD, PREDOMINANTLY INATTENTIVE TYPE: Primary | Chronic | ICD-10-CM

## 2019-03-06 DIAGNOSIS — F41.1 GAD (GENERALIZED ANXIETY DISORDER): Chronic | ICD-10-CM

## 2019-03-06 PROCEDURE — 90834 PSYTX W PT 45 MINUTES: CPT | Performed by: SOCIAL WORKER

## 2019-03-06 NOTE — PSYCH
Psychotherapy Provided: Individual Psychotherapy 45 minutes     Length of time in session: 45 minutes, follow up in 2 week    Goals addressed in session: Goal 1 and Goal 2     Pain:      moderate to severe    0    Current suicide risk : Low     Data: I feel so much better since the last session and Dr Rico Seals did adjust the medications and thanks to you for talking to her  I am now doing an online [facility] program thru [employer]  I eventually want to get my PHD in school psychology  Her mood is better and more stable  Regarding goal 1 she no longer feels worthless and she has found her purpose again  Goal 2 of finding outlets is being completed via school and her son's baseball  We discussed mindfulness, distress tolerance and radical acceptance  Assessment: The client is in a good emotional space and feels psychologically better than she was  Her affect is brighter and she is now able to visualize a positive future  Through reframing and mindfulness I was able to have her look at things in a different fashion that she never imagined and she said I never thought of it that way  She felt the strategies were helpful  Plan: Continue to help her skill build in distress tolerance  Behavioral Health Treatment Plan [facility]: Diagnosis and Treatment Plan explained to Estefany Scott relates understanding diagnosis and is agreeable to Treatment Plan   Yes

## 2019-03-20 NOTE — PSYCH
MEDICATION MANAGEMENT NOTE        formerly [facility]      Name and Date of Birth:  Shay Whimtore 41 y o  1975 MRN: [id number]    Date of Visit: March 25, 2019    SUBJECTIVE:    Lizy Huang is seen today for a follow up for bipolar disorder, generalized anxiety disorder and ADHD  She has improved since the last visit  She states that mood has been more stable, denies any significant depressive symptoms  She reports that anxiety symptoms are more controlled  She feels that not working helps with improvement in anxiety symptoms "anxiety is still there sometimes, but much better"  She denies any suicidal ideation, intent or plan at present; denies any homicidal ideation, intent or plan at present  She has no auditory hallucinations, denies any visual hallucinations, has no delusional thinking  She denies any side effects from current psychiatric medications  No rash noted or reported  Dylan LAWRENCE Appetite Changes and Sleep:     She reports normal sleep, normal appetite, recent weight loss (1 lbs), normal energy level    Review Of Systems:      Constitutional recent weight loss (1 lbs)   ENT negative   Cardiovascular negative   Respiratory negative   Gastrointestinal negative   Genitourinary negative   Musculoskeletal negative   Integumentary negative   Neurological negative   Endocrine negative   Other Symptoms none       Past Psychiatric History:      Past Inpatient Psychiatric Treatment:   4 past inpatient psychiatric admissions at [facility] 10/2018, [facility] and [facility] in [place]  Past Outpatient Psychiatric Treatment:    In outpatient treatment at [address] [place] [address] [address] since 2017    Past Suicide Attempts: yes, 6 attempts by overdose, jumping out of a parking garage and     driving car into another car  Last attempt was in 2006  Past Violent Behavior: no  Past Psychiatric Medication Trials: Zoloft, Paxil, Lexapro, Effexor, Trazodone, Depakote, Tegretol, Lithium, Lamictal, Trileptal, Neurontin, Risperdal, Abilify, Seroquel, Zyprexa, Geodon, Latuda, Klonopin, ativan, Xanax, Valium and Strattera    Past Medical History:    Past Medical History:   Diagnosis Date    Acute pancreatitis     Last Assessed: 2016; due to gallstones (removed)    Anemia     Anxiety     Arthralgia     Concussion     Last Assessed: 2012    Endometriosis     Esophageal reflux     Familial combined hyperlipidemia     Last Assessed: 2013    Gastropathy     Head injury     History of sinusitis     Irregular heart beat     Migraine     Last Assessed: 2012    Paroxysmal supraventricular tachycardia ([facility] )     Peptic ulcer disease     Spontaneous      Substance abuse ([facility] )     Suicide attempt ([facility] )     UTI (urinary tract infection)     Vitamin D deficiency      Past Medical History Pertinent Negatives:   Diagnosis Date Noted    Addiction to drug ([facility] ) 2018    Adjustment disorder 2018    Alcohol abuse 2018    Alcoholism ([facility] ) 2018    Anorexia nervosa 2018    Autism spectrum disorder 2018    Borderline personality disorder ([facility] ) 2018    Bulimia nervosa 2018    Chronic kidney disease 2018    Chronic pain disorder 2018    Cognitive impairment 2018    CVA (cerebral vascular accident) ([facility] ) 2018    Dementia 2018    Disease of thyroid gland 2018    Hallucination 2018    Heart disease 2018    History of electroconvulsive therapy 2018    HIV disease ([facility] ) 2018    Impulse control disorder 2018    Liver disease 2018    Memory loss 2018    Myocardial infarction ([facility] ) 2018    Obsessive-compulsive disorder 2018    Oppositional defiant disorder 2018    Panic attack 2018    Panic disorder 2018    Peripheral neuropathy 2018    Psychosis ([facility] ) 2018    PTSD (post-traumatic stress disorder) 2018    Schizoaffective disorder ([facility] ) 2018    Schizophrenia ([place] [address] ) 2018    Seizures ([place] [address] )     Self-injurious behavior 2018    Sleep difficulties 2018    Violence, history of 2018    Withdrawal symptoms, alcohol ([place] [address] ) 2018    Withdrawal symptoms, drug or narcotic ([place] [address] ) 2018     Past Surgical History:   Procedure Laterality Date     SECTION      FOREARM SURGERY Left     INDUCED       x3    LAPAROSCOPIC CHOLECYSTECTOMY      Last Assessed: 2016    LAPAROSCOPY      Exploratory    TONSILLECTOMY      Onset: [id number]     Allergies   Allergen Reactions    Geodon [Ziprasidone]      Nystagmus    Quetiapine      Other reaction(s): Other (See Comments)  Per pt low blood pressure and fainting    Other      Seasonal allergies       Substance Abuse History:    Social History     Substance and Sexual Activity   Alcohol Use Not Currently    Frequency: Never    Binge frequency: Never     Social History     Substance and Sexual Activity   Drug Use No    Comment: Past cocaine and marijuana use for 2 years until   Also tried LSD years ago  No current recreational drug use       Social History:    Social History     Socioeconomic History    Marital status: /Civil Union     Spouse name: Not on file    Number of children: 2    Years of education: bachelor's degree    Highest education level:  Bachelor's degree (e g , BA, AB, BS)   Occupational History    Occupation: on disability   Social Needs    Financial resource strain: Not hard at all   Supercell insecurity:     Worry: Never true     Inability: Never true   [employer] needs:     Medical: No     Non-medical: No   Tobacco Use    Smoking status: Former Smoker     Last attempt to quit: 3/1/2015     Years since quittin 0    Smokeless tobacco: Never Used    Tobacco comment: Quit   Substance and Sexual Activity    Alcohol use: Not Currently     Frequency: Never     Binge frequency: Never    Drug use: No     Comment: Past cocaine and marijuana use for 2 years until 2001  Also tried LSD years ago  No current recreational drug use    Sexual activity: Yes     Partners: Male     Birth control/protection: Male Sterilization   Lifestyle    Physical activity:     Days per week: 2 days     Minutes per session: 40 min    Stress:  To some extent   Relationships    Social connections:     Talks on phone: More than three times a week     Gets together: More than three times a week     Attends Confucianism service: Never     Active member of club or organization: No     Attends meetings of clubs or organizations: Never     Relationship status:     Intimate partner violence:     Fear of current or ex partner: No     Emotionally abused: No     Physically abused: No     Forced sexual activity: No   Other Topics Concern    Not on file   Social History Narrative    Education: bachelor's degree in psychology; currently in [employer] degree program in Social Work at [employer] - on medical leave currently    Learning Disabilities: none    Marital History:  (second marriage)    Children: 2 sons    Living Arrangement: lives in home with , 2 sons and stepdaughter    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[employer], currently unemployed, on permanent disability    Functioning Relationships: good support system,  is supportive    Legal History: none     History: None       Family Psychiatric History:     Family History   Problem Relation Age of Onset    Hyperthyroidism Mother     Thyroid disease Mother     Depression Father     Hypertension Father     Obesity Father     Other Paternal Grandmother         Cardiac Disorder    Dementia Paternal Grandmother     Hypertension Paternal Grandmother     Diabetes Paternal Grandmother     Hypertension Paternal Dusty Lather Other Paternal Grandfather         Cardiac Disorder    Diabetes Paternal Grandfather     Schizophrenia Other     Schizophrenia Other     Suicidality Other     Completed Suicide  Other     Breast cancer Other     Hypertension Paternal Aunt     Stroke Neg Hx        History Review:  The following portions of the patient's history were reviewed and updated as appropriate: allergies, current medications, past family history, past medical history, past social history, past surgical history and problem list          OBJECTIVE:     Vital signs in last 24 hours:    Vitals:    03/25/19 1413   BP: 134/90   Pulse: 91   Weight: 120 kg (264 lb)   Height: 5' 2" (1 575 m)       Mental Status Evaluation:    Appearance age appropriate, casually dressed   Behavior cooperative, calm   Speech normal rate, normal volume, normal pitch   Mood less anxious   Affect normal range and intensity, appropriate   Thought Processes organized, goal directed   Associations intact associations   Thought Content no overt delusions   Perceptual Disturbances: no auditory hallucinations, no visual hallucinations   Abnormal Thoughts  Risk Potential Suicidal ideation - None  Homicidal ideation - None  Potential for aggression - No   Orientation oriented to person, place, time/date and situation   Memory recent and remote memory grossly intact   Consciousness alert and awake   Attention Span Concentration Span attention span and concentration are age appropriate   Intellect appears to be of average intelligence   Insight intact   Judgement intact   Muscle Strength and  Gait normal muscle strength and normal muscle tone, normal gait and normal balance   Motor activity no abnormal movements   Language no difficulty naming common objects, no difficulty repeating a phrase, no difficulty writing a sentence   Fund of Knowledge adequate knowledge of current events  adequate fund of knowledge regarding past history  adequate fund of knowledge regarding vocabulary    Pain none   Pain Scale 0       Laboratory Results: I have personally reviewed all pertinent laboratory/tests results  Recent Labs (last 2 months):   Appointment on 02/08/2019   Component Date Value    Carbamazepine Lvl 02/08/2019 6 4     WBC 02/08/2019 7 57     RBC 02/08/2019 4 25     Hemoglobin 02/08/2019 12 2     Hematocrit 02/08/2019 38 1     MCV 02/08/2019 90     MCH 02/08/2019 28 7     MCHC 02/08/2019 32 0     RDW 02/08/2019 12 9     MPV 02/08/2019 10 2     Platelets 97/79/9865 358     nRBC 02/08/2019 0     Neutrophils Relative 02/08/2019 56     Immat GRANS % 02/08/2019 0     Lymphocytes Relative 02/08/2019 33     Monocytes Relative 02/08/2019 8     Eosinophils Relative 02/08/2019 2     Basophils Relative 02/08/2019 1     Neutrophils Absolute 02/08/2019 4 30     Immature Grans Absolute 02/08/2019 0 03     Lymphocytes Absolute 02/08/2019 2 46     Monocytes Absolute 02/08/2019 0 57     Eosinophils Absolute 02/08/2019 0 14     Basophils Absolute 02/08/2019 0 07     Sodium 02/08/2019 136     Potassium 02/08/2019 4 5     Chloride 02/08/2019 104     CO2 02/08/2019 27     ANION GAP 02/08/2019 5     BUN 02/08/2019 13     Creatinine 02/08/2019 0 88     Glucose, Fasting 02/08/2019 94     Calcium 02/08/2019 9 0     AST 02/08/2019 12     ALT 02/08/2019 36     Alkaline Phosphatase 02/08/2019 105     Total Protein 02/08/2019 7 6     Albumin 02/08/2019 3 6     Total Bilirubin 02/08/2019 0 23     eGFR 02/08/2019 82        Assessment/Plan:       Diagnoses and all orders for this visit:    Bipolar I disorder, most recent episode depressed, in full remission ([facility] )  -     ARIPiprazole (ABILIFY) 15 mg tablet; Take 1 tablet (15 mg total) by mouth daily at bedtime for 120 days  -     carBAMazepine (TEGretol) 200 mg tablet;  Take 1 tablet (200 mg total) by mouth 2 (two) times a day for 120 days    SANA (generalized anxiety disorder)    ADHD, predominantly inattentive type  -     atoMOXetine (STRATTERA) 40 mg capsule; Take 1 capsule (40 mg total) by mouth daily for 120 days    Therapeutic drug monitoring        Treatment Recommendations/Precautions:    Continue Lamictal 400 mg at bedtime to help with mood stabilization  Continue Tegretol 200 mg bid also to help with mood  Continue Abilify 15 mg at bedtime to help with mood  Continue Strattera 40 mg daily to improve attention and concentration  Continue Klonopin 1 mg tid to improve anxiety symptoms  Medication management every 3 months  Continue psychotherapy with [facility] therapist Angelica Benavidez  Follows with family physician for glucose and lipid monitoring due to current therapy with antipsychotic medication  Monitor Tegretol level, CBC/diff and CMP in 6 months    Risks/Benefits      Risks, Benefits And Possible Side Effects Of Medications:    Risks, benefits, and possible side effects of medications explained to Eliseo Spain including risk of rash related to treatment with Lamictal, risk of liver impairment and agranulocytosis related to treatment with Tegretol and risk of parkinsonian symptoms, Tardive Dyskinesia and metabolic syndrome related to treatment with antipsychotic medications  She verbalizes understanding and agreement for treatment  Risks of medications in pregnancy explained to Eliseo Spain  She verbalizes understanding and agrees to notify her doctor if she becomes pregnant  Controlled Medication Discussion:     Eliseo Spain has been filling controlled prescriptions on time as prescribed according to Conch[facility]    Discussed with Eliseo Spain the risks of sedation, respiratory depression, impairment of ability to drive and potential for abuse and addiction related to treatment with benzodiazepine medications  She understands risk of treatment with benzodiazepine medications, agrees to not drive if feels impaired and agrees to take medications as prescribed      Psychotherapy Provided:     Individual psychotherapy provided: Yes  Counseling was provided during the session today for 20 minutes  Medications, treatment progress and treatment plan reviewed with [place] [facility]  Goals discussed during in session: improve control of anxiety  Discussed with [facility] Brain coping with ongoing anxiety and inability to hold a job  Coping mechanisms including deep/slow breathing, exercising and playing computer games reviewed with Conner Brain  Supportive therapy provided        Treatment Plan;    Completed and signed during the session: Not applicable - Treatment Plan to be completed by [id number] therapist    Alonso Morales MD 03/25/19

## 2019-03-25 ENCOUNTER — OFFICE VISIT (OUTPATIENT)
Dept: PSYCHIATRY | Facility: CLINIC | Age: 44
End: 2019-03-25
Payer: MEDICARE

## 2019-03-25 VITALS
HEART RATE: 91 BPM | DIASTOLIC BLOOD PRESSURE: 90 MMHG | SYSTOLIC BLOOD PRESSURE: 134 MMHG | WEIGHT: 264 LBS | BODY MASS INDEX: 48.58 KG/M2 | HEIGHT: 62 IN

## 2019-03-25 DIAGNOSIS — F90.0 ADHD, PREDOMINANTLY INATTENTIVE TYPE: Chronic | ICD-10-CM

## 2019-03-25 DIAGNOSIS — F31.76 BIPOLAR I DISORDER, MOST RECENT EPISODE DEPRESSED, IN FULL REMISSION (HCC): Primary | Chronic | ICD-10-CM

## 2019-03-25 DIAGNOSIS — F41.1 GAD (GENERALIZED ANXIETY DISORDER): Chronic | ICD-10-CM

## 2019-03-25 DIAGNOSIS — Z51.81 THERAPEUTIC DRUG MONITORING: Chronic | ICD-10-CM

## 2019-03-25 PROCEDURE — 99213 OFFICE O/P EST LOW 20 MIN: CPT | Performed by: PSYCHIATRY & NEUROLOGY

## 2019-03-25 PROCEDURE — 90833 PSYTX W PT W E/M 30 MIN: CPT | Performed by: PSYCHIATRY & NEUROLOGY

## 2019-03-25 RX ORDER — ARIPIPRAZOLE 15 MG/1
15 TABLET ORAL
Qty: 30 TABLET | Refills: 3 | Status: SHIPPED | OUTPATIENT
Start: 2019-03-25 | End: 2019-05-24 | Stop reason: SDUPTHER

## 2019-03-25 RX ORDER — ATOMOXETINE 40 MG/1
40 CAPSULE ORAL DAILY
Qty: 30 CAPSULE | Refills: 3 | Status: SHIPPED | OUTPATIENT
Start: 2019-03-25 | End: 2019-05-24 | Stop reason: SDUPTHER

## 2019-03-25 RX ORDER — CARBAMAZEPINE 200 MG/1
200 TABLET ORAL 2 TIMES DAILY
Qty: 60 TABLET | Refills: 3 | Status: SHIPPED | OUTPATIENT
Start: 2019-03-25 | End: 2019-05-24 | Stop reason: SDUPTHER

## 2019-04-09 ENCOUNTER — OFFICE VISIT (OUTPATIENT)
Dept: BEHAVIORAL/MENTAL HEALTH CLINIC | Facility: CLINIC | Age: 44
End: 2019-04-09
Payer: MEDICARE

## 2019-04-09 DIAGNOSIS — F41.1 GAD (GENERALIZED ANXIETY DISORDER): Chronic | ICD-10-CM

## 2019-04-09 DIAGNOSIS — F31.76 BIPOLAR I DISORDER, MOST RECENT EPISODE DEPRESSED, IN FULL REMISSION (HCC): Chronic | ICD-10-CM

## 2019-04-09 DIAGNOSIS — F90.0 ADHD, PREDOMINANTLY INATTENTIVE TYPE: Primary | Chronic | ICD-10-CM

## 2019-04-09 PROCEDURE — 90834 PSYTX W PT 45 MINUTES: CPT | Performed by: SOCIAL WORKER

## 2019-05-24 ENCOUNTER — OFFICE VISIT (OUTPATIENT)
Dept: PSYCHIATRY | Facility: CLINIC | Age: 44
End: 2019-05-24
Payer: MEDICARE

## 2019-05-24 VITALS
DIASTOLIC BLOOD PRESSURE: 84 MMHG | BODY MASS INDEX: 48.4 KG/M2 | HEIGHT: 62 IN | SYSTOLIC BLOOD PRESSURE: 135 MMHG | HEART RATE: 82 BPM | WEIGHT: 263 LBS

## 2019-05-24 DIAGNOSIS — Z51.81 THERAPEUTIC DRUG MONITORING: Chronic | ICD-10-CM

## 2019-05-24 DIAGNOSIS — F90.0 ADHD, PREDOMINANTLY INATTENTIVE TYPE: Chronic | ICD-10-CM

## 2019-05-24 DIAGNOSIS — F41.1 GAD (GENERALIZED ANXIETY DISORDER): Chronic | ICD-10-CM

## 2019-05-24 DIAGNOSIS — F31.32 BIPOLAR I DISORDER, MOST RECENT EPISODE DEPRESSED, MODERATE (HCC): Primary | Chronic | ICD-10-CM

## 2019-05-24 PROCEDURE — 99213 OFFICE O/P EST LOW 20 MIN: CPT | Performed by: PSYCHIATRY & NEUROLOGY

## 2019-05-24 PROCEDURE — 90833 PSYTX W PT W E/M 30 MIN: CPT | Performed by: PSYCHIATRY & NEUROLOGY

## 2019-05-24 RX ORDER — LAMOTRIGINE 200 MG/1
400 TABLET ORAL
Qty: 60 TABLET | Refills: 3 | Status: SHIPPED | OUTPATIENT
Start: 2019-05-24 | End: 2019-11-04 | Stop reason: SDUPTHER

## 2019-05-24 RX ORDER — CLONAZEPAM 1 MG/1
1 TABLET ORAL 3 TIMES DAILY
Qty: 90 TABLET | Refills: 3 | Status: SHIPPED | OUTPATIENT
Start: 2019-06-07 | End: 2019-11-04 | Stop reason: SDUPTHER

## 2019-05-24 RX ORDER — CARBAMAZEPINE 200 MG/1
200 TABLET ORAL 2 TIMES DAILY
Qty: 60 TABLET | Refills: 3 | Status: SHIPPED | OUTPATIENT
Start: 2019-05-24 | End: 2019-11-04 | Stop reason: SDUPTHER

## 2019-05-24 RX ORDER — ESCITALOPRAM OXALATE 5 MG/1
5 TABLET ORAL
Qty: 30 TABLET | Refills: 3 | Status: SHIPPED | OUTPATIENT
Start: 2019-05-24 | End: 2019-09-23 | Stop reason: SDUPTHER

## 2019-05-24 RX ORDER — ATOMOXETINE 40 MG/1
40 CAPSULE ORAL DAILY
Qty: 30 CAPSULE | Refills: 3 | Status: SHIPPED | OUTPATIENT
Start: 2019-05-24 | End: 2019-07-24

## 2019-05-24 RX ORDER — ARIPIPRAZOLE 15 MG/1
15 TABLET ORAL
Qty: 30 TABLET | Refills: 3 | Status: SHIPPED | OUTPATIENT
Start: 2019-05-24 | End: 2019-11-04 | Stop reason: SDUPTHER

## 2019-06-03 ENCOUNTER — OFFICE VISIT (OUTPATIENT)
Dept: BEHAVIORAL/MENTAL HEALTH CLINIC | Facility: CLINIC | Age: 44
End: 2019-06-03

## 2019-06-03 DIAGNOSIS — F90.0 ADHD, PREDOMINANTLY INATTENTIVE TYPE: Primary | Chronic | ICD-10-CM

## 2019-06-03 DIAGNOSIS — F41.1 GAD (GENERALIZED ANXIETY DISORDER): Chronic | ICD-10-CM

## 2019-06-03 DIAGNOSIS — F31.32 BIPOLAR I DISORDER, MOST RECENT EPISODE DEPRESSED, MODERATE (HCC): Chronic | ICD-10-CM

## 2019-06-03 PROCEDURE — NOSHOW: Performed by: SOCIAL WORKER

## 2019-07-24 ENCOUNTER — OFFICE VISIT (OUTPATIENT)
Dept: PSYCHIATRY | Facility: CLINIC | Age: 44
End: 2019-07-24
Payer: MEDICARE

## 2019-07-24 VITALS
WEIGHT: 266.9 LBS | DIASTOLIC BLOOD PRESSURE: 91 MMHG | SYSTOLIC BLOOD PRESSURE: 147 MMHG | BODY MASS INDEX: 49.11 KG/M2 | HEART RATE: 71 BPM | HEIGHT: 62 IN | RESPIRATION RATE: 16 BRPM

## 2019-07-24 DIAGNOSIS — F90.0 ADHD, PREDOMINANTLY INATTENTIVE TYPE: Primary | Chronic | ICD-10-CM

## 2019-07-24 DIAGNOSIS — F31.32 BIPOLAR I DISORDER, MOST RECENT EPISODE DEPRESSED, MODERATE (HCC): Chronic | ICD-10-CM

## 2019-07-24 DIAGNOSIS — F41.1 GAD (GENERALIZED ANXIETY DISORDER): Chronic | ICD-10-CM

## 2019-07-24 PROCEDURE — 99214 OFFICE O/P EST MOD 30 MIN: CPT | Performed by: NURSE PRACTITIONER

## 2019-07-24 PROCEDURE — 96127 BRIEF EMOTIONAL/BEHAV ASSMT: CPT | Performed by: NURSE PRACTITIONER

## 2019-07-24 RX ORDER — ATOMOXETINE 60 MG/1
60 CAPSULE ORAL DAILY
Qty: 30 CAPSULE | Refills: 0 | Status: SHIPPED | OUTPATIENT
Start: 2019-07-24 | End: 2019-11-04 | Stop reason: SDUPTHER

## 2019-07-24 NOTE — BH TREATMENT PLAN
TREATMENT PLAN (Medication Management Only)        [facility]    Name and Date of Birth:  Renetta Farris 41 y o  1975  Date of Treatment Plan: July 24, 2019  Diagnosis/Diagnoses:  No diagnosis found  Strengths/Personal Resources for Self-Care: "perseverance, intelligence, good parent"  Area/Areas of need (in own words): "feel the need to be a perfectionist, quick to get angry, low motivation"  1  Long Term Goal: improve ability to complete school work  Target Date: 2 months - 9/24/2019  Person/Persons responsible for completion of goal: Tk Negro  2  Short Term Objective (s) - How will we reach this goal?:   A  Provider new recommended medication/dosage changes and/or continue medication(s): Medication changes: I have discontinued Margi Willson's atoMOXetine  I am also having her start on atoMOXetine  Additionally, I am having her maintain her Vitamin D (Cholecalciferol), ARIPiprazole, carBAMazepine, lamoTRIgine, clonazePAM, and escitalopram   B  2 sets of 10 deep breathing exercises per day  C  N/A  Target Date: 3 months - 10/24/2019  Person/Persons Responsible for Completion of Goal: Tk Negro  Progress Towards Goals: continuing treatment  Treatment Modality: medication management every 4 weeks  Review due 90 to 120 days from date of this plan: 3 months - 10/24/2019  Expected length of service: ongoing treatment  My Physician/PA/NP and I have developed this plan together and I agree to work on the goals and objectives  I understand the treatment goals that were developed for my treatment
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
## 2019-07-26 ENCOUNTER — TRANSCRIBE ORDERS (OUTPATIENT)
Dept: ADMINISTRATIVE | Facility: HOSPITAL | Age: 44
End: 2019-07-26

## 2019-07-26 ENCOUNTER — APPOINTMENT (OUTPATIENT)
Dept: LAB | Age: 44
End: 2019-07-26
Payer: MEDICARE

## 2019-07-26 DIAGNOSIS — E66.01 MORBID OBESITY (HCC): Primary | ICD-10-CM

## 2019-07-26 DIAGNOSIS — E66.01 MORBID OBESITY (HCC): ICD-10-CM

## 2019-07-26 LAB
25(OH)D3 SERPL-MCNC: 25.4 NG/ML (ref 30–100)
ALBUMIN SERPL BCP-MCNC: 3.5 G/DL (ref 3.5–5)
ALP SERPL-CCNC: 124 U/L (ref 46–116)
ALT SERPL W P-5'-P-CCNC: 36 U/L (ref 12–78)
ANION GAP SERPL CALCULATED.3IONS-SCNC: 4 MMOL/L (ref 4–13)
AST SERPL W P-5'-P-CCNC: 11 U/L (ref 5–45)
BASOPHILS # BLD AUTO: 0.04 THOUSANDS/ΜL (ref 0–0.1)
BASOPHILS NFR BLD AUTO: 1 % (ref 0–1)
BILIRUB SERPL-MCNC: 0.21 MG/DL (ref 0.2–1)
BUN SERPL-MCNC: 13 MG/DL (ref 5–25)
CALCIUM SERPL-MCNC: 9.1 MG/DL (ref 8.3–10.1)
CHLORIDE SERPL-SCNC: 103 MMOL/L (ref 100–108)
CHOLEST SERPL-MCNC: 243 MG/DL (ref 50–200)
CO2 SERPL-SCNC: 30 MMOL/L (ref 21–32)
CREAT SERPL-MCNC: 1.01 MG/DL (ref 0.6–1.3)
EOSINOPHIL # BLD AUTO: 0.17 THOUSAND/ΜL (ref 0–0.61)
EOSINOPHIL NFR BLD AUTO: 2 % (ref 0–6)
ERYTHROCYTE [DISTWIDTH] IN BLOOD BY AUTOMATED COUNT: 13 % (ref 11.6–15.1)
EST. AVERAGE GLUCOSE BLD GHB EST-MCNC: 114 MG/DL
FERRITIN SERPL-MCNC: 17 NG/ML (ref 8–388)
GFR SERPL CREATININE-BSD FRML MDRD: 69 ML/MIN/1.73SQ M
GLUCOSE P FAST SERPL-MCNC: 94 MG/DL (ref 65–99)
HBA1C MFR BLD: 5.6 % (ref 4.2–6.3)
HCT VFR BLD AUTO: 38.8 % (ref 34.8–46.1)
HDLC SERPL-MCNC: 45 MG/DL (ref 40–60)
HGB BLD-MCNC: 12.4 G/DL (ref 11.5–15.4)
IMM GRANULOCYTES # BLD AUTO: 0.02 THOUSAND/UL (ref 0–0.2)
IMM GRANULOCYTES NFR BLD AUTO: 0 % (ref 0–2)
IRON SERPL-MCNC: 67 UG/DL (ref 50–170)
LDLC SERPL CALC-MCNC: 179 MG/DL (ref 0–100)
LYMPHOCYTES # BLD AUTO: 2.04 THOUSANDS/ΜL (ref 0.6–4.47)
LYMPHOCYTES NFR BLD AUTO: 29 % (ref 14–44)
MCH RBC QN AUTO: 28.7 PG (ref 26.8–34.3)
MCHC RBC AUTO-ENTMCNC: 32 G/DL (ref 31.4–37.4)
MCV RBC AUTO: 90 FL (ref 82–98)
MONOCYTES # BLD AUTO: 0.52 THOUSAND/ΜL (ref 0.17–1.22)
MONOCYTES NFR BLD AUTO: 7 % (ref 4–12)
NEUTROPHILS # BLD AUTO: 4.23 THOUSANDS/ΜL (ref 1.85–7.62)
NEUTS SEG NFR BLD AUTO: 61 % (ref 43–75)
NONHDLC SERPL-MCNC: 198 MG/DL
NRBC BLD AUTO-RTO: 0 /100 WBCS
PLATELET # BLD AUTO: 282 THOUSANDS/UL (ref 149–390)
PMV BLD AUTO: 10.2 FL (ref 8.9–12.7)
POTASSIUM SERPL-SCNC: 4.8 MMOL/L (ref 3.5–5.3)
PROT SERPL-MCNC: 7.6 G/DL (ref 6.4–8.2)
PTH-INTACT SERPL-MCNC: 48.8 PG/ML (ref 18.4–80.1)
RBC # BLD AUTO: 4.32 MILLION/UL (ref 3.81–5.12)
SODIUM SERPL-SCNC: 137 MMOL/L (ref 136–145)
TRIGL SERPL-MCNC: 97 MG/DL
TSH SERPL DL<=0.05 MIU/L-ACNC: 1.74 UIU/ML (ref 0.36–3.74)
VIT B12 SERPL-MCNC: 211 PG/ML (ref 100–900)
WBC # BLD AUTO: 7.02 THOUSAND/UL (ref 4.31–10.16)

## 2019-07-26 PROCEDURE — 83540 ASSAY OF IRON: CPT

## 2019-07-26 PROCEDURE — 84425 ASSAY OF VITAMIN B-1: CPT

## 2019-07-26 PROCEDURE — 85025 COMPLETE CBC W/AUTO DIFF WBC: CPT

## 2019-07-26 PROCEDURE — 82607 VITAMIN B-12: CPT

## 2019-07-26 PROCEDURE — 83970 ASSAY OF PARATHORMONE: CPT

## 2019-07-26 PROCEDURE — 84590 ASSAY OF VITAMIN A: CPT

## 2019-07-26 PROCEDURE — 82306 VITAMIN D 25 HYDROXY: CPT

## 2019-07-26 PROCEDURE — 83036 HEMOGLOBIN GLYCOSYLATED A1C: CPT

## 2019-07-26 PROCEDURE — 80061 LIPID PANEL: CPT

## 2019-07-26 PROCEDURE — 84443 ASSAY THYROID STIM HORMONE: CPT

## 2019-07-26 PROCEDURE — 80053 COMPREHEN METABOLIC PANEL: CPT

## 2019-07-26 PROCEDURE — 82747 ASSAY OF FOLIC ACID RBC: CPT

## 2019-07-26 PROCEDURE — 36415 COLL VENOUS BLD VENIPUNCTURE: CPT

## 2019-07-26 PROCEDURE — 82728 ASSAY OF FERRITIN: CPT

## 2019-07-28 LAB
FOLATE BLD-MCNC: 440.6 NG/ML
FOLATE RBC-MCNC: 1197 NG/ML
HCT VFR BLD AUTO: 36.8 % (ref 34–46.6)

## 2019-07-30 LAB
VIT A SERPL-MCNC: 57.2 UG/DL (ref 20.1–62)
VIT B1 BLD-SCNC: 88.6 NMOL/L (ref 66.5–200)

## 2019-09-04 ENCOUNTER — DOCUMENTATION (OUTPATIENT)
Dept: PSYCHIATRY | Facility: CLINIC | Age: 44
End: 2019-09-04

## 2019-09-04 NOTE — PROGRESS NOTES
Treatment Plan not completed within required time limits due to: Anton Li  no show appointment on 9/3/2019

## 2019-09-23 DIAGNOSIS — F31.32 BIPOLAR I DISORDER, MOST RECENT EPISODE DEPRESSED, MODERATE (HCC): Primary | Chronic | ICD-10-CM

## 2019-09-23 DIAGNOSIS — Z51.81 THERAPEUTIC DRUG MONITORING: Chronic | ICD-10-CM

## 2019-09-23 RX ORDER — ESCITALOPRAM OXALATE 5 MG/1
5 TABLET ORAL
Qty: 30 TABLET | Refills: 1 | Status: SHIPPED | OUTPATIENT
Start: 2019-09-23 | End: 2019-11-04 | Stop reason: SDUPTHER
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
## 2019-11-04 ENCOUNTER — OFFICE VISIT (OUTPATIENT)
Dept: PSYCHIATRY | Facility: CLINIC | Age: 44
End: 2019-11-04
Payer: MEDICARE

## 2019-11-04 ENCOUNTER — OFFICE VISIT (OUTPATIENT)
Dept: INTERNAL MEDICINE CLINIC | Age: 44
End: 2019-11-04
Payer: MEDICARE

## 2019-11-04 VITALS
TEMPERATURE: 97.9 F | BODY MASS INDEX: 46.07 KG/M2 | DIASTOLIC BLOOD PRESSURE: 78 MMHG | HEART RATE: 78 BPM | WEIGHT: 260 LBS | SYSTOLIC BLOOD PRESSURE: 116 MMHG | RESPIRATION RATE: 97 BRPM | HEIGHT: 63 IN

## 2019-11-04 VITALS
WEIGHT: 260 LBS | HEIGHT: 63 IN | HEART RATE: 70 BPM | SYSTOLIC BLOOD PRESSURE: 119 MMHG | DIASTOLIC BLOOD PRESSURE: 73 MMHG | BODY MASS INDEX: 46.07 KG/M2

## 2019-11-04 DIAGNOSIS — F31.76 BIPOLAR I DISORDER, MOST RECENT EPISODE DEPRESSED, IN FULL REMISSION (HCC): Primary | Chronic | ICD-10-CM

## 2019-11-04 DIAGNOSIS — F90.0 ADHD, PREDOMINANTLY INATTENTIVE TYPE: Chronic | ICD-10-CM

## 2019-11-04 DIAGNOSIS — J06.9 VIRAL UPPER RESPIRATORY TRACT INFECTION: Primary | ICD-10-CM

## 2019-11-04 DIAGNOSIS — F41.1 GAD (GENERALIZED ANXIETY DISORDER): Chronic | ICD-10-CM

## 2019-11-04 DIAGNOSIS — Z51.81 THERAPEUTIC DRUG MONITORING: Chronic | ICD-10-CM

## 2019-11-04 PROCEDURE — 99213 OFFICE O/P EST LOW 20 MIN: CPT | Performed by: PSYCHIATRY & NEUROLOGY

## 2019-11-04 PROCEDURE — 90833 PSYTX W PT W E/M 30 MIN: CPT | Performed by: PSYCHIATRY & NEUROLOGY

## 2019-11-04 PROCEDURE — 99213 OFFICE O/P EST LOW 20 MIN: CPT | Performed by: INTERNAL MEDICINE

## 2019-11-04 RX ORDER — ATOMOXETINE 60 MG/1
60 CAPSULE ORAL DAILY
Qty: 30 CAPSULE | Refills: 3 | Status: SHIPPED | OUTPATIENT
Start: 2019-11-04 | End: 2020-02-06 | Stop reason: SDUPTHER

## 2019-11-04 RX ORDER — ALBUTEROL SULFATE 90 UG/1
2 AEROSOL, METERED RESPIRATORY (INHALATION) EVERY 6 HOURS PRN
Qty: 1 INHALER | Refills: 5 | Status: SHIPPED | OUTPATIENT
Start: 2019-11-04 | End: 2020-02-06 | Stop reason: ALTCHOICE

## 2019-11-04 RX ORDER — ESCITALOPRAM OXALATE 5 MG/1
5 TABLET ORAL
Qty: 30 TABLET | Refills: 3 | Status: SHIPPED | OUTPATIENT
Start: 2019-11-04 | End: 2020-02-06 | Stop reason: SDUPTHER

## 2019-11-04 RX ORDER — CARBAMAZEPINE 200 MG/1
200 TABLET ORAL 2 TIMES DAILY
Qty: 60 TABLET | Refills: 3 | Status: SHIPPED | OUTPATIENT
Start: 2019-11-04 | End: 2020-02-06 | Stop reason: SDUPTHER

## 2019-11-04 RX ORDER — ARIPIPRAZOLE 15 MG/1
15 TABLET ORAL
Qty: 30 TABLET | Refills: 3 | Status: SHIPPED | OUTPATIENT
Start: 2019-11-04 | End: 2020-02-06 | Stop reason: SDUPTHER

## 2019-11-04 RX ORDER — LAMOTRIGINE 200 MG/1
400 TABLET ORAL
Qty: 60 TABLET | Refills: 3 | Status: SHIPPED | OUTPATIENT
Start: 2019-11-04 | End: 2020-02-06 | Stop reason: SDUPTHER

## 2019-11-04 RX ORDER — CLONAZEPAM 1 MG/1
1 TABLET ORAL 3 TIMES DAILY
Qty: 90 TABLET | Refills: 3 | Status: SHIPPED | OUTPATIENT
Start: 2019-11-04 | End: 2020-02-06 | Stop reason: SDUPTHER

## 2019-11-04 NOTE — PATIENT INSTRUCTIONS
URI (upper respiratory infection)  Increase fluids, rest, steam, honey  flonase 2 sprays q nostril daily  mucinex daily  Albuterol 2 puffs q 4 hours as needed for chest tightness  Go to the ER if becomes SOB, CP, symptoms worsen  Call if symptoms persist, fever
138

## 2019-11-04 NOTE — BH TREATMENT PLAN
TREATMENT PLAN (Medication Management Only)        [facility]    Name/Date of Birth/MRN:  Oly Goldman 41 y o  1975 MRN: [id number]  Date of Treatment Plan: November 4, 2019  Diagnosis/Diagnoses:   1  Bipolar I disorder, most recent episode depressed, in full remission ([facility] )    2  SANA (generalized anxiety disorder)    3  ADHD, predominantly inattentive type    4  Therapeutic drug monitoring      Strengths/Personal Resources for Self-Care: "studious, driven, compassionate"  Area/Areas of need (in own words): "easily stressed and overwhelmed"  1  Long Term Goal:   "maintain employment", maintain mood stability and low anxiety levels  Target Date: 4 months - 3/4/2020  Person/Persons responsible for completion of goal: Margi Villanueva  Short Term Objective (s) - How will we reach this goal?:   A  Provider new recommended medication/dosage changes and/or continue medication(s): continue current medications as prescribed (Lexapro, Tegretol, Lamictal, Abilify, Klonopin and Strattera)  B   "monitor symptoms and stress level"  C   N/A  Target Date: 4 months - 3/4/2020  Person/Persons Responsible for Completion of Goal: Bonnie Schmitt   Progress Towards Goals: stable  Treatment Modality: medication management every 4 months  Review due 180 days from date of this plan: 6 months - 5/4/2020  Expected length of service: maintenance unless revised  My Physician/PA/NP and I have developed this plan together and I agree to work on the goals and objectives  I understand the treatment goals that were developed for my treatment    Electronic Signatures: on file (unless signed below)    Sandip Roldan MD 11/04/19

## 2019-11-04 NOTE — PROGRESS NOTES
Assessment/Plan:    URI (upper respiratory infection)  Increase fluids, rest, steam, honey  flonase 2 sprays q nostril daily  mucinex daily  Albuterol 2 puffs q 4 hours as needed for chest tightness  Go to the ER if becomes SOB, CP, symptoms worsen  Call if symptoms persist, fever              Diagnoses and all orders for this visit:    Viral upper respiratory tract infection  -     albuterol (PROVENTIL HFA,VENTOLIN HFA) 90 mcg/act inhaler; Inhale 2 puffs every 6 (six) hours as needed for wheezing or shortness of breath          Subjective:      Patient ID: Jenelle Elaine is a 41 y o  female  She had a similar episode last year with the chest tightness and went for a CXR at that time which was normal   She has hx of exercise induced asthma as a child  No sick contacts  She is UTD with flu vaccine  She feels the symptoms are exactly the same as last year  She started with larygitis on Thursday and had myalgias over the weekend but that has resolved    URI    This is a new problem  The current episode started in the past 7 days (5 days)  The problem has been gradually worsening  There has been no fever  Associated symptoms include chest pain, congestion, coughing, ear pain, headaches, a plugged ear sensation, rhinorrhea and sinus pain  Pertinent negatives include no abdominal pain, diarrhea, joint pain, joint swelling, nausea, sneezing, sore throat, swollen glands, vomiting or wheezing  Associated symptoms comments: Chest tightness, feels difficult to take a deep breath  Right ear pain, yellow mucous  She has tried antihistamine, decongestant and increased fluids (dayquil/nyquil) for the symptoms  The treatment provided no relief  Pt presents today with laryngitis since last Thursday  She also reports a cough and SOB that started yesterday  The cough is dry and she states that she feels like she has a tickle in her throat  She states that she feels as if she is unable to take a deep breath   She feels tight on the sides of her chest  She also had myalgias and joint stiffness on Friday and Saturday but states that this has subsided  She also has sinus pressure and mild congestion  Pt has b/l ear pain which she states is worse on the R side  She also notes a decrease in appetite and is fatigued and sleeping more than usual    She had a headache that began this morning that she states is localized to the R side of her forehead  She states her symptoms are worsening since they began  She denies any fevers/chills, rashes, sore throat, chest pain, wheezing, abdominal pain, N/V/D, or dizziness  She has tried tea, dayquil, lots of fluids, rest with no relief  She reports she had the same symptoms last year and was sent for a CXR but did not know the cause  The following portions of the patient's history were reviewed and updated as appropriate: past family history, past surgical history and problem list     Review of Systems   Constitutional: Positive for fatigue  Negative for chills and fever  HENT: Positive for congestion, ear pain, rhinorrhea and sinus pain  Negative for sneezing and sore throat  Eyes: Negative for visual disturbance  Respiratory: Positive for cough  Negative for shortness of breath and wheezing  Cardiovascular: Positive for chest pain  Negative for palpitations and leg swelling  Gastrointestinal: Negative for abdominal pain, constipation, diarrhea, nausea and vomiting  Musculoskeletal: Negative for joint pain  Neurological: Positive for headaches  Negative for dizziness  Psychiatric/Behavioral: Negative for dysphoric mood  The patient is not nervous/anxious            Past Medical History:   Diagnosis Date    Acute pancreatitis     Last Assessed: 12Apr2016; due to gallstones (removed)    Anemia     Anxiety     Arthralgia     Concussion     Last Assessed: [id number]    Endometriosis     Esophageal reflux     Familial combined hyperlipidemia     Last Assessed: [id number]    Gastropathy     Head injury     History of sinusitis     Irregular heart beat     Migraine     Last Assessed: 2012    Paroxysmal supraventricular tachycardia (HCC)     Peptic ulcer disease     Spontaneous      Substance abuse ([facility] )     [facility]icide attempt ([facility] )     UTI (urinary tract infection)     Vitamin D deficiency          Current Outpatient Medications:     ARIPiprazole (ABILIFY) 15 mg tablet, Take 1 tablet (15 mg total) by mouth daily at bedtime for 120 days, Disp: 30 tablet, Rfl: 3    atoMOXetine (STRATTERA) 60 mg capsule, Take 1 capsule (60 mg total) by mouth daily for 30 days, Disp: 30 capsule, Rfl: 0    carBAMazepine (TEGretol) 200 mg tablet, Take 1 tablet (200 mg total) by mouth 2 (two) times a day for 120 days, Disp: 60 tablet, Rfl: 3    clonazePAM (KlonoPIN) 1 mg tablet, Take 1 tablet (1 mg total) by mouth 3 (three) times a day for 120 days To be filled on or after 19, Disp: 90 tablet, Rfl: 3    escitalopram (LEXAPRO) 5 mg tablet, Take 1 tablet (5 mg total) by mouth daily at bedtime, Disp: 30 tablet, Rfl: 1    lamoTRIgine (LaMICtal) 200 MG tablet, Take 2 tablets (400 mg total) by mouth daily at bedtime for 120 days, Disp: 60 tablet, Rfl: 3    Vitamin D, Cholecalciferol, 1000 units CAPS, Take 2,000 Units by mouth, Disp: , Rfl:     albuterol (PROVENTIL HFA,VENTOLIN HFA) 90 mcg/act inhaler, Inhale 2 puffs every 6 (six) hours as needed for wheezing or shortness of breath, Disp: 1 Inhaler, Rfl: 5    Allergies   Allergen Reactions    Geodon [Ziprasidone]      Nystagmus    Quetiapine      Other reaction(s):  Other (See Comments)  Per pt low blood pressure and fainting    Other      Seasonal allergies       Social History   Past Surgical History:   Procedure Laterality Date     SECTION      FOREARM SURGERY Left     INDUCED       x3    LAPAROSCOPIC CHOLECYSTECTOMY      Last Assessed: 2016    LAPAROSCOPY      Exploratory    TONSILLECTOMY      Onset: [id number]     Family History   Problem Relation Age of Onset    Hyperthyroidism Mother     Thyroid disease Mother     Depression Father     Hypertension Father     Obesity Father     Other Paternal Grandmother         Cardiac Disorder    Dementia Paternal Grandmother     Hypertension Paternal Grandmother     Diabetes Paternal Grandmother     Hypertension Paternal Grandfather     Other Paternal Grandfather         Cardiac Disorder    Diabetes Paternal Grandfather     Schizophrenia Other     Schizophrenia Other     Suicidality Other     Completed Suicide  Other     Breast cancer Other     Hypertension Paternal Aunt     Stroke Neg Hx        Objective:  /78 (BP Location: Left arm, Patient Position: Sitting, Cuff Size: Large)   Pulse 78   Temp 97 9 °F (36 6 °C) (Tympanic)   Resp (!) 97   Ht 5' 3" (1 6 m)   Wt 118 kg (260 lb)   BMI 46 06 kg/m²     No results found for this or any previous visit (from the past 1344 hour(s))           Physical Exam

## 2019-11-04 NOTE — ASSESSMENT & PLAN NOTE
Increase fluids, rest, steam, honey  flonase 2 sprays q nostril daily  mucinex daily  Albuterol 2 puffs q 4 hours as needed for chest tightness  Go to the ER if becomes SOB, CP, symptoms worsen  Call if symptoms persist, fever

## 2020-01-27 ENCOUNTER — ANNUAL EXAM (OUTPATIENT)
Dept: OBGYN CLINIC | Facility: CLINIC | Age: 45
End: 2020-01-27
Payer: MEDICARE

## 2020-01-27 VITALS — SYSTOLIC BLOOD PRESSURE: 146 MMHG | WEIGHT: 269 LBS | DIASTOLIC BLOOD PRESSURE: 84 MMHG | BODY MASS INDEX: 47.65 KG/M2

## 2020-01-27 DIAGNOSIS — N92.0 MENORRHAGIA WITH REGULAR CYCLE: ICD-10-CM

## 2020-01-27 DIAGNOSIS — Z12.31 ENCOUNTER FOR SCREENING MAMMOGRAM FOR MALIGNANT NEOPLASM OF BREAST: ICD-10-CM

## 2020-01-27 DIAGNOSIS — Z12.4 ENCOUNTER FOR PAPANICOLAOU SMEAR FOR CERVICAL CANCER SCREENING: ICD-10-CM

## 2020-01-27 DIAGNOSIS — Z01.419 ENCOUNTER FOR GYNECOLOGICAL EXAMINATION (GENERAL) (ROUTINE) WITHOUT ABNORMAL FINDINGS: Primary | ICD-10-CM

## 2020-01-27 PROCEDURE — G0145 SCR C/V CYTO,THINLAYER,RESCR: HCPCS | Performed by: NURSE PRACTITIONER

## 2020-01-27 PROCEDURE — G0101 CA SCREEN;PELVIC/BREAST EXAM: HCPCS | Performed by: NURSE PRACTITIONER

## 2020-01-27 NOTE — PROGRESS NOTES
Assessment/Plan:    Encounter for gynecological examination (general) (routine) without abnormal findings  Normal findings on routine gyn exam  Advised monthly SBE, annual CBE and annual screening mammo  Reviewed ASCCP guidelines and pap with reflex was collected today; insurance will not cover cotesting  STI testing was offered and the patient declines; she reports low risk  The patient report vasectomy for contraception  Diet/activity recommendations:  Encouraged daily Ca++ and vitamin D intake as well as daily weight bearing exercise for promotion of bone health  RTO in one year or sooner PRN  Menorrhagia with regular cycle  Longstanding h/o heavy menses, affecting qol  Previous trial of OCP from Dr Alona Perez was ineffective  Today we discussed options for management incl medical and surgical; risks/benefits reviewed  The patient reports she previously liked Mirena IUD and is interested in this again for induction of amenorrhea  Staff notified; advised insert ideally with menses but given spouse with vas ok to insert any time  Diagnoses and all orders for this visit:    Encounter for gynecological examination (general) (routine) without abnormal findings    Encounter for screening mammogram for malignant neoplasm of breast  -     Mammo screening bilateral w cad; Future    Encounter for Papanicolaou smear for cervical cancer screening  -     Liquid-based pap, screening    Menorrhagia with regular cycle          Subjective:      Patient ID: Edwige Crowley is a 45 y o  female  This patient presents for routine annual gyn exam    Medically stable  Planning gastric sleeve later this spring with [facility]  Followed by psych for complex history  C/o longstanding heavy menses  Trial of OCP from Alona Perez recently was ineffective  Unable to locate record of this OCP trial in chart  She previously liked Mirena and would consider this again for induction of amenorrhea     She reports h/o PCOS based on lab and US results  She denies acute gyn complaints  Menses are regular and light to mod  She denies pelvic pain, breast concerns, abnormal discharge, bowel/bladder dysfunction, depression/anxiety   and monogamous  She denies STI concerns  Vas for contraception  The following portions of the patient's history were reviewed and updated as appropriate: allergies, current medications, past family history, past medical history, past social history, past surgical history and problem list     Review of Systems   Constitutional: Negative  Respiratory: Negative  Cardiovascular: Negative  Gastrointestinal: Negative  Genitourinary: Positive for menstrual problem  Negative for dysuria, frequency, pelvic pain, vaginal bleeding and vaginal discharge  Musculoskeletal: Negative  Skin: Negative  Neurological: Negative  Psychiatric/Behavioral: Negative  Objective:      /84   Wt 122 kg (269 lb)   LMP 12/26/2019   BMI 47 65 kg/m²          Physical Exam   Constitutional: She is oriented to person, place, and time  She appears well-developed and well-nourished  HENT:   Head: Normocephalic and atraumatic  Eyes: Pupils are equal, round, and reactive to light  EOM are normal    Neck: Normal range of motion  Neck supple  No thyromegaly present  Cardiovascular: Normal rate, regular rhythm and normal heart sounds  Pulmonary/Chest: Effort normal and breath sounds normal  No respiratory distress  She has no wheezes  She has no rales  She exhibits no mass, no tenderness and no deformity  Right breast exhibits no inverted nipple, no mass, no nipple discharge, no skin change and no tenderness  Left breast exhibits no inverted nipple, no mass, no nipple discharge, no skin change and no tenderness  No breast tenderness or discharge  Breasts are symmetrical    Abdominal: Soft  She exhibits no distension and no mass  There is no splenomegaly or hepatomegaly  There is no tenderness  There is no rebound and no guarding  Genitourinary: Rectum normal, vagina normal and uterus normal  No breast tenderness or discharge  No labial fusion  There is no rash, tenderness, lesion or injury on the right labia  There is no rash, tenderness, lesion or injury on the left labia  Cervix exhibits no motion tenderness, no discharge and no friability  Right adnexum displays no mass, no tenderness and no fullness  Left adnexum displays no mass, no tenderness and no fullness  No erythema, tenderness or bleeding in the vagina  No foreign body in the vagina  No vaginal discharge found  Musculoskeletal: Normal range of motion  Lymphadenopathy:     She has no cervical adenopathy  She has no axillary adenopathy  Neurological: She is alert and oriented to person, place, and time  No cranial nerve deficit  Skin: Skin is warm and dry  No rash noted  No cyanosis  Nails show no clubbing  Psychiatric: She has a normal mood and affect   Her speech is normal and behavior is normal  Judgment and thought content normal  Cognition and memory are normal

## 2020-01-27 NOTE — ASSESSMENT & PLAN NOTE
Normal findings on routine gyn exam  Advised monthly SBE, annual CBE and annual screening mammo  Reviewed ASCCP guidelines and pap with reflex was collected today; insurance will not cover cotesting  STI testing was offered and the patient declines; she reports low risk  The patient report vasectomy for contraception  Diet/activity recommendations:  Encouraged daily Ca++ and vitamin D intake as well as daily weight bearing exercise for promotion of bone health  RTO in one year or sooner PRN

## 2020-01-27 NOTE — ASSESSMENT & PLAN NOTE
Longstanding h/o heavy menses, affecting qol  Previous trial of OCP from Dr Alona Perez was ineffective  Today we discussed options for management incl medical and surgical; risks/benefits reviewed  The patient reports she previously liked Mirena IUD and is interested in this again for induction of amenorrhea  Staff notified; advised insert ideally with menses but given spouse with vas ok to insert any time

## 2020-01-28 ENCOUNTER — TELEPHONE (OUTPATIENT)
Dept: OBGYN CLINIC | Facility: CLINIC | Age: 45
End: 2020-01-28

## 2020-01-28 NOTE — TELEPHONE ENCOUNTER
Christos and bill Mirena labeled and placed in [facility] room  Pt can be scheduled with KK for insertion ideally during menses

## 2020-01-28 NOTE — TELEPHONE ENCOUNTER
----- Message from Paul Baker, 10 Js  sent at 1/27/2020 12:10 PM EST -----  This patient is interested in [address] IUD for induction of amenorrhea  She has vasectomy for primary contraception  Ideally it would be great to insert IUD with menses but not absolutely necessary      Thanks

## 2020-01-30 LAB
LAB AP GYN PRIMARY INTERPRETATION: NORMAL
Lab: NORMAL

## 2020-01-31 ENCOUNTER — TELEPHONE (OUTPATIENT)
Dept: OBGYN CLINIC | Facility: CLINIC | Age: 45
End: 2020-01-31

## 2020-01-31 NOTE — TELEPHONE ENCOUNTER
Pt contacted # [phone]-IV[facility] vm- per hippa communication consent on file -lmom advising as directed

## 2020-02-03 ENCOUNTER — OFFICE VISIT (OUTPATIENT)
Dept: INTERNAL MEDICINE CLINIC | Facility: CLINIC | Age: 45
End: 2020-02-03
Payer: MEDICARE

## 2020-02-03 VITALS
BODY MASS INDEX: 48.02 KG/M2 | DIASTOLIC BLOOD PRESSURE: 80 MMHG | WEIGHT: 271 LBS | OXYGEN SATURATION: 97 % | HEIGHT: 63 IN | SYSTOLIC BLOOD PRESSURE: 134 MMHG | HEART RATE: 80 BPM | TEMPERATURE: 98 F

## 2020-02-03 DIAGNOSIS — E66.01 MORBID OBESITY (HCC): Primary | ICD-10-CM

## 2020-02-03 DIAGNOSIS — F31.76 BIPOLAR I DISORDER, MOST RECENT EPISODE DEPRESSED, IN FULL REMISSION (HCC): Chronic | ICD-10-CM

## 2020-02-03 DIAGNOSIS — Z71.89 ENCOUNTER FOR PRE-BARIATRIC SURGERY COUNSELING AND EDUCATION: ICD-10-CM

## 2020-02-03 DIAGNOSIS — F90.0 ADHD, PREDOMINANTLY INATTENTIVE TYPE: Chronic | ICD-10-CM

## 2020-02-03 PROCEDURE — 99214 OFFICE O/P EST MOD 30 MIN: CPT | Performed by: NURSE PRACTITIONER

## 2020-02-03 NOTE — LETTER
February 4, 2020     Sana Day MD  [address] [place] [address],[address] [place] [address]    Patient: Chuy Oliver   YOB: 1975   Date of Visit: 2/3/2020       Dear Dr Ramón Reynoldsal:    Thank you for referring Lary Adame to me for evalualuation  Below are my notes for this consultation  If you have questions, please do not hesitate to call me  Sincerely,        HARRIET Araiza        CC: No Recipients  HARRIET Collado  2/4/2020  8:27 AM  Sign at close encounter   Assessment/Plan:       Problem List Items Addressed This Visit        Other    Morbid obesity ([facility] ) - Primary     Patient, overall, would likely benefit from bariatric surgery as determined by bariatric surgeon  Would like patient's psychiatrist to comment on her ability to be compliant with treatment plan moving forward long term, as the patient has a longstanding relationship with psychiatry  Encounter for pre-bariatric surgery counseling and education     Counseled patient on the importance of regular follow-up for screening and health maintenance, as well as chronic conditions  This will be especially important after a gastric sleeve for the rest of her life  Patient was reminded that many conditions that could be problematic are asymptomatic and appropriate follow-up with the PCP is crucial   Patient verbalized understanding  She expressed that as her primary medical conditions are relating to mental health, and she works closely with her psychiatrist, she did not realize the importance of following up with her PCP  She indicates that she plans on following up as recommended, as successful bariatric surgery is very important to her  Bipolar I disorder, most recent episode depressed, in full remission ([facility] ) (Chronic)     Managed by psychiatry          ADHD, predominantly inattentive type (Chronic)     Managed by psychiatry  BMI Counseling:  Body mass index is 48 01 kg/m²  The BMI is above normal  Patient referred to bariatric surgery due to patient being overweight  Pt is following with bariatric surgery           Subjective:      Patient ID: Fifi Mosley is a 45 y o  female  Pt presents at the recommendation of her bariatric surgeon for evaluation of candidacy of bariatric surgery  She reports that she needs a letter to send to her bariatric surgeon from her PCP that she is a candidate for the gastric sleeve  Her surgeon is Dr Vannesa Gutierrez through [facility]  The surgery is not yet scheduled, but the end of April, beginning of May is the general timeframe for when she is hoping to have it completed  She reports that she was supposed to have surgery in November, but d/t missing appointments, she had to start over with the process  She has not been seen by our office for a follow-up on chronic conditions since May 2018  She was seen for 3 sick visits in the end of November and the beginning of December 2018  She had a sick visit on November 4, 2019  She had a no-show for a follow-up office visit on September 12, 2019 and then again for a  follow-up office visit on November 19, 2019  The patient follows closely with Psychiatry  Regular visits are noted in [address] [place] [address]  The patient notes that she has been compliant with her appointments with bariatric since the beginning of January  The following portions of the patient's history were reviewed and updated as appropriate: allergies, current medications, past family history, past medical history, past social history, past surgical history and problem list     Review of Systems   Constitutional: Negative for chills and fever  HENT: Negative for trouble swallowing  Respiratory: Negative for shortness of breath  Cardiovascular: Negative for chest pain  Gastrointestinal: Negative for abdominal pain, diarrhea, nausea and vomiting     Musculoskeletal: Negative for gait problem  Skin: Negative for rash  Neurological: Positive for headaches (migraines when she gets her menstrual period)  Negative for dizziness  Psychiatric/Behavioral: Negative for sleep disturbance  The patient is nervous/anxious (followed by psychiatry)  All other systems reviewed and are negative          Past Medical History:   Diagnosis Date    Acute pancreatitis     Last Assessed: 2016; due to gallstones (removed)    Anemia     Anxiety     Arthralgia     Concussion     Last Assessed: 2012    Endometriosis     Esophageal reflux     Familial combined hyperlipidemia     Last Assessed: 2013    Gastropathy     Head injury     History of sinusitis     Irregular heart beat     Migraine     Last Assessed: 2012    Paroxysmal supraventricular tachycardia (HCC)     Peptic ulcer disease     Spontaneous      Substance [facility] ([facility] )     Suicide attempt ([facility] )     UTI (urinary tract infection)     Vitamin D deficiency          Current Outpatient Medications:     ARIPiprazole (ABILIFY) 15 mg tablet, Take 1 tablet (15 mg total) by mouth daily at bedtime, Disp: 30 tablet, Rfl: 3    atoMOXetine (STRATTERA) 60 mg capsule, Take 1 capsule (60 mg total) by mouth daily, Disp: 30 capsule, Rfl: 3    carBAMazepine (TEGretol) 200 mg tablet, Take 1 tablet (200 mg total) by mouth 2 (two) times a day, Disp: 60 tablet, Rfl: 3    clonazePAM (KlonoPIN) 1 mg tablet, Take 1 tablet (1 mg total) by mouth 3 (three) times a day, Disp: 90 tablet, Rfl: 3    escitalopram (LEXAPRO) 5 mg tablet, Take 1 tablet (5 mg total) by mouth daily at bedtime, Disp: 30 tablet, Rfl: 3    lamoTRIgine (LaMICtal) 200 MG tablet, Take 2 tablets (400 mg total) by mouth daily at bedtime, Disp: 60 tablet, Rfl: 3    Vitamin D, Cholecalciferol, 1000 units CAPS, Take 2,000 Units by mouth, Disp: , Rfl:     albuterol (PROVENTIL HFA,VENTOLIN HFA) 90 mcg/act inhaler, Inhale 2 puffs every 6 (six) hours as needed for wheezing or shortness of breath (Patient not taking: Reported on 2020), Disp: 1 Inhaler, Rfl: 5    Allergies   Allergen Reactions    Geodon [Ziprasidone]      Nystagmus    Quetiapine      Other reaction(s): Other (See Comments)  Per pt low blood pressure and fainting    Other      Seasonal allergies       Social History   Past Surgical History:   Procedure Laterality Date     SECTION      FOREARM SURGERY Left     INDUCED       x3    LAPAROSCOPIC CHOLECYSTECTOMY      Last Assessed: [id number]    LAPAROSCOPY      Exploratory    TONSILLECTOMY      Onset: [id number]     Family History   Problem Relation Age of Onset    Hyperthyroidism Mother     Thyroid disease Mother    [facility] Depression Father     Hypertension Father     Obesity Father     Other Paternal Grandmother         Cardiac Disorder    Dementia Paternal Grandmother     Hypertension Paternal Grandmother     Diabetes Paternal Grandmother     Hypertension Paternal Grandfather     Other Paternal Grandfather         Cardiac Disorder    Diabetes Paternal Grandfather     Schizophrenia Other     Schizophrenia Other     Suicidality Other     Completed Suicide  Other     Breast cancer Other     Hypertension Paternal Aunt     Stroke Neg Hx        Objective:  /80 (BP Location: Left arm, Patient Position: Sitting, Cuff Size: Large)   Pulse 80   Temp 98 °F (36 7 °C) (Oral)   Ht 5' 3" (1 6 m)   Wt 123 kg (271 lb)   SpO2 97%   BMI 48 01 kg/m²         Physical Exam   Constitutional: She is oriented to person, place, and time  She appears well-developed and well-nourished  No distress  obese   HENT:   Head: Normocephalic and atraumatic  Right Ear: External ear normal    Left Ear: External ear normal    Mouth/Throat: No oropharyngeal exudate  Eyes: Pupils are equal, round, and reactive to light  No scleral icterus  Neck: Normal range of motion  Neck supple  Cardiovascular: Normal rate and regular rhythm  Pulmonary/Chest: Effort normal and breath sounds normal    Abdominal: Soft  Musculoskeletal: Normal range of motion  She exhibits no edema  Lymphadenopathy:     She has no cervical adenopathy  Neurological: She is alert and oriented to person, place, and time  Skin: Skin is warm  Psychiatric: She has a normal mood and affect   Her behavior is normal

## 2020-02-03 NOTE — PROGRESS NOTES
Assessment/Plan:       Problem List Items Addressed This Visit        Other    Morbid obesity ([facility] [address] ) - Primary     Patient, overall, would likely benefit from bariatric surgery as determined by bariatric surgeon  Would like patient's psychiatrist to comment on her ability to be compliant with treatment plan moving forward long term, as the patient has a longstanding relationship with psychiatry  Encounter for pre-bariatric surgery counseling and education     Counseled patient on the importance of regular follow-up for screening and health maintenance, as well as chronic conditions  This will be especially important after a gastric sleeve for the rest of her life  Patient was reminded that many conditions that could be problematic are asymptomatic and appropriate follow-up with the PCP is crucial   Patient verbalized understanding  She expressed that as her primary medical conditions are relating to mental health, and she works closely with her psychiatrist, she did not realize the importance of following up with her PCP  She indicates that she plans on following up as recommended, as successful bariatric surgery is very important to her  Bipolar I disorder, most recent episode depressed, in full remission ([facility] [address] ) (Chronic)     Managed by psychiatry          ADHD, predominantly inattentive type (Chronic)     Managed by psychiatry  BMI Counseling: Body mass index is 48 01 kg/m²  The BMI is above normal  Patient referred to bariatric surgery due to patient being overweight  Pt is following with bariatric surgery           Subjective:      Patient ID: Luke Mancera is a 45 y o  female  Pt presents at the recommendation of her bariatric surgeon for evaluation of candidacy of bariatric surgery  She reports that she needs a letter to send to her bariatric surgeon from her PCP that she is a candidate for the gastric sleeve       Her surgeon is Dr Domenica Fitzgerald through [facility]  The surgery is not yet scheduled, but the end of April, beginning of May is the general timeframe for when she is hoping to have it completed  She reports that she was supposed to have surgery in November, but d/t missing appointments, she had to start over with the process  She has not been seen by our office for a follow-up on chronic conditions since May 2018  She was seen for 3 sick visits in the end of November and the beginning of December 2018  She had a sick visit on November 4, 2019  She had a no-show for a follow-up office visit on September 12, 2019 and then again for a  follow-up office visit on November 19, 2019  The patient follows closely with Psychiatry  Regular visits are noted in [address] [place] [address]  The patient notes that she has been compliant with her appointments with bariatric since the beginning of January  The following portions of the patient's history were reviewed and updated as appropriate: allergies, current medications, past family history, past medical history, past social history, past surgical history and problem list     Review of Systems   Constitutional: Negative for chills and fever  HENT: Negative for trouble swallowing  Respiratory: Negative for shortness of breath  Cardiovascular: Negative for chest pain  Gastrointestinal: Negative for abdominal pain, diarrhea, nausea and vomiting  Musculoskeletal: Negative for gait problem  Skin: Negative for rash  Neurological: Positive for headaches (migraines when she gets her menstrual period)  Negative for dizziness  Psychiatric/Behavioral: Negative for sleep disturbance  The patient is nervous/anxious (followed by psychiatry)  All other systems reviewed and are negative          Past Medical History:   Diagnosis Date    Acute pancreatitis     Last Assessed: 12Apr2016; due to gallstones (removed)    Anemia     Anxiety     Arthralgia     Concussion     Last Assessed: 11Oct2012  Endometriosis     Esophageal reflux     Familial combined hyperlipidemia     Last Assessed: 2013    Gastropathy     Head injury     History of sinusitis     Irregular heart beat     Migraine     Last Assessed: 2012    Paroxysmal supraventricular tachycardia (HCC)     Peptic ulcer disease     Spontaneous      Substance abuse ([facility] [address] )     Suicide attempt ([facility] )     UTI (urinary tract infection)     Vitamin D deficiency          Current Outpatient Medications:     ARIPiprazole (ABILIFY) 15 mg tablet, Take 1 tablet (15 mg total) by mouth daily at bedtime, Disp: 30 tablet, Rfl: 3    atoMOXetine (STRATTERA) 60 mg capsule, Take 1 capsule (60 mg total) by mouth daily, Disp: 30 capsule, Rfl: 3    carBAMazepine (TEGretol) 200 mg tablet, Take 1 tablet (200 mg total) by mouth 2 (two) times a day, Disp: 60 tablet, Rfl: 3    clonazePAM (KlonoPIN) 1 mg tablet, Take 1 tablet (1 mg total) by mouth 3 (three) times a day, Disp: 90 tablet, Rfl: 3    escitalopram (LEXAPRO) 5 mg tablet, Take 1 tablet (5 mg total) by mouth daily at bedtime, Disp: 30 tablet, Rfl: 3    lamoTRIgine (LaMICtal) 200 MG tablet, Take 2 tablets (400 mg total) by mouth daily at bedtime, Disp: 60 tablet, Rfl: 3    Vitamin D, Cholecalciferol, 1000 units CAPS, Take 2,000 Units by mouth, Disp: , Rfl:     albuterol (PROVENTIL HFA,VENTOLIN HFA) 90 mcg/act inhaler, Inhale 2 puffs every 6 (six) hours as needed for wheezing or shortness of breath (Patient not taking: Reported on 2020), Disp: 1 Inhaler, Rfl: 5    Allergies   Allergen Reactions    Geodon [Ziprasidone]      Nystagmus    Quetiapine      Other reaction(s):  Other (See Comments)  Per pt low blood pressure and fainting    Other      Seasonal allergies       Social History   Past Surgical History:   Procedure Laterality Date     SECTION      FOREARM SURGERY Left     INDUCED       x3    LAPAROSCOPIC CHOLECYSTECTOMY      Last Assessed: 2016   Eugene Sorensen LAPAROSCOPY      Exploratory    TONSILLECTOMY      Onset: [id number]     Family History   Problem Relation Age of Onset    Hyperthyroidism Mother     Thyroid disease Mother     Depression Father     Hypertension Father     Obesity Father     Other Paternal Grandmother         Cardiac Disorder    Dementia Paternal Grandmother     Hypertension Paternal Grandmother     Diabetes Paternal Grandmother     Hypertension Paternal Grandfather     Other Paternal Grandfather         Cardiac Disorder    Diabetes Paternal Grandfather     Schizophrenia Other     Schizophrenia Other     Suicidality Other     Completed Suicide  Other     Breast cancer Other     Hypertension Paternal Aunt     Stroke Neg Hx        Objective:  /80 (BP Location: Left arm, Patient Position: Sitting, Cuff Size: Large)   Pulse 80   Temp 98 °F (36 7 °C) (Oral)   Ht 5' 3" (1 6 m)   Wt 123 kg (271 lb)   SpO2 97%   BMI 48 01 kg/m²        Physical Exam   Constitutional: She is oriented to person, place, and time  She appears well-developed and well-nourished  No distress  obese   HENT:   Head: Normocephalic and atraumatic  Right Ear: External ear normal    Left Ear: External ear normal    Mouth/Throat: No oropharyngeal exudate  Eyes: Pupils are equal, round, and reactive to light  No scleral icterus  Neck: Normal range of motion  Neck supple  Cardiovascular: Normal rate and regular rhythm  Pulmonary/Chest: Effort normal and breath sounds normal    Abdominal: Soft  Musculoskeletal: Normal range of motion  She exhibits no edema  Lymphadenopathy:     She has no cervical adenopathy  Neurological: She is alert and oriented to person, place, and time  Skin: Skin is warm  Psychiatric: She has a normal mood and affect   Her behavior is normal

## 2020-02-04 PROBLEM — Z71.89 ENCOUNTER FOR PRE-BARIATRIC SURGERY COUNSELING AND EDUCATION: Status: ACTIVE | Noted: 2020-02-04

## 2020-02-04 PROBLEM — E66.01 MORBID OBESITY (HCC): Status: ACTIVE | Noted: 2020-02-04

## 2020-02-04 PROBLEM — J06.9 URI (UPPER RESPIRATORY INFECTION): Status: RESOLVED | Noted: 2019-11-04 | Resolved: 2020-02-04

## 2020-02-04 PROBLEM — R68.89 FLU-LIKE SYMPTOMS: Status: RESOLVED | Noted: 2018-11-30 | Resolved: 2020-02-04

## 2020-02-04 NOTE — ASSESSMENT & PLAN NOTE
Patient, overall, would likely benefit from bariatric surgery as determined by bariatric surgeon  Would like patient's psychiatrist to comment on her ability to be compliant with treatment plan moving forward long term, as the patient has a longstanding relationship with psychiatry

## 2020-02-04 NOTE — ASSESSMENT & PLAN NOTE
Counseled patient on the importance of regular follow-up for screening and health maintenance, as well as chronic conditions  This will be especially important after a gastric sleeve for the rest of her life  Patient was reminded that many conditions that could be problematic are asymptomatic and appropriate follow-up with the PCP is crucial   Patient verbalized understanding  She expressed that as her primary medical conditions are relating to mental health, and she works closely with her psychiatrist, she did not realize the importance of following up with her PCP  She indicates that she plans on following up as recommended, as successful bariatric surgery is very important to her

## 2020-02-06 ENCOUNTER — OFFICE VISIT (OUTPATIENT)
Dept: PSYCHIATRY | Facility: CLINIC | Age: 45
End: 2020-02-06
Payer: MEDICARE

## 2020-02-06 VITALS
HEIGHT: 62 IN | HEART RATE: 85 BPM | SYSTOLIC BLOOD PRESSURE: 142 MMHG | BODY MASS INDEX: 49.5 KG/M2 | WEIGHT: 269 LBS | DIASTOLIC BLOOD PRESSURE: 90 MMHG

## 2020-02-06 DIAGNOSIS — Z79.899 LONG-TERM USE OF HIGH-RISK MEDICATION: Chronic | ICD-10-CM

## 2020-02-06 DIAGNOSIS — F41.1 GAD (GENERALIZED ANXIETY DISORDER): Chronic | ICD-10-CM

## 2020-02-06 DIAGNOSIS — F90.0 ADHD, PREDOMINANTLY INATTENTIVE TYPE: Chronic | ICD-10-CM

## 2020-02-06 DIAGNOSIS — F31.76 BIPOLAR I DISORDER, MOST RECENT EPISODE DEPRESSED, IN FULL REMISSION (HCC): Primary | Chronic | ICD-10-CM

## 2020-02-06 PROCEDURE — 3008F BODY MASS INDEX DOCD: CPT | Performed by: PSYCHIATRY & NEUROLOGY

## 2020-02-06 PROCEDURE — 1036F TOBACCO NON-USER: CPT | Performed by: PSYCHIATRY & NEUROLOGY

## 2020-02-06 PROCEDURE — 99214 OFFICE O/P EST MOD 30 MIN: CPT | Performed by: PSYCHIATRY & NEUROLOGY

## 2020-02-06 PROCEDURE — 90833 PSYTX W PT W E/M 30 MIN: CPT | Performed by: PSYCHIATRY & NEUROLOGY

## 2020-02-06 RX ORDER — ATOMOXETINE 60 MG/1
60 CAPSULE ORAL DAILY
Qty: 30 CAPSULE | Refills: 3 | Status: SHIPPED | OUTPATIENT
Start: 2020-02-06 | End: 2020-04-29 | Stop reason: SDUPTHER

## 2020-02-06 RX ORDER — ESCITALOPRAM OXALATE 5 MG/1
5 TABLET ORAL
Qty: 30 TABLET | Refills: 3 | Status: SHIPPED | OUTPATIENT
Start: 2020-02-06 | End: 2020-04-29 | Stop reason: SDUPTHER

## 2020-02-06 RX ORDER — ARIPIPRAZOLE 15 MG/1
15 TABLET ORAL
Qty: 30 TABLET | Refills: 3 | Status: SHIPPED | OUTPATIENT
Start: 2020-02-06 | End: 2020-04-29 | Stop reason: SDUPTHER

## 2020-02-06 RX ORDER — CLONAZEPAM 1 MG/1
1 TABLET ORAL 3 TIMES DAILY
Qty: 90 TABLET | Refills: 3 | Status: SHIPPED | OUTPATIENT
Start: 2020-03-03 | End: 2020-04-29 | Stop reason: SDUPTHER

## 2020-02-06 RX ORDER — LAMOTRIGINE 200 MG/1
400 TABLET ORAL
Qty: 60 TABLET | Refills: 3 | Status: SHIPPED | OUTPATIENT
Start: 2020-02-06 | End: 2020-04-29 | Stop reason: SDUPTHER

## 2020-02-06 RX ORDER — CARBAMAZEPINE 200 MG/1
200 TABLET ORAL 2 TIMES DAILY
Qty: 60 TABLET | Refills: 3 | Status: SHIPPED | OUTPATIENT
Start: 2020-02-06 | End: 2020-04-29 | Stop reason: SDUPTHER
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
## 2020-02-10 ENCOUNTER — PROCEDURE VISIT (OUTPATIENT)
Dept: OBGYN CLINIC | Facility: CLINIC | Age: 45
End: 2020-02-10
Payer: MEDICARE

## 2020-02-10 VITALS — WEIGHT: 274 LBS | SYSTOLIC BLOOD PRESSURE: 148 MMHG | BODY MASS INDEX: 50.12 KG/M2 | DIASTOLIC BLOOD PRESSURE: 84 MMHG

## 2020-02-10 DIAGNOSIS — Z30.430 ENCOUNTER FOR IUD INSERTION: Primary | ICD-10-CM

## 2020-02-10 DIAGNOSIS — Z30.431 IUD CHECK UP: ICD-10-CM

## 2020-02-10 LAB — SL AMB POCT URINE HCG: NEGATIVE

## 2020-02-10 PROCEDURE — 58300 INSERT INTRAUTERINE DEVICE: CPT | Performed by: NURSE PRACTITIONER

## 2020-02-10 PROCEDURE — 81025 URINE PREGNANCY TEST: CPT | Performed by: NURSE PRACTITIONER

## 2020-02-10 NOTE — PROGRESS NOTES
Iud insertions  Date/Time: 2/10/2020 4:48 PM  Performed by: HARRIET Wilson  Authorized by: HARRIET Wilson     Consent:     Consent obtained:  Verbal and written    Consent given by:  Patient    Procedure risks and benefits discussed: yes      Patient questions answered: yes      Patient agrees, verbalizes understanding, and wants to proceed: yes      Educational handouts given: yes      Instructions and paperwork completed: yes    Procedure:     Pelvic exam performed: yes      Negative GC/chlamydia test: low risk; declined  Negative urine pregnancy test: yes      Cervix cleaned and prepped: yes      Speculum placed in vagina: yes      Tenaculum applied to cervix: yes      Uterus sounded: yes      Uterus sound depth (cm):  11    IUD inserted with no complications: yes      IUD type:  Mirena    Strings trimmed: yes    Post-procedure:     Patient tolerated procedure well: yes      Patient will follow up after next period: yes    Comments: This patient presents for Mirena IUD insertion for indication of menorrhagia management  The risks/benefits, SE's/AE's were reviewed and all questions were answered  Written and verbal consent were obtained  Time out was performed and allergies were confirmed  The patient was positioned in lithotomy position and spec was inserted  Anterior cervix was visualized and cleansed with betadine  Tenac was applied to the anterior cervix  Uterus sounded to 11cm  The IUD was inserted without difficulty and the strings were trimmed  Hemostasis was observed and all instruments were removed  The patient tolerated well

## 2020-02-10 NOTE — ASSESSMENT & PLAN NOTE
IUD inserted without difficulty  Pt tolerated well  Reviewed reasons to call and sx to report incl excessive bleeding, pain unrelieved by ibuprofen or symptoms of infection such as fever, chills or foul smelling discharge  Advised pelvic US to confirm position given that uterus sounded to 11cm  Recommended returning to the office in 8-12 weeks for IUD string check  Vas for primary contra, thus back up not required  The patient agrees to the plan

## 2020-03-02 ENCOUNTER — TELEPHONE (OUTPATIENT)
Dept: PSYCHIATRY | Facility: CLINIC | Age: 45
End: 2020-03-02

## 2020-03-04 ENCOUNTER — OFFICE VISIT (OUTPATIENT)
Dept: PSYCHIATRY | Facility: CLINIC | Age: 45
End: 2020-03-04
Payer: MEDICARE

## 2020-03-04 ENCOUNTER — LAB (OUTPATIENT)
Dept: LAB | Age: 45
End: 2020-03-04
Payer: MEDICARE

## 2020-03-04 VITALS
HEIGHT: 63 IN | WEIGHT: 266 LBS | BODY MASS INDEX: 47.13 KG/M2 | SYSTOLIC BLOOD PRESSURE: 124 MMHG | DIASTOLIC BLOOD PRESSURE: 84 MMHG | HEART RATE: 74 BPM

## 2020-03-04 DIAGNOSIS — F90.0 ADHD, PREDOMINANTLY INATTENTIVE TYPE: Chronic | ICD-10-CM

## 2020-03-04 DIAGNOSIS — Z51.81 THERAPEUTIC DRUG MONITORING: Chronic | ICD-10-CM

## 2020-03-04 DIAGNOSIS — F31.76 BIPOLAR I DISORDER, MOST RECENT EPISODE DEPRESSED, IN FULL REMISSION (HCC): Chronic | ICD-10-CM

## 2020-03-04 DIAGNOSIS — F31.76 BIPOLAR I DISORDER, MOST RECENT EPISODE DEPRESSED, IN FULL REMISSION (HCC): Primary | Chronic | ICD-10-CM

## 2020-03-04 DIAGNOSIS — Z79.899 LONG-TERM USE OF HIGH-RISK MEDICATION: Chronic | ICD-10-CM

## 2020-03-04 DIAGNOSIS — F41.1 GAD (GENERALIZED ANXIETY DISORDER): Chronic | ICD-10-CM

## 2020-03-04 DIAGNOSIS — F31.32 BIPOLAR I DISORDER, MOST RECENT EPISODE DEPRESSED, MODERATE (HCC): Chronic | ICD-10-CM

## 2020-03-04 LAB
ALBUMIN SERPL BCP-MCNC: 3.5 G/DL (ref 3.5–5)
ALP SERPL-CCNC: 115 U/L (ref 46–116)
ALT SERPL W P-5'-P-CCNC: 43 U/L (ref 12–78)
ANION GAP SERPL CALCULATED.3IONS-SCNC: 5 MMOL/L (ref 4–13)
AST SERPL W P-5'-P-CCNC: 14 U/L (ref 5–45)
BASOPHILS # BLD AUTO: 0.05 THOUSANDS/ΜL (ref 0–0.1)
BASOPHILS NFR BLD AUTO: 1 % (ref 0–1)
BILIRUB SERPL-MCNC: 0.21 MG/DL (ref 0.2–1)
BUN SERPL-MCNC: 17 MG/DL (ref 5–25)
CALCIUM SERPL-MCNC: 8.7 MG/DL (ref 8.3–10.1)
CARBAMAZEPINE SERPL-MCNC: 4.2 UG/ML (ref 4–12)
CHLORIDE SERPL-SCNC: 109 MMOL/L (ref 100–108)
CO2 SERPL-SCNC: 27 MMOL/L (ref 21–32)
CREAT SERPL-MCNC: 0.85 MG/DL (ref 0.6–1.3)
EOSINOPHIL # BLD AUTO: 0.11 THOUSAND/ΜL (ref 0–0.61)
EOSINOPHIL NFR BLD AUTO: 2 % (ref 0–6)
ERYTHROCYTE [DISTWIDTH] IN BLOOD BY AUTOMATED COUNT: 12.6 % (ref 11.6–15.1)
GFR SERPL CREATININE-BSD FRML MDRD: 85 ML/MIN/1.73SQ M
GLUCOSE P FAST SERPL-MCNC: 99 MG/DL (ref 65–99)
HCT VFR BLD AUTO: 39.7 % (ref 34.8–46.1)
HGB BLD-MCNC: 12.7 G/DL (ref 11.5–15.4)
IMM GRANULOCYTES # BLD AUTO: 0.02 THOUSAND/UL (ref 0–0.2)
IMM GRANULOCYTES NFR BLD AUTO: 0 % (ref 0–2)
LYMPHOCYTES # BLD AUTO: 2.17 THOUSANDS/ΜL (ref 0.6–4.47)
LYMPHOCYTES NFR BLD AUTO: 30 % (ref 14–44)
MCH RBC QN AUTO: 29 PG (ref 26.8–34.3)
MCHC RBC AUTO-ENTMCNC: 32 G/DL (ref 31.4–37.4)
MCV RBC AUTO: 91 FL (ref 82–98)
MONOCYTES # BLD AUTO: 0.53 THOUSAND/ΜL (ref 0.17–1.22)
MONOCYTES NFR BLD AUTO: 7 % (ref 4–12)
NEUTROPHILS # BLD AUTO: 4.34 THOUSANDS/ΜL (ref 1.85–7.62)
NEUTS SEG NFR BLD AUTO: 60 % (ref 43–75)
NRBC BLD AUTO-RTO: 0 /100 WBCS
PLATELET # BLD AUTO: 323 THOUSANDS/UL (ref 149–390)
PMV BLD AUTO: 10 FL (ref 8.9–12.7)
POTASSIUM SERPL-SCNC: 4.5 MMOL/L (ref 3.5–5.3)
PROT SERPL-MCNC: 7.5 G/DL (ref 6.4–8.2)
RBC # BLD AUTO: 4.38 MILLION/UL (ref 3.81–5.12)
SODIUM SERPL-SCNC: 141 MMOL/L (ref 136–145)
WBC # BLD AUTO: 7.22 THOUSAND/UL (ref 4.31–10.16)

## 2020-03-04 PROCEDURE — 85025 COMPLETE CBC W/AUTO DIFF WBC: CPT

## 2020-03-04 PROCEDURE — 1036F TOBACCO NON-USER: CPT | Performed by: PSYCHIATRY & NEUROLOGY

## 2020-03-04 PROCEDURE — 90833 PSYTX W PT W E/M 30 MIN: CPT | Performed by: PSYCHIATRY & NEUROLOGY

## 2020-03-04 PROCEDURE — 80156 ASSAY CARBAMAZEPINE TOTAL: CPT

## 2020-03-04 PROCEDURE — 36415 COLL VENOUS BLD VENIPUNCTURE: CPT

## 2020-03-04 PROCEDURE — 3008F BODY MASS INDEX DOCD: CPT | Performed by: PSYCHIATRY & NEUROLOGY

## 2020-03-04 PROCEDURE — 99213 OFFICE O/P EST LOW 20 MIN: CPT | Performed by: PSYCHIATRY & NEUROLOGY

## 2020-03-04 PROCEDURE — 80053 COMPREHEN METABOLIC PANEL: CPT

## 2020-03-04 NOTE — BH TREATMENT PLAN
TREATMENT PLAN (Medication Management Only)        [employer]    Name/Date of Birth/MRN:  Thomas Ling 45 y o  1975 MRN: [id number]  Date of Treatment Plan: March 4, 2020  Diagnosis/Diagnoses:   1  Bipolar I disorder, most recent episode depressed, in full remission ([facility] )    2  SANA (generalized anxiety disorder)    3  ADHD, predominantly inattentive type    4  Long-term use of high-risk medication      Strengths/Personal Resources for Self-Care: "perseverance, kindness, inquisitive"  Area/Areas of need (in own words): "disorganization, determination (in excess)"  1  Long Term Goal:   "complete master's degree"  Target Date: 3 months - 6/4/2020  Person/Persons responsible for completion of goal: Margi Villanueva  Short Term Objective (s) - How will we reach this goal?:   A  Provider new recommended medication/dosage changes and/or continue medication(s): continue current medications as prescribed (Lexapro, Tegretol, Lamictal, Abilify, Klonopin and Strattera)  B   "continue with schooling and no work (for now)"  C   N/A  Target Date: 3 months - 6/4/2020  Person/Persons Responsible for Completion of Goal: Pansy Shoe   Progress Towards Goals: stable  Treatment Modality: medication management every 3 months  Review due 180 days from date of this plan: 6 months - 9/4/2020  Expected length of service: maintenance unless revised  My Physician/PA/NP and I have developed this plan together and I agree to work on the goals and objectives  I understand the treatment goals that were developed for my treatment    Electronic Signatures: on file (unless signed below)    Von Goldman MD 03/04/20
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
## 2020-03-11 ENCOUNTER — HOSPITAL ENCOUNTER (OUTPATIENT)
Dept: RADIOLOGY | Facility: HOSPITAL | Age: 45
Discharge: HOME | End: 2020-03-11
Payer: MEDICARE

## 2020-03-11 VITALS — BODY MASS INDEX: 47.13 KG/M2 | HEIGHT: 63 IN | WEIGHT: 266 LBS

## 2020-03-11 DIAGNOSIS — Z12.31 ENCOUNTER FOR SCREENING MAMMOGRAM FOR MALIGNANT NEOPLASM OF BREAST: ICD-10-CM

## 2020-03-11 PROCEDURE — 77067 SCR MAMMO BI INCL CAD: CPT

## 2020-03-13 ENCOUNTER — HOSPITAL ENCOUNTER (OUTPATIENT)
Dept: ULTRASOUND IMAGING | Facility: CLINIC | Age: 45
Discharge: HOME | End: 2020-03-13
Payer: MEDICARE

## 2020-03-13 DIAGNOSIS — R92.8 ABNORMAL MAMMOGRAM: ICD-10-CM

## 2020-03-13 PROCEDURE — 76642 ULTRASOUND BREAST LIMITED: CPT

## 2020-03-17 ENCOUNTER — TELEPHONE (OUTPATIENT)
Dept: OBGYN CLINIC | Facility: CLINIC | Age: 45
End: 2020-03-17

## 2020-03-17 NOTE — TELEPHONE ENCOUNTER
----- Message from Fredrick Peguero, [address] sent at 3/16/2020  5:06 PM EDT -----  Additional imaging of the right breast recommended for probable cyst noted on screening mammo  Left breast is WNL   Please advise scheduling right breast ultrasound at her earliest convenience and provide orders if she does not already have this

## 2020-04-29 ENCOUNTER — TELEMEDICINE (OUTPATIENT)
Dept: PSYCHIATRY | Facility: CLINIC | Age: 45
End: 2020-04-29
Payer: MEDICARE

## 2020-04-29 VITALS — WEIGHT: 260 LBS | HEIGHT: 62 IN | BODY MASS INDEX: 47.84 KG/M2

## 2020-04-29 DIAGNOSIS — F41.1 GAD (GENERALIZED ANXIETY DISORDER): Chronic | ICD-10-CM

## 2020-04-29 DIAGNOSIS — F90.0 ADHD, PREDOMINANTLY INATTENTIVE TYPE: Chronic | ICD-10-CM

## 2020-04-29 DIAGNOSIS — Z79.899 LONG-TERM USE OF HIGH-RISK MEDICATION: Chronic | ICD-10-CM

## 2020-04-29 DIAGNOSIS — F31.76 BIPOLAR I DISORDER, MOST RECENT EPISODE DEPRESSED, IN FULL REMISSION (HCC): Primary | Chronic | ICD-10-CM

## 2020-04-29 PROCEDURE — 99214 OFFICE O/P EST MOD 30 MIN: CPT | Performed by: PSYCHIATRY & NEUROLOGY

## 2020-04-29 PROCEDURE — 90833 PSYTX W PT W E/M 30 MIN: CPT | Performed by: PSYCHIATRY & NEUROLOGY

## 2020-04-29 RX ORDER — CLONAZEPAM 1 MG/1
1 TABLET ORAL 3 TIMES DAILY
Qty: 90 TABLET | Refills: 2 | Status: SHIPPED | OUTPATIENT
Start: 2020-07-01 | End: 2020-11-11 | Stop reason: SDUPTHER

## 2020-04-29 RX ORDER — ATOMOXETINE 60 MG/1
60 CAPSULE ORAL DAILY
Qty: 30 CAPSULE | Refills: 3 | Status: SHIPPED | OUTPATIENT
Start: 2020-04-29 | End: 2020-11-11 | Stop reason: SDUPTHER

## 2020-04-29 RX ORDER — ARIPIPRAZOLE 15 MG/1
15 TABLET ORAL
Qty: 30 TABLET | Refills: 3 | Status: SHIPPED | OUTPATIENT
Start: 2020-04-29 | End: 2020-11-11 | Stop reason: SDUPTHER

## 2020-04-29 RX ORDER — ESCITALOPRAM OXALATE 5 MG/1
5 TABLET ORAL
Qty: 30 TABLET | Refills: 3 | Status: SHIPPED | OUTPATIENT
Start: 2020-04-29 | End: 2020-11-11 | Stop reason: SDUPTHER

## 2020-04-29 RX ORDER — LAMOTRIGINE 200 MG/1
400 TABLET ORAL
Qty: 60 TABLET | Refills: 3 | Status: SHIPPED | OUTPATIENT
Start: 2020-04-29 | End: 2020-11-11 | Stop reason: SDUPTHER

## 2020-04-29 RX ORDER — CARBAMAZEPINE 200 MG/1
200 TABLET ORAL 2 TIMES DAILY
Qty: 60 TABLET | Refills: 3 | Status: SHIPPED | OUTPATIENT
Start: 2020-04-29 | End: 2020-11-11 | Stop reason: SDUPTHER

## 2020-06-29 ENCOUNTER — OFFICE VISIT (OUTPATIENT)
Dept: INTERNAL MEDICINE CLINIC | Facility: CLINIC | Age: 45
End: 2020-06-29
Payer: MEDICARE

## 2020-06-29 VITALS
HEART RATE: 74 BPM | SYSTOLIC BLOOD PRESSURE: 116 MMHG | WEIGHT: 260 LBS | OXYGEN SATURATION: 96 % | BODY MASS INDEX: 47.84 KG/M2 | DIASTOLIC BLOOD PRESSURE: 80 MMHG | TEMPERATURE: 97.5 F | HEIGHT: 62 IN

## 2020-06-29 DIAGNOSIS — Z13.6 SCREENING FOR CARDIOVASCULAR CONDITION: ICD-10-CM

## 2020-06-29 DIAGNOSIS — Z00.00 MEDICARE ANNUAL WELLNESS VISIT, SUBSEQUENT: ICD-10-CM

## 2020-06-29 DIAGNOSIS — Z11.4 SCREENING FOR HIV (HUMAN IMMUNODEFICIENCY VIRUS): ICD-10-CM

## 2020-06-29 PROCEDURE — 1036F TOBACCO NON-USER: CPT | Performed by: NURSE PRACTITIONER

## 2020-06-29 PROCEDURE — G0439 PPPS, SUBSEQ VISIT: HCPCS | Performed by: NURSE PRACTITIONER

## 2020-06-29 PROCEDURE — 3008F BODY MASS INDEX DOCD: CPT | Performed by: NURSE PRACTITIONER

## 2020-08-26 ENCOUNTER — LAB (OUTPATIENT)
Dept: LAB | Age: 45
End: 2020-08-26
Payer: COMMERCIAL

## 2020-08-26 ENCOUNTER — TRANSCRIBE ORDERS (OUTPATIENT)
Dept: ADMINISTRATIVE | Facility: HOSPITAL | Age: 45
End: 2020-08-26

## 2020-08-26 DIAGNOSIS — F31.76 BIPOLAR I DISORDER, MOST RECENT EPISODE DEPRESSED, IN FULL REMISSION (HCC): Chronic | ICD-10-CM

## 2020-08-26 DIAGNOSIS — E66.01 MORBID OBESITY (HCC): ICD-10-CM

## 2020-08-26 DIAGNOSIS — Z11.4 SCREENING FOR HIV (HUMAN IMMUNODEFICIENCY VIRUS): ICD-10-CM

## 2020-08-26 DIAGNOSIS — Z79.899 LONG-TERM USE OF HIGH-RISK MEDICATION: Chronic | ICD-10-CM

## 2020-08-26 DIAGNOSIS — E66.01 MORBID OBESITY (HCC): Primary | ICD-10-CM

## 2020-08-26 LAB
25(OH)D3 SERPL-MCNC: 32.6 NG/ML (ref 30–100)
ALBUMIN SERPL BCP-MCNC: 3.5 G/DL (ref 3.5–5)
ALP SERPL-CCNC: 121 U/L (ref 46–116)
ALT SERPL W P-5'-P-CCNC: 52 U/L (ref 12–78)
ANION GAP SERPL CALCULATED.3IONS-SCNC: 7 MMOL/L (ref 4–13)
AST SERPL W P-5'-P-CCNC: 19 U/L (ref 5–45)
BASOPHILS # BLD AUTO: 0.03 THOUSANDS/ΜL (ref 0–0.1)
BASOPHILS NFR BLD AUTO: 1 % (ref 0–1)
BILIRUB SERPL-MCNC: 0.32 MG/DL (ref 0.2–1)
BUN SERPL-MCNC: 12 MG/DL (ref 5–25)
CALCIUM SERPL-MCNC: 8.9 MG/DL (ref 8.3–10.1)
CARBAMAZEPINE SERPL-MCNC: 6.2 UG/ML (ref 4–12)
CHLORIDE SERPL-SCNC: 108 MMOL/L (ref 100–108)
CO2 SERPL-SCNC: 24 MMOL/L (ref 21–32)
CREAT SERPL-MCNC: 0.77 MG/DL (ref 0.6–1.3)
EOSINOPHIL # BLD AUTO: 0.11 THOUSAND/ΜL (ref 0–0.61)
EOSINOPHIL NFR BLD AUTO: 2 % (ref 0–6)
ERYTHROCYTE [DISTWIDTH] IN BLOOD BY AUTOMATED COUNT: 13.2 % (ref 11.6–15.1)
EST. AVERAGE GLUCOSE BLD GHB EST-MCNC: 103 MG/DL
FERRITIN SERPL-MCNC: 25 NG/ML (ref 8–388)
GFR SERPL CREATININE-BSD FRML MDRD: 96 ML/MIN/1.73SQ M
GLUCOSE SERPL-MCNC: 81 MG/DL (ref 65–140)
HBA1C MFR BLD: 5.2 %
HCT VFR BLD AUTO: 39.2 % (ref 34.8–46.1)
HGB BLD-MCNC: 12.2 G/DL (ref 11.5–15.4)
IMM GRANULOCYTES # BLD AUTO: 0.01 THOUSAND/UL (ref 0–0.2)
IMM GRANULOCYTES NFR BLD AUTO: 0 % (ref 0–2)
IRON SERPL-MCNC: 76 UG/DL (ref 50–170)
LYMPHOCYTES # BLD AUTO: 2.02 THOUSANDS/ΜL (ref 0.6–4.47)
LYMPHOCYTES NFR BLD AUTO: 30 % (ref 14–44)
MCH RBC QN AUTO: 28.8 PG (ref 26.8–34.3)
MCHC RBC AUTO-ENTMCNC: 31.1 G/DL (ref 31.4–37.4)
MCV RBC AUTO: 93 FL (ref 82–98)
MONOCYTES # BLD AUTO: 0.45 THOUSAND/ΜL (ref 0.17–1.22)
MONOCYTES NFR BLD AUTO: 7 % (ref 4–12)
NEUTROPHILS # BLD AUTO: 4.04 THOUSANDS/ΜL (ref 1.85–7.62)
NEUTS SEG NFR BLD AUTO: 60 % (ref 43–75)
NRBC BLD AUTO-RTO: 0 /100 WBCS
PLATELET # BLD AUTO: 282 THOUSANDS/UL (ref 149–390)
PMV BLD AUTO: 11.6 FL (ref 8.9–12.7)
POTASSIUM SERPL-SCNC: 3.7 MMOL/L (ref 3.5–5.3)
PROT SERPL-MCNC: 7.2 G/DL (ref 6.4–8.2)
PTH-INTACT SERPL-MCNC: 48.3 PG/ML (ref 18.4–80.1)
RBC # BLD AUTO: 4.24 MILLION/UL (ref 3.81–5.12)
SODIUM SERPL-SCNC: 139 MMOL/L (ref 136–145)
TSH SERPL DL<=0.05 MIU/L-ACNC: 1.3 UIU/ML (ref 0.36–3.74)
VIT B12 SERPL-MCNC: 1026 PG/ML (ref 100–900)
WBC # BLD AUTO: 6.66 THOUSAND/UL (ref 4.31–10.16)

## 2020-08-26 PROCEDURE — 82728 ASSAY OF FERRITIN: CPT

## 2020-08-26 PROCEDURE — 80053 COMPREHEN METABOLIC PANEL: CPT

## 2020-08-26 PROCEDURE — 87389 HIV-1 AG W/HIV-1&-2 AB AG IA: CPT

## 2020-08-26 PROCEDURE — 84425 ASSAY OF VITAMIN B-1: CPT

## 2020-08-26 PROCEDURE — 84443 ASSAY THYROID STIM HORMONE: CPT

## 2020-08-26 PROCEDURE — 82747 ASSAY OF FOLIC ACID RBC: CPT

## 2020-08-26 PROCEDURE — 80156 ASSAY CARBAMAZEPINE TOTAL: CPT

## 2020-08-26 PROCEDURE — 82607 VITAMIN B-12: CPT

## 2020-08-26 PROCEDURE — 83540 ASSAY OF IRON: CPT

## 2020-08-26 PROCEDURE — 85025 COMPLETE CBC W/AUTO DIFF WBC: CPT

## 2020-08-26 PROCEDURE — 83036 HEMOGLOBIN GLYCOSYLATED A1C: CPT

## 2020-08-26 PROCEDURE — 36415 COLL VENOUS BLD VENIPUNCTURE: CPT

## 2020-08-26 PROCEDURE — 83970 ASSAY OF PARATHORMONE: CPT

## 2020-08-26 PROCEDURE — 84590 ASSAY OF VITAMIN A: CPT

## 2020-08-26 PROCEDURE — 82306 VITAMIN D 25 HYDROXY: CPT

## 2020-08-28 LAB
FOLATE BLD-MCNC: 567 NG/ML
FOLATE RBC-MCNC: 1537 NG/ML
HCT VFR BLD AUTO: 36.9 % (ref 34–46.6)
HIV 1+2 AB+HIV1 P24 AG SERPL QL IA: NORMAL

## 2020-08-29 LAB — VIT A SERPL-MCNC: 55.3 UG/DL (ref 20.1–62)

## 2020-08-30 LAB — VIT B1 BLD-SCNC: 103.2 NMOL/L (ref 66.5–200)

## 2020-10-13 DIAGNOSIS — F90.0 ADHD, PREDOMINANTLY INATTENTIVE TYPE: Chronic | ICD-10-CM

## 2020-10-13 RX ORDER — ATOMOXETINE 60 MG/1
CAPSULE ORAL
Qty: 30 CAPSULE | Refills: 3 | OUTPATIENT
Start: 2020-10-13

## 2020-11-11 ENCOUNTER — TELEMEDICINE (OUTPATIENT)
Dept: PSYCHIATRY | Facility: CLINIC | Age: 45
End: 2020-11-11
Payer: MEDICARE

## 2020-11-11 VITALS — WEIGHT: 198 LBS | HEIGHT: 62 IN | BODY MASS INDEX: 36.44 KG/M2

## 2020-11-11 DIAGNOSIS — F31.32 BIPOLAR I DISORDER, MOST RECENT EPISODE DEPRESSED, MODERATE (HCC): Primary | Chronic | ICD-10-CM

## 2020-11-11 DIAGNOSIS — Z79.899 LONG-TERM USE OF HIGH-RISK MEDICATION: Chronic | ICD-10-CM

## 2020-11-11 DIAGNOSIS — F41.1 GAD (GENERALIZED ANXIETY DISORDER): Chronic | ICD-10-CM

## 2020-11-11 DIAGNOSIS — G47.09 OTHER INSOMNIA: Chronic | ICD-10-CM

## 2020-11-11 DIAGNOSIS — F90.0 ADHD, PREDOMINANTLY INATTENTIVE TYPE: Chronic | ICD-10-CM

## 2020-11-11 PROBLEM — K42.9 UMBILICAL HERNIA WITHOUT OBSTRUCTION AND WITHOUT GANGRENE: Status: ACTIVE | Noted: 2020-09-10

## 2020-11-11 PROBLEM — Z98.84 STATUS POST LAPAROSCOPIC SLEEVE GASTRECTOMY: Status: ACTIVE | Noted: 2020-09-10

## 2020-11-11 PROBLEM — K91.2 POSTSURGICAL MALABSORPTION: Status: ACTIVE | Noted: 2020-09-10

## 2020-11-11 PROCEDURE — 99214 OFFICE O/P EST MOD 30 MIN: CPT | Performed by: PSYCHIATRY & NEUROLOGY

## 2020-11-11 PROCEDURE — 90833 PSYTX W PT W E/M 30 MIN: CPT | Performed by: PSYCHIATRY & NEUROLOGY

## 2020-11-11 RX ORDER — LAMOTRIGINE 200 MG/1
400 TABLET ORAL
Qty: 60 TABLET | Refills: 3 | Status: SHIPPED | OUTPATIENT
Start: 2020-11-11 | End: 2021-01-13 | Stop reason: SDUPTHER

## 2020-11-11 RX ORDER — ARIPIPRAZOLE 15 MG/1
15 TABLET ORAL
Qty: 30 TABLET | Refills: 3 | Status: SHIPPED | OUTPATIENT
Start: 2020-11-11 | End: 2021-01-13 | Stop reason: SDUPTHER

## 2020-11-11 RX ORDER — PANTOPRAZOLE SODIUM 40 MG/1
40 TABLET, DELAYED RELEASE ORAL DAILY
COMMUNITY
Start: 2020-11-10

## 2020-11-11 RX ORDER — ATOMOXETINE 60 MG/1
60 CAPSULE ORAL DAILY
Qty: 30 CAPSULE | Refills: 3 | Status: SHIPPED | OUTPATIENT
Start: 2020-11-11 | End: 2021-01-13 | Stop reason: SDUPTHER

## 2020-11-11 RX ORDER — TRAZODONE HYDROCHLORIDE 50 MG/1
50-100 TABLET ORAL
Qty: 60 TABLET | Refills: 3 | Status: SHIPPED | OUTPATIENT
Start: 2020-11-11 | End: 2021-01-13 | Stop reason: SDUPTHER

## 2020-11-11 RX ORDER — CARBAMAZEPINE 200 MG/1
200 TABLET ORAL 2 TIMES DAILY
Qty: 60 TABLET | Refills: 3 | Status: SHIPPED | OUTPATIENT
Start: 2020-11-11 | End: 2021-01-13 | Stop reason: SDUPTHER

## 2020-11-11 RX ORDER — CLONAZEPAM 1 MG/1
1 TABLET ORAL 3 TIMES DAILY
Qty: 90 TABLET | Refills: 3 | Status: SHIPPED | OUTPATIENT
Start: 2020-11-11 | End: 2021-01-13 | Stop reason: SDUPTHER

## 2020-11-11 RX ORDER — ESCITALOPRAM OXALATE 10 MG/1
10 TABLET ORAL
Qty: 30 TABLET | Refills: 3 | Status: SHIPPED | OUTPATIENT
Start: 2020-11-11 | End: 2021-01-13 | Stop reason: SDUPTHER

## 2020-11-17 ENCOUNTER — TELEPHONE (OUTPATIENT)
Dept: INTERNAL MEDICINE CLINIC | Facility: CLINIC | Age: 45
End: 2020-11-17

## 2020-11-18 ENCOUNTER — TELEMEDICINE (OUTPATIENT)
Dept: INTERNAL MEDICINE CLINIC | Facility: CLINIC | Age: 45
End: 2020-11-18
Payer: MEDICARE

## 2020-11-18 DIAGNOSIS — Z20.822 EXPOSURE TO COVID-19 VIRUS: Primary | ICD-10-CM

## 2020-11-18 PROCEDURE — 99213 OFFICE O/P EST LOW 20 MIN: CPT | Performed by: INTERNAL MEDICINE

## 2020-12-09 NOTE — PROGRESS NOTES
"  Infectious Diseases Progress Note    Tricia Plunkett MD     [facility]  [place]: 0 days  Patient Identification:  Name: Lyubov Middleton  Age: 66 y.o.  Sex: female  :  1954  MRN: [id number]         Primary Care Physician: Ana Huitron MD            Subjective: Feeling better than yesterday.  Right-sided flank pain is much improved.  Denies any fever and chills.  Interval History: See consultation note.    Objective:    Scheduled Meds:cefTRIAXone, 1 g, Intravenous, Q24H  [MAR Hold] citalopram, 10 mg, Oral, Daily  [MAR Hold] influenza vaccine, 0.5 mL, Intramuscular, Once  [MAR Hold] insulin lispro, 0-9 Units, Subcutaneous, TID AC  lisinopril, 20 mg, Oral, Daily      Continuous Infusions:sodium chloride 0.9 % with KCl 20 mEq, 100 mL/hr, Last Rate: 100 mL/hr (20 0925)        Vital signs in last 24 hours:  Temp:  [99.1 °F (37.3 °C)-99.7 °F (37.6 °C)] 99.7 °F (37.6 °C)  Heart Rate:  [76-88] 83  Resp:  [16-24] 20  BP: (145-170)/(69-85) 154/69    Intake/Output:    Intake/Output Summary (Last 24 hours) at 2020 1223  Last data filed at 2020 0812  Gross per 24 hour   Intake 1000 ml   Output --   Net 1000 ml       Exam:  /69 (BP Location: Right arm, Patient Position: Lying)   Pulse 83   Temp 99.7 °F (37.6 °C) (Oral)   Resp 20   Ht 162.6 cm (64.02\")   Wt 68.5 kg (151 lb 1.6 oz)   SpO2 93%   BMI 25.92 kg/m²   Patient is examined using the personal protective equipment as per guidelines from infection control for this particular patient as enacted.  Hand washing was performed before and after patient interaction.  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  She is social and helpful in milieu  symmetrical, trachea midline, no JVD                         Lungs:    Clear to auscultation bilaterally, respirations unlabored                 Chest Wall:    No tenderness or deformity                          Heart:    Regular rate and rhythm, S1 and S2 normal                  Abdomen:     Soft, non-tender, bowel sounds active decreased discomfort in the right flank area                 extremities:   Extremities normal, atraumatic, no cyanosis or edema                        Pulses:   Pulses palpable in all extremities                            Skin:   Skin is warm and dry,  no rashes or palpable lesions                  Neurologic: Mostly nonfocal       Data Review:    I reviewed the patient's new clinical results.  Results from last 7 days   Lab Units 12/09/20  0534 12/08/20  1506   WBC 10*3/mm3 14.00* 15.33*   HEMOGLOBIN g/dL 8.3* 9.5*   PLATELETS 10*3/mm3 318 380     Results from last 7 days   Lab Units 12/09/20  0534 12/08/20  1506   SODIUM mmol/L 136 131*   POTASSIUM mmol/L 3.2* 2.1*   CHLORIDE mmol/L 98 86*   CO2 mmol/L 29.5* 33.2*   BUN mg/dL 5* 7*   CREATININE mg/dL 0.86 1.16*   CALCIUM mg/dL 6.9* 7.9*   GLUCOSE mg/dL 273* 535*     Microbiology Results (last 10 days)     Procedure Component Value - Date/Time    COVID PRE-OP / PRE-PROCEDURE SCREENING ORDER (NO ISOLATION) - Swab, Nasopharynx [[id number]]  (Normal) Collected: 12/08/20 1658    Lab Status: Final result Specimen: Swab from Nasopharynx Updated: 12/08/20 1836    Narrative:      The following orders were created for panel order COVID PRE-OP / PRE-PROCEDURE SCREENING ORDER (NO ISOLATION) - Swab, Nasopharynx.  Procedure                               Abnormality         Status                     ---------                               -----------         ------                     Respiratory Panel PCR w/...[[id number]]  Normal              Final result                 Please view results for these tests on the individual orders.    Respiratory  Panel PCR w/COVID-19(SARS-CoV-2) TIP/BOBBY/ANDREY/PAD/COR/MAD/HUMPHREY In-House, NP Swab in UTM/VTM, 3-4 HR TAT - Swab, Nasopharynx [[id number]]  (Normal) Collected: 12/08/20 1658    Lab Status: Final result Specimen: Swab from Nasopharynx Updated: 12/08/20 1836     ADENOVIRUS, PCR Not Detected     Coronavirus 229E Not Detected     Coronavirus HKU1 Not Detected     Coronavirus NL63 Not Detected     Coronavirus OC43 Not Detected     COVID19 Not Detected     Human Metapneumovirus Not Detected     Human Rhinovirus/Enterovirus Not Detected     Influenza A PCR Not Detected     Influenza B PCR Not Detected     Parainfluenza Virus 1 Not Detected     Parainfluenza Virus 2 Not Detected     Parainfluenza Virus 3 Not Detected     Parainfluenza Virus 4 Not Detected     RSV, PCR Not Detected     Bordetella pertussis pcr Not Detected     Bordetella parapertussis PCR Not Detected     Chlamydophila pneumoniae PCR Not Detected     Mycoplasma pneumo by PCR Not Detected    Narrative:      Fact sheet for providers: https://docs.[employer]/wp-content/uploads/NYT6809-1842-RM7.1-EUA-Provider-Fact-Sheet-3.pdf    Fact sheet for patients: https://docs.[employer]/wp-content/uploads/SCC2030-3654-HJ3.1-EUA-Patient-Fact-Sheet-1.pdf    Test performed by PCR.              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  6-history of hypertension           Recommendations/Discussions:  · Continue IV ceftriaxone.  · Urology evaluation and plans for intervention noted.  Hopefully intraoperative samples will be sent for Gram stain and cultures.    · Her urinalysis upon admission was bland and blood cultures were not drawn before antibiotics were initiated.  · Value blood culture in the situation of improved patient after antibiotic treatment is unclear I  think the most important issue would be to get the intraoperative sample and send it for culture to find pathogen against which antibiotic can be tailored to.  Tricia Plunkett MD  12/9/2020  12:23 EST    Much of this encounter note is an electronic transcription/translation of spoken language to printed text. The electronic translation of spoken language may permit erroneous, or at times, nonsensical words or phrases to be inadvertently transcribed; Although I have reviewed the note for such errors, some may still exist
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
## 2021-01-13 ENCOUNTER — OFFICE VISIT (OUTPATIENT)
Dept: PSYCHIATRY | Facility: CLINIC | Age: 46
End: 2021-01-13
Payer: MEDICARE

## 2021-01-13 VITALS — WEIGHT: 203 LBS | HEIGHT: 63 IN | BODY MASS INDEX: 35.97 KG/M2

## 2021-01-13 DIAGNOSIS — F31.32 BIPOLAR I DISORDER, MOST RECENT EPISODE DEPRESSED, MODERATE (HCC): Primary | Chronic | ICD-10-CM

## 2021-01-13 DIAGNOSIS — Z79.899 LONG-TERM USE OF HIGH-RISK MEDICATION: Chronic | ICD-10-CM

## 2021-01-13 DIAGNOSIS — G47.09 OTHER INSOMNIA: Chronic | ICD-10-CM

## 2021-01-13 DIAGNOSIS — F90.0 ADHD, PREDOMINANTLY INATTENTIVE TYPE: Chronic | ICD-10-CM

## 2021-01-13 DIAGNOSIS — F41.1 GAD (GENERALIZED ANXIETY DISORDER): Chronic | ICD-10-CM

## 2021-01-13 PROCEDURE — 99214 OFFICE O/P EST MOD 30 MIN: CPT | Performed by: PSYCHIATRY & NEUROLOGY

## 2021-01-13 PROCEDURE — 90833 PSYTX W PT W E/M 30 MIN: CPT | Performed by: PSYCHIATRY & NEUROLOGY

## 2021-01-13 RX ORDER — CARBAMAZEPINE 200 MG/1
200 TABLET ORAL 2 TIMES DAILY
Qty: 60 TABLET | Refills: 4 | Status: SHIPPED | OUTPATIENT
Start: 2021-01-13 | End: 2021-05-18 | Stop reason: SDUPTHER

## 2021-01-13 RX ORDER — LAMOTRIGINE 200 MG/1
400 TABLET ORAL
Qty: 60 TABLET | Refills: 4 | Status: SHIPPED | OUTPATIENT
Start: 2021-01-13 | End: 2021-05-18 | Stop reason: SDUPTHER

## 2021-01-13 RX ORDER — BUSPIRONE HYDROCHLORIDE 5 MG/1
5 TABLET ORAL 3 TIMES DAILY
Qty: 90 TABLET | Refills: 4 | Status: SHIPPED | OUTPATIENT
Start: 2021-01-13 | End: 2021-05-18 | Stop reason: SDUPTHER

## 2021-01-13 RX ORDER — CLONAZEPAM 1 MG/1
1 TABLET ORAL 3 TIMES DAILY
Qty: 90 TABLET | Refills: 3 | Status: SHIPPED | OUTPATIENT
Start: 2021-03-11 | End: 2021-05-18 | Stop reason: SDUPTHER

## 2021-01-13 RX ORDER — TRAZODONE HYDROCHLORIDE 50 MG/1
50-100 TABLET ORAL
Qty: 60 TABLET | Refills: 4 | Status: SHIPPED | OUTPATIENT
Start: 2021-01-13 | End: 2021-05-18 | Stop reason: SDUPTHER

## 2021-01-13 RX ORDER — ARIPIPRAZOLE 15 MG/1
15 TABLET ORAL
Qty: 30 TABLET | Refills: 4 | Status: SHIPPED | OUTPATIENT
Start: 2021-01-13 | End: 2021-05-18 | Stop reason: SDUPTHER

## 2021-01-13 RX ORDER — ESCITALOPRAM OXALATE 10 MG/1
10 TABLET ORAL
Qty: 30 TABLET | Refills: 4 | Status: SHIPPED | OUTPATIENT
Start: 2021-01-13 | End: 2021-05-18 | Stop reason: SDUPTHER

## 2021-01-13 RX ORDER — ATOMOXETINE 60 MG/1
60 CAPSULE ORAL DAILY
Qty: 30 CAPSULE | Refills: 4 | Status: SHIPPED | OUTPATIENT
Start: 2021-01-13 | End: 2021-05-18 | Stop reason: SDUPTHER

## 2021-01-27 ENCOUNTER — OFFICE VISIT (OUTPATIENT)
Dept: INTERNAL MEDICINE CLINIC | Facility: CLINIC | Age: 46
End: 2021-01-27
Payer: MEDICARE

## 2021-01-27 VITALS
BODY MASS INDEX: 35.68 KG/M2 | OXYGEN SATURATION: 98 % | HEART RATE: 79 BPM | WEIGHT: 201.4 LBS | SYSTOLIC BLOOD PRESSURE: 122 MMHG | HEIGHT: 63 IN | DIASTOLIC BLOOD PRESSURE: 78 MMHG | RESPIRATION RATE: 18 BRPM | TEMPERATURE: 98.7 F

## 2021-01-27 DIAGNOSIS — R42 VERTIGO: Primary | ICD-10-CM

## 2021-01-27 PROCEDURE — 99213 OFFICE O/P EST LOW 20 MIN: CPT | Performed by: NURSE PRACTITIONER

## 2021-01-27 RX ORDER — MAGNESIUM 200 MG
TABLET ORAL DAILY
COMMUNITY
End: 2022-05-19 | Stop reason: ALTCHOICE

## 2021-01-27 RX ORDER — DIPHENOXYLATE HYDROCHLORIDE AND ATROPINE SULFATE 2.5; .025 MG/1; MG/1
1 TABLET ORAL DAILY
COMMUNITY
End: 2021-05-18 | Stop reason: SDUPTHER

## 2021-01-27 RX ORDER — MECLIZINE HCL 12.5 MG/1
12.5 TABLET ORAL EVERY 8 HOURS PRN
Qty: 30 TABLET | Refills: 0 | Status: SHIPPED | OUTPATIENT
Start: 2021-01-27 | End: 2021-05-18 | Stop reason: ALTCHOICE

## 2021-01-27 NOTE — PROGRESS NOTES
Assessment/Plan:    Problem List Items Addressed This Visit     None      Visit Diagnoses     Vertigo    -  Primary    start Meclizine 12 5mg q8h prn for dizziness  follow up with PT for vestibular therapy  follow up in our office in 3 weeks  notify office of any worsening sympt    Relevant Medications    meclizine (ANTIVERT) 12 5 MG tablet    Other Relevant Orders    Ambulatory referral to Physical Therapy          BMI Counseling: Body mass index is 35 68 kg/m²  Discussed the patient's BMI with her  The BMI is above normal  Nutrition recommendations include 3-5 servings of fruits/vegetables daily and increasing intake of lean protein  Exercise recommendations include moderate aerobic physical activity for 150 minutes/week  [employer] software was used to dictate this note  It may contain errors with dictating incorrect words or incorrect spelling  Please contact the provider directly with any questions  Subjective:      Patient ID: Mary Morales is a 39 y o  female  Patient presents today with dizziness that started 1 week ago  She does have a hx of vertigo in the past      Dizziness  This is a new problem  The current episode started in the past 7 days  The problem occurs constantly  Progression since onset: improved after day 1, stable since  Associated symptoms include nausea, vertigo, a visual change (tunnel vision on day 1) and vomiting (1 episode on day 1)  Pertinent negatives include no abdominal pain, chest pain, chills, coughing, fever, headaches or weakness  The symptoms are aggravated by twisting, standing and coughing  She also notes that in the morning she notices a black spot in the center of her left eye which resolves in a few minutes after waking up  Symptoms are made worse by positional changes  Pt also recently start buspar 2 weeks ago   She denies any dizziness the first week she was on the medication     The following portions of the patient's history were reviewed and updated as appropriate: allergies, current medications, past family history, past medical history, past social history, past surgical history and problem list     Review of Systems   Constitutional: Negative for chills and fever  Respiratory: Negative for cough  Cardiovascular: Negative for chest pain  Gastrointestinal: Positive for nausea and vomiting (1 episode on day 1)  Negative for abdominal pain  Neurological: Positive for dizziness and vertigo  Negative for weakness and headaches           Past Medical History:   Diagnosis Date    Acute pancreatitis     Last Assessed: 2016; due to gallstones (removed)    Anemia     Anxiety     Arthralgia     Concussion     Last Assessed: 2012    Endometriosis     Esophageal reflux     Familial combined hyperlipidemia     Last Assessed: 2013    Gastropathy     Head injury     History of sinusitis     Irregular heart beat     Migraine     Last Assessed: 2012    Paroxysmal supraventricular tachycardia (HCC)     Peptic ulcer disease     Spontaneous      Substance abuse ([facility] )     Suicide attempt ([facility] )     UTI (urinary tract infection)     Vitamin D deficiency          Current Outpatient Medications:     ARIPiprazole (ABILIFY) 15 mg tablet, Take 1 tablet (15 mg total) by mouth daily at bedtime, Disp: 30 tablet, Rfl: 4    atoMOXetine (STRATTERA) 60 mg capsule, Take 1 capsule (60 mg total) by mouth daily, Disp: 30 capsule, Rfl: 4    busPIRone (BUSPAR) 5 mg tablet, Take 1 tablet (5 mg total) by mouth 3 (three) times a day, Disp: 90 tablet, Rfl: 4    carBAMazepine (TEGretol) 200 mg tablet, Take 1 tablet (200 mg total) by mouth 2 (two) times a day, Disp: 60 tablet, Rfl: 4    [START ON 3/11/2021] clonazePAM (KlonoPIN) 1 mg tablet, Take 1 tablet (1 mg total) by mouth 3 (three) times a day To be filled on or after 3/11/21, Disp: 90 tablet, Rfl: 3    Cyanocobalamin (B-12) 1000 MCG SUBL, Place under the tongue daily, Disp: , Rfl:   escitalopram (LEXAPRO) 10 mg tablet, Take 1 tablet (10 mg total) by mouth daily at bedtime, Disp: 30 tablet, Rfl: 4    lamoTRIgine (LaMICtal) 200 MG tablet, Take 2 tablets (400 mg total) by mouth daily at bedtime, Disp: 60 tablet, Rfl: 4    Multiple Vitamins-Minerals (HAIR SKIN NAILS PO), Take by mouth daily, Disp: , Rfl:     multivitamin (THERAGRAN) TABS, Take 1 tablet by mouth daily, Disp: , Rfl:     pantoprazole (PROTONIX) 40 mg tablet, daily , Disp: , Rfl:     traZODone (DESYREL) 50 mg tablet, Take 1-2 tablets ( mg total) by mouth daily at bedtime as needed for sleep, Disp: 60 tablet, Rfl: 4    meclizine (ANTIVERT) 12 5 MG tablet, Take 1 tablet (12 5 mg total) by mouth every 8 (eight) hours as needed for dizziness, Disp: 30 tablet, Rfl: 0    Allergies   Allergen Reactions    Geodon [Ziprasidone]      Nystagmus    Quetiapine      Other reaction(s):  Other (See Comments)  Per pt low blood pressure and fainting    Other      Seasonal allergies       Social History   Past Surgical History:   Procedure Laterality Date    BARIATRIC SURGERY  2020    sleeve     SECTION      FOREARM SURGERY Left     INDUCED       x3    LAPAROSCOPIC CHOLECYSTECTOMY      Last Assessed: 2016    LAPAROSCOPY      Exploratory    SLEEVE GASTROPLASTY      TONSILLECTOMY      Onset: [id number]     Family History   Problem Relation Age of Onset    Hyperthyroidism Mother     Thyroid disease Mother    [employer] Depression Father     Hypertension Father     Obesity Father     Other Paternal Grandmother         Cardiac Disorder    Dementia Paternal Grandmother     Hypertension Paternal Grandmother     Diabetes Paternal Grandmother     Hypertension Paternal Grandfather     Other Paternal Grandfather         Cardiac Disorder    Diabetes Paternal Grandfather     Schizophrenia Other     Schizophrenia Other     Suicidality Other     Completed Suicide  Other     Breast cancer Other     Hypertension Paternal Aunt     No Known Problems Maternal Grandmother     No Known Problems Maternal Grandfather     No Known Problems Son     No Known Problems Son     No Known Problems Brother     No Known Problems Maternal Aunt     Stroke Neg Hx     Drug abuse Neg Hx      Objective:  /78 (BP Location: Left arm, Patient Position: Sitting, Cuff Size: Standard)   Pulse 79   Temp 98 7 °F (37 1 °C) (Tympanic)   Resp 18   Ht 5' 3" (1 6 m)   Wt 91 4 kg (201 lb 6 4 oz)   SpO2 98%   BMI 35 68 kg/m²     Orthostatics  BP laying 116/74  BP sitting 112/78  BP standing 112/78     Physical Exam  Vitals signs reviewed  Constitutional:       General: She is not in acute distress  Appearance: Normal appearance  She is well-developed  She is obese  She is not diaphoretic  HENT:      Head: Normocephalic and atraumatic  Right Ear: Tympanic membrane and external ear normal       Left Ear: Tympanic membrane and external ear normal       Nose: Nose normal       Mouth/Throat:      Mouth: Mucous membranes are moist       Pharynx: Oropharynx is clear  No oropharyngeal exudate or posterior oropharyngeal erythema  Eyes:      Extraocular Movements: Extraocular movements intact  Conjunctiva/sclera: Conjunctivae normal       Pupils: Pupils are equal, round, and reactive to light  Neck:      Musculoskeletal: Normal range of motion and neck supple  Vascular: No carotid bruit  Cardiovascular:      Rate and Rhythm: Normal rate and regular rhythm  Heart sounds: Normal heart sounds  No murmur  Pulmonary:      Effort: Pulmonary effort is normal  No respiratory distress  Breath sounds: Normal breath sounds  No decreased breath sounds, wheezing or rhonchi  Lymphadenopathy:      Cervical: No cervical adenopathy  Skin:     General: Skin is warm and dry  Neurological:      General: No focal deficit present  Mental Status: She is alert and oriented to person, place, and time   Mental status is at baseline  Cranial Nerves: No cranial nerve deficit  Motor: No weakness  Coordination: Coordination normal       Gait: Gait normal       Comments: Romberg with minimal swaying  Pt able to maintain balance      Psychiatric:         Behavior: Behavior normal

## 2021-03-02 ENCOUNTER — TELEPHONE (OUTPATIENT)
Dept: OBGYN CLINIC | Facility: CLINIC | Age: 46
End: 2021-03-02

## 2021-03-02 DIAGNOSIS — Z12.31 VISIT FOR SCREENING MAMMOGRAM: Primary | ICD-10-CM

## 2021-03-02 NOTE — TELEPHONE ENCOUNTER
Looks like she would have been due for her annual in 1/2021, that's probably why she doesn't have one  Please offer to schedule routine annual gyn visit and provider her with a mammo order

## 2021-03-02 NOTE — TELEPHONE ENCOUNTER
Patient needs a mammo script ordered so she can schedule her appt  Central scheduling told her there wasn't one in her account

## 2021-03-23 ENCOUNTER — ANNUAL EXAM (OUTPATIENT)
Dept: OBGYN CLINIC | Facility: CLINIC | Age: 46
End: 2021-03-23
Payer: MEDICARE

## 2021-03-23 VITALS — DIASTOLIC BLOOD PRESSURE: 72 MMHG | SYSTOLIC BLOOD PRESSURE: 142 MMHG | WEIGHT: 190 LBS | BODY MASS INDEX: 33.66 KG/M2

## 2021-03-23 DIAGNOSIS — N63.20 LEFT BREAST MASS: ICD-10-CM

## 2021-03-23 DIAGNOSIS — Z12.31 ENCOUNTER FOR SCREENING MAMMOGRAM FOR MALIGNANT NEOPLASM OF BREAST: ICD-10-CM

## 2021-03-23 DIAGNOSIS — Z97.5 IUD CONTRACEPTION: ICD-10-CM

## 2021-03-23 DIAGNOSIS — N64.9 DISORDER OF BREAST, UNSPECIFIED: ICD-10-CM

## 2021-03-23 DIAGNOSIS — Z01.419 ENCOUNTER FOR GYNECOLOGICAL EXAMINATION (GENERAL) (ROUTINE) WITHOUT ABNORMAL FINDINGS: Primary | ICD-10-CM

## 2021-03-23 PROBLEM — N92.0 MENORRHAGIA WITH REGULAR CYCLE: Status: RESOLVED | Noted: 2020-01-27 | Resolved: 2021-03-23

## 2021-03-23 PROCEDURE — G0101 CA SCREEN;PELVIC/BREAST EXAM: HCPCS | Performed by: NURSE PRACTITIONER

## 2021-03-23 NOTE — ASSESSMENT & PLAN NOTE
Normal findings on routine gyn exam  Advised monthly SBE, annual CBE; see notes re: diagnostic breast imaging ordered  Reviewed ASCCP guidelines; currently on Medicare thus cotesting is not covered and pap is covered q other year; discussed plan for pap next year  STI testing was offered and the patient declines; she reports low risk  The patient reports vas for contra and also has Mirena for menorrhagia management  Diet/activity recommendations:  Encouraged daily Ca++ and vitamin D intake as well as daily weight bearing exercise for promotion of bone health  RTO in one year or sooner PRN

## 2021-03-23 NOTE — ASSESSMENT & PLAN NOTE
Mirena IUD present since 2/2020  Likes this and desires to continue use for management of menorrhagia  Normal exam today  Reviewed sx to report and reasons to call    Lori Hernandez is primary contraception

## 2021-03-25 ENCOUNTER — HOSPITAL ENCOUNTER (OUTPATIENT)
Dept: MAMMOGRAPHY | Facility: CLINIC | Age: 46
Discharge: HOME | End: 2021-03-25
Payer: MEDICARE

## 2021-03-25 ENCOUNTER — HOSPITAL ENCOUNTER (OUTPATIENT)
Dept: ULTRASOUND IMAGING | Facility: CLINIC | Age: 46
Discharge: HOME | End: 2021-03-25
Payer: MEDICARE

## 2021-03-25 VITALS — BODY MASS INDEX: 33.66 KG/M2 | HEIGHT: 63 IN | WEIGHT: 190 LBS

## 2021-03-25 DIAGNOSIS — N64.9 DISORDER OF BREAST, UNSPECIFIED: ICD-10-CM

## 2021-03-25 DIAGNOSIS — N63.20 LEFT BREAST MASS: ICD-10-CM

## 2021-03-25 PROCEDURE — 77066 DX MAMMO INCL CAD BI: CPT

## 2021-03-25 PROCEDURE — 76642 ULTRASOUND BREAST LIMITED: CPT

## 2021-03-25 PROCEDURE — G0279 TOMOSYNTHESIS, MAMMO: HCPCS

## 2021-03-29 ENCOUNTER — TELEPHONE (OUTPATIENT)
Dept: OBGYN CLINIC | Facility: CLINIC | Age: 46
End: 2021-03-29

## 2021-03-29 NOTE — TELEPHONE ENCOUNTER
Per comm consent lm to cb to schedule f/u breast exam with KK in 8 wks    Reviewed benign result also    ----- Message from Rhonda Arce sent at 3/28/2021  5:05 PM EDT -----  Benign findings on diagnostic imaging for left breast mass  Please notify patient and schedule her for a f/u breast exam with me in 8 weeks
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
## 2021-05-18 ENCOUNTER — TELEMEDICINE (OUTPATIENT)
Dept: PSYCHIATRY | Facility: CLINIC | Age: 46
End: 2021-05-18
Payer: MEDICARE

## 2021-05-18 VITALS — WEIGHT: 176 LBS | HEIGHT: 62 IN | BODY MASS INDEX: 32.39 KG/M2

## 2021-05-18 DIAGNOSIS — Z79.899 LONG-TERM USE OF HIGH-RISK MEDICATION: Chronic | ICD-10-CM

## 2021-05-18 DIAGNOSIS — F31.32 BIPOLAR I DISORDER, MOST RECENT EPISODE DEPRESSED, MODERATE (HCC): Primary | Chronic | ICD-10-CM

## 2021-05-18 DIAGNOSIS — F90.0 ADHD, PREDOMINANTLY INATTENTIVE TYPE: Chronic | ICD-10-CM

## 2021-05-18 DIAGNOSIS — G47.09 OTHER INSOMNIA: Chronic | ICD-10-CM

## 2021-05-18 DIAGNOSIS — F41.1 GAD (GENERALIZED ANXIETY DISORDER): Chronic | ICD-10-CM

## 2021-05-18 PROCEDURE — 90833 PSYTX W PT W E/M 30 MIN: CPT | Performed by: PSYCHIATRY & NEUROLOGY

## 2021-05-18 PROCEDURE — 99214 OFFICE O/P EST MOD 30 MIN: CPT | Performed by: PSYCHIATRY & NEUROLOGY

## 2021-05-18 RX ORDER — ESCITALOPRAM OXALATE 10 MG/1
15 TABLET ORAL
Qty: 45 TABLET | Refills: 4 | Status: SHIPPED | OUTPATIENT
Start: 2021-05-18 | End: 2021-11-02 | Stop reason: SDUPTHER

## 2021-05-18 RX ORDER — ARIPIPRAZOLE 15 MG/1
15 TABLET ORAL
Qty: 30 TABLET | Refills: 4 | Status: SHIPPED | OUTPATIENT
Start: 2021-05-18 | End: 2022-01-07 | Stop reason: SDUPTHER

## 2021-05-18 RX ORDER — ATOMOXETINE 60 MG/1
60 CAPSULE ORAL DAILY
Qty: 30 CAPSULE | Refills: 4 | Status: SHIPPED | OUTPATIENT
Start: 2021-05-18 | End: 2022-01-07 | Stop reason: SDUPTHER

## 2021-05-18 RX ORDER — BUSPIRONE HYDROCHLORIDE 5 MG/1
5 TABLET ORAL 3 TIMES DAILY
Qty: 90 TABLET | Refills: 4 | Status: SHIPPED | OUTPATIENT
Start: 2021-05-18 | End: 2021-06-29 | Stop reason: ALTCHOICE

## 2021-05-18 RX ORDER — TRAZODONE HYDROCHLORIDE 50 MG/1
50-100 TABLET ORAL
Qty: 60 TABLET | Refills: 4 | Status: SHIPPED | OUTPATIENT
Start: 2021-05-18 | End: 2022-01-07 | Stop reason: SDUPTHER

## 2021-05-18 RX ORDER — CARBAMAZEPINE 200 MG/1
200 TABLET ORAL 2 TIMES DAILY
Qty: 60 TABLET | Refills: 4 | Status: SHIPPED | OUTPATIENT
Start: 2021-05-18 | End: 2022-01-07 | Stop reason: SDUPTHER

## 2021-05-18 RX ORDER — LAMOTRIGINE 200 MG/1
400 TABLET ORAL
Qty: 60 TABLET | Refills: 4 | Status: SHIPPED | OUTPATIENT
Start: 2021-05-18 | End: 2021-10-30

## 2021-05-18 RX ORDER — CLONAZEPAM 1 MG/1
1 TABLET ORAL 3 TIMES DAILY
Qty: 90 TABLET | Refills: 3 | Status: SHIPPED | OUTPATIENT
Start: 2021-07-09 | End: 2022-01-07 | Stop reason: ALTCHOICE

## 2021-05-18 NOTE — BH TREATMENT PLAN
TREATMENT PLAN (Medication Management Only)        [facility]    Name/Date of Birth/MRN:  Jeffery Guerra 39 y o  1975 MRN: [id number]  Date of Treatment Plan: May 18, 2021  Diagnosis/Diagnoses:   1  Bipolar I disorder, most recent episode depressed, moderate ([address] )    2  SANA (generalized anxiety disorder)    3  ADHD, predominantly inattentive type    4  Other insomnia    5  Long-term use of high-risk medication      Strengths/Personal Resources for Self-Care: "I think I have a good support system, I take my medications"  Area/Areas of need (in own words): "improving my depression, to be less symptomatic"  1  Long Term Goal:   improve control of anxiety, help with depression, improve mood stability  Target Date: 2 months - 7/18/2021  Person/Persons responsible for completion of goal: Margi Villanueva  Short Term Objective (s) - How will we reach this goal?:   A  Provider new recommended medication/dosage changes and/or continue medication(s): increase Lexapro, continue all other medications (Trazodone, Tegretol, Lamictal, Abilify, Buspar, Klonopin and Strattera)  B   N/A   C   N/A  Target Date: 2 months - 7/18/2021  Person/Persons Responsible for Completion of Goal: Jie Colunga   Progress Towards Goals: regressed  Treatment Modality: medication management every 2 months  Review due 180 days from date of this plan: 6 months - 11/18/2021  Expected length of service: ongoing treatment unless revised  My Physician/PA/NP and I have developed this plan together and I agree to work on the goals and objectives  I understand the treatment goals that were developed for my treatment    Electronic Signatures: on file (unless signed below)    Rao Ibarra MD 05/18/21

## 2021-06-08 ENCOUNTER — TELEPHONE (OUTPATIENT)
Dept: PSYCHIATRY | Facility: CLINIC | Age: 46
End: 2021-06-08

## 2021-06-08 NOTE — TELEPHONE ENCOUNTER
Please let her know that she would need to discuss any medication changes with providers at [facility] while she is there  I cannot make any medication changes while she is in PHP

## 2021-06-08 NOTE — TELEPHONE ENCOUNTER
Pt is currently in [facility] program and the recent changes with her meds have not been helping her  Pt states her depression has actually gotten worse   She was hoping she would be able to speak to you briefly since her next appt is not until Sept

## 2021-06-24 ENCOUNTER — TELEPHONE (OUTPATIENT)
Dept: PSYCHIATRY | Facility: CLINIC | Age: 46
End: 2021-06-24

## 2021-06-24 NOTE — TELEPHONE ENCOUNTER
Patient was in a PHP program and they were trying to titrate off the klonopin and start atarax  Patient is down to 2 5 mg for klonopin and is taking 1 tab of the atarax 3 times a day  Asking how to proceed with the taper

## 2021-06-25 NOTE — TELEPHONE ENCOUNTER
Rodlofo Margi's call  Taper of clonazepam was started at [facility], but the program finished before the next decrease  Currently she is taking clonazepam 1 mg tab:  1/2 tab AM, 1 tab lunch and 1 tab HS  We talked about the probable next decrease as decreasing another dose by half tab, 1/2 at lunch instead of a full tab  Reviewed that would be a safe decrease, but she could wait until Dr Jeffery Stack for specific direction from her  Nursing number given to call as needed  She has enough clonazepam and Atarax at this time

## 2021-06-29 ENCOUNTER — OFFICE VISIT (OUTPATIENT)
Dept: INTERNAL MEDICINE CLINIC | Facility: CLINIC | Age: 46
End: 2021-06-29
Payer: MEDICARE

## 2021-06-29 VITALS
HEART RATE: 78 BPM | TEMPERATURE: 97.5 F | SYSTOLIC BLOOD PRESSURE: 108 MMHG | DIASTOLIC BLOOD PRESSURE: 80 MMHG | WEIGHT: 184 LBS | OXYGEN SATURATION: 98 % | HEIGHT: 63 IN | BODY MASS INDEX: 32.6 KG/M2

## 2021-06-29 DIAGNOSIS — Z00.00 MEDICARE ANNUAL WELLNESS VISIT, SUBSEQUENT: ICD-10-CM

## 2021-06-29 DIAGNOSIS — R55 SYNCOPE, UNSPECIFIED SYNCOPE TYPE: Primary | ICD-10-CM

## 2021-06-29 LAB — SL AMB POCT GLUCOSE BLD: 121

## 2021-06-29 PROCEDURE — 93000 ELECTROCARDIOGRAM COMPLETE: CPT | Performed by: NURSE PRACTITIONER

## 2021-06-29 PROCEDURE — 99214 OFFICE O/P EST MOD 30 MIN: CPT | Performed by: NURSE PRACTITIONER

## 2021-06-29 PROCEDURE — G0439 PPPS, SUBSEQ VISIT: HCPCS | Performed by: NURSE PRACTITIONER

## 2021-06-29 PROCEDURE — 82948 REAGENT STRIP/BLOOD GLUCOSE: CPT | Performed by: NURSE PRACTITIONER

## 2021-06-29 RX ORDER — HYDROXYZINE HYDROCHLORIDE 25 MG/1
25 TABLET, FILM COATED ORAL 2 TIMES DAILY
COMMUNITY
Start: 2021-06-09 | End: 2021-11-15 | Stop reason: SDUPTHER

## 2021-06-29 NOTE — ASSESSMENT & PLAN NOTE
Discussed case with Dr Kimani Jesus  Likely vasovagal with volume depletion   Advised patient to significantly increase oral hydration to at least 64 oz of water daily     (normally drinking 12-24oz)  Check baseline labs   Will check 24 hour Holter due to hx of SVT as reported by the patient   EKG normal sinus rhythm, vent rate 64  Random POC glucose 121   Orthostatics negative

## 2021-06-29 NOTE — PROGRESS NOTES
Assessment and Plan:     Problem List Items Addressed This Visit        Cardiovascular and Mediastinum    Syncope - Primary     Discussed case with Dr Justice Acevedo  Likely vasovagal with volume depletion   Advised patient to significantly increase oral hydration to at least 64 oz of water daily  (normally drinking 12-24oz)  Check baseline labs   Will check 24 hour Holter due to hx of SVT as reported by the patient   EKG normal sinus rhythm, vent rate 64  Random POC glucose 121   Orthostatics negative            Relevant Orders    CBC    Comprehensive metabolic panel    Holter monitor - 24 hour    POCT ECG (Completed)    POCT blood glucose (Completed)      Other Visit Diagnoses     Medicare annual wellness visit, subsequent        up to date on preventative care            Preventive health issues were discussed with patient, and age appropriate screening tests were ordered as noted in patient's After Visit Summary  Personalized health advice and appropriate referrals for health education or preventive services given if needed, as noted in patient's After Visit Summary       History of Present Illness:     Patient presents for Medicare Annual Wellness visit    Patient Care Team:  Eddie Mitchell PCP - General (Family Medicine)     Problem List:     Patient Active Problem List   Diagnosis    Screening for metabolic disorder    ADHD, predominantly inattentive type    Bipolar I disorder, most recent episode depressed, moderate ([address] )    Contusion of lower leg    Drug abuse in remission ([address] )    SANA (generalized anxiety disorder)    Vitamin D deficiency    BMI 40 0-44 9, adult ([address] )    Gastropathy    Other specified disorders of arteries and arterioles ([address] )    Other insomnia    Long-term use of high-risk medication    Shortness of breath    Encounter for gynecological examination (general) (routine) without abnormal findings    Morbid obesity ([address] )    Encounter for pre-bariatric surgery counseling and education    IUD contraception    Postsurgical malabsorption    Status post laparoscopic sleeve gastrectomy    Umbilical hernia without obstruction and without gangrene    Exposure to COVID-19 virus    Left breast mass    Syncope      Past Medical and Surgical History:     Past Medical History:   Diagnosis Date    Acute pancreatitis     Last Assessed: 2016; due to gallstones (removed)    Anemia     Anxiety     Arthralgia     Concussion     Last Assessed:     Endometriosis     Esophageal reflux     Familial combined hyperlipidemia     Last Assessed: [id number]    Gastropathy     Head injury     History of sinusitis     Irregular heart beat     Migraine     Last Assessed: 2012    [facility]ox[facility]     Peptic ulcer disease     Spontaneous      Substance abuse ([facility]     Suicide attempt ([facility] )     UTI (urinary tract infection)     Vitamin D deficiency      Past Surgical History:   Procedure Laterality Date    BARIATRIC SURGERY  2020    sleeve     SECTION      FOREARM SURGERY Left     INDUCED       x3    LAPAROSCOPIC CHOLECYSTECTOMY      Last Assessed: 2016    LAPAROSCOPY      Exploratory    SLEEVE GASTROPLASTY      TONSILLECTOMY      Onset: [id number]      Family History:     Family History   Problem Relation Age of Onset    Hyperthyroidism Mother     Thyroid disease Mother     Depression Father     Hypertension Father     Obesity Father     Other Paternal Grandmother         Cardiac Disorder    Dementia Paternal Grandmother     Hypertension Paternal Grandmother     Diabetes Paternal Grandmother     Hypertension Paternal Grandfather     Other Paternal Grandfather         Cardiac Disorder    Diabetes Paternal Grandfather     Schizophrenia Other     Schizophrenia Other     Suicidality Other     Completed Suicide  Other     Breast cancer Other     Hypertension Paternal Aunt     No Known Problems Maternal Grandmother     No Known Problems Maternal Grandfather     No Known Problems Son     No Known Problems Son     No Known Problems Brother     No Known Problems Maternal Aunt     Stroke Neg Hx     Drug abuse Neg Hx       Social History:     Social History     Socioeconomic History    Marital status: /Civil Union     Spouse name: None    Number of children: 2    Years of education: bachelor's degree    Highest education level: Bachelor's degree (e g , BA, AB, BS)   Occupational History    Occupation: on disability   Tobacco Use    Smoking status: Current Every Day Smoker     Packs/day: 0 25     Years: 3 00     Pack years: 0 75     Types: Cigarettes     Last attempt to quit: 2020     Years since quittin 4    Smokeless tobacco: Never Used    Tobacco comment: Quit   Vaping Use    Vaping Use: Never used   Substance and Sexual Activity    Alcohol use: Yes     Comment: social    Drug use: No     Comment: Past cocaine and marijuana use for 2 years until   Also tried LSD years ago   No current recreational drug use    Sexual activity: Yes     Partners: Male     Birth control/protection: Male Sterilization   Other Topics Concern    None   Social History Narrative    Education: bachelor's degree in psychology; currently in [employer] degree program in Social Work at [address] [place] [address] Ave: none    Marital History:  (second marriage)    Children: 2 sons    Living Arrangement: lives in home with  and 2 sons    Occupational History: currently unemployed; 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, on permanent disability    Functioning Relationships: good support system,  is supportive    Legal History: none     History: None     Social Determinants of Health     Financial Resource Strain: Low Risk     Difficulty of Paying Living Expenses: Not hard at all   Food Insecurity: No Food Insecurity    Worried About [facility] in the Last Year: Never true    Ran Out of Food in the Last Year: Never true   Transportation Needs: No Transportation Needs    Lack of Transportation (Medical): No    Lack of Transportation (Non-Medical): No   Physical Activity: Sufficiently Active    Days of Exercise per Week: 3 days    Minutes of Exercise per Session: 60 min   Stress: Stress Concern Present    Feeling of Stress : To some extent   Social Connections:  Moderately Isolated    Frequency of Communication with Friends and Family: More than three times a week    Frequency of Social Gatherings with Friends and Family: More than three times a week    Attends Orthodoxy Services: Never    Active Member of Clubs or Organizations: No    Attends Club or Organization Meetings: Never    Marital Status:    Intimate Partner Violence: Not At Risk    Fear of Current or Ex-Partner: No    Emotionally Abused: No    Physically Abused: No    Sexually Abused: No      Medications and Allergies:     Current Outpatient Medications   Medication Sig Dispense Refill    ARIPiprazole (ABILIFY) 15 mg tablet Take 1 tablet (15 mg total) by mouth daily at bedtime (Patient taking differently: Take 15 mg by mouth 1 5 tablets daily) 30 tablet 4    atoMOXetine (STRATTERA) 60 mg capsule Take 1 capsule (60 mg total) by mouth daily 30 capsule 4    carBAMazepine (TEGretol) 200 mg tablet Take 1 tablet (200 mg total) by mouth 2 (two) times a day 60 tablet 4    [START ON 7/9/2021] clonazePAM (KlonoPIN) 1 mg tablet Take 1 tablet (1 mg total) by mouth 3 (three) times a day To be filled on or after  7/9/21 (Patient taking differently: Take 1 mg by mouth Take 0 5 tablets bid) 90 tablet 3    Cyanocobalamin (B-12) 1000 MCG SUBL Place under the tongue daily      escitalopram (LEXAPRO) 10 mg tablet Take 1 5 tablets (15 mg total) by mouth daily at bedtime 45 tablet 4    hydrOXYzine HCL (ATARAX) 25 mg tablet 1-2 tabs po every 6 hrs prn for anxiety      lamoTRIgine (LaMICtal) 200 MG tablet Take 2 tablets (400 mg total) by mouth daily at bedtime 60 tablet 4    Multiple Vitamins-Minerals (HAIR SKIN NAILS PO) Take by mouth daily      pantoprazole (PROTONIX) 40 mg tablet daily       traZODone (DESYREL) 50 mg tablet Take 1-2 tablets ( mg total) by mouth daily at bedtime as needed for sleep 60 tablet 4     No current facility-administered medications for this visit  Allergies   Allergen Reactions    Geodon [Ziprasidone]      Nystagmus    Quetiapine      Other reaction(s): Other (See Comments)  Per pt low blood pressure and fainting    Other      Seasonal allergies      Immunizations:     Immunization History   Administered Date(s) Administered    Hep B, adult 10/15/2015, 11/16/2015    INFLUENZA 10/13/2015, 12/13/2018, 10/21/2019    Influenza, seasonal, injectable 10/23/2014, 10/13/2015, 10/21/2019    SARS-CoV-2 / COVID-19 mRNA IM (Pfizer-BioNTech) 04/15/2021, 05/11/2021    Tdap 10/15/2015    Tuberculin Skin Test-PPD Intradermal 12/15/2015      Health Maintenance:         Topic Date Due    Hepatitis C Screening  Never done    Cervical Cancer Screening  01/27/2021    HIV Screening  Completed         Topic Date Due    Pneumococcal Vaccine: Pediatrics (0 to 5 Years) and At-Risk Patients (6 to 59 Years) (1 of 2 - PPSV23) Never done    Influenza Vaccine (Season Ended) 09/01/2021      Medicare Health Risk Assessment:     /80 (BP Location: Left arm, Patient Position: Standing, Cuff Size: Standard)   Pulse 78   Temp 97 5 °F (36 4 °C)   Ht 5' 3" (1 6 m)   Wt 83 5 kg (184 lb)   SpO2 98%   BMI 32 59 kg/m²      Eliseo Spain is here for her Subsequent Wellness visit  Health Risk Assessment:   Patient rates overall health as very good  Patient feels that their physical health rating is much better  Patient is satisfied with their life  Eyesight was rated as slightly worse  Hearing was rated as same  Patient feels that their emotional and mental health rating is slightly worse  Patients states they are never, rarely angry  Patient states they are sometimes unusually tired/fatigued  Pain experienced in the last 7 days has been none  Patient states that she has experienced weight loss or gain in last 6 months  Depression Screening:   PHQ-2 Score: 3  PHQ-9 Score: 13      Fall Risk Screening: In the past year, patient has experienced: history of falling in past year    Number of falls: 2 or more  Injured during fall?: No    Feels unsteady when standing or walking?: No    Worried about falling?: No      Urinary Incontinence Screening:   Patient has leaked urine accidently in the last six months  Home Safety:  Patient does not have trouble with stairs inside or outside of their home  Patient has working smoke alarms and has working carbon monoxide detector  Home safety hazards include: none  Nutrition:   Current diet is Other (please comment)  Medications:   Patient is currently taking over-the-counter supplements  OTC medications include: see medication list  Patient is able to manage medications  Activities of Daily Living (ADLs)/Instrumental Activities of Daily Living (IADLs):   Walk and transfer into and out of bed and chair?: Yes  Dress and groom yourself?: Yes    Bathe or shower yourself?: Yes    Feed yourself?  Yes  Do your laundry/housekeeping?: Yes  Manage your money, pay your bills and track your expenses?: Yes  Make your own meals?: Yes    Do your own shopping?: Yes    Previous Hospitalizations:   Any hospitalizations or ED visits within the last 12 months?: No      Advance Care Planning:   Living will: No    Durable POA for healthcare: No    Advanced directive: No      PREVENTIVE SCREENINGS      Cardiovascular Screening:    General: Screening Current      Diabetes Screening:     General: Screening Current      Colorectal Cancer Screening:     General: Screening Not Indicated      Breast Cancer Screening:     General: Screening Current      Cervical Cancer Screening:    General: Screening Current      Osteoporosis Screening:    General: Screening Not Indicated      Abdominal Aortic Aneurysm (AAA) Screening:        General: Screening Not Indicated      Lung Cancer Screening:     General: Screening Not Indicated      Hepatitis C Screening:    General: Screening Not Indicated    Screening, Brief Intervention, and Referral to Treatment (SBIRT)    Screening  Typical number of drinks in a day: 1  Typical number of drinks in a week: 2  Interpretation: Low risk drinking behavior        HARRIET Jimenez

## 2021-06-29 NOTE — PATIENT INSTRUCTIONS
Increase oral hydration to at least 64 oz of water a day   Can have 1 Gatorade a day  Avoid caffeine  Check Holter Monitor and labs   Follow up in about 2-3 weeks after Holter and labs completed

## 2021-06-29 NOTE — PROGRESS NOTES
Assessment/Plan:    Problem List Items Addressed This Visit        Cardiovascular and Mediastinum    Syncope - Primary     Discussed case with Dr Mushtaq Mullins  Likely vasovagal with volume depletion   Advised patient to significantly increase oral hydration to at least 64 oz of water daily  (normally drinking 12-24oz)  Check baseline labs   Will check 24 hour Holter due to hx of SVT as reported by the patient   EKG normal sinus rhythm, vent rate 64  Random POC glucose 121   Orthostatics negative            Relevant Orders    CBC    Comprehensive metabolic panel    Holter monitor - 24 hour    POCT ECG (Completed)    POCT blood glucose (Completed)            M*Modal software was used to dictate this note  It may contain errors with dictating incorrect words or incorrect spelling  Please contact the provider directly with any questions  Subjective:      Patient ID: Smith Rodriguez is a 39 y o  female  HPI     Patient presents today with concern for fainting twice 3 days ago  She states that she was sitting on her living room floor and stood up and as she stood up her  told her that her eyes rolled back and she fell backwards, he was able to catch her before she hit anything and lower her to the ground  She denies any symptoms before fainting  She states she woke up spontaneously after a few seconds  She states she had no recollection of the event that happened  She was sitting on the floor for about 2-3 minutes and then went to stand up again, took 2-3 steps and then passed out again and her  caught her again and put her in a chair  Her  had her drink gatorade, she did but then vomited  She went up to bed then and went to sleep  She did sleep throughout the night  Sunday when she woke up, she states her head felt "fuzzy" like she was in a brainfog  She felt her heart was racing and "thumping" Associated nausea    Yesterday she was at a friends house and stood quickly from a standing position and felt very dizzy and had to hold onto something to prevent from falling  She states she drinks hardly any fluid a day  She estimates 12-24oz a day  She eats throughout the day  2 drinks on the weekends  The following portions of the patient's history were reviewed and updated as appropriate: allergies, current medications, past family history, past medical history, past social history, past surgical history and problem list     Review of Systems   Constitutional: Negative for chills and fever  Eyes: Negative for visual disturbance  Respiratory: Negative for cough, chest tightness, shortness of breath and wheezing  Cardiovascular: Positive for palpitations  Negative for chest pain and leg swelling  Gastrointestinal: Positive for nausea  Negative for vomiting  Neurological: Positive for dizziness and light-headedness  Negative for headaches  Psychiatric/Behavioral:        Recently discharged from partial hospitalization program due to exacerbation of her bipolar disorder  She was having suicidal ideation but denies it at this time            Past Medical History:   Diagnosis Date    Acute pancreatitis     Last Assessed: 2016; due to gallstones (removed)    Anemia     Anxiety     Arthralgia     Concussion     Last Assessed: 2012    Endometriosis     Esophageal reflux     Familial combined hyperlipidemia     Last Assessed: 2013    Gastropathy     Head injury     History of sinusitis     Irregular heart beat     Migraine     Last Assessed: 2012    Paroxysmal supraventricular tachycardia (HCC)     Peptic ulcer disease     Spontaneous      Sub[facility]     Suicide attempt ([facility] )     UTI (urinary tract infection)     Vitamin D deficiency          Current Outpatient Medications:     ARIPiprazole (ABILIFY) 15 mg tablet, Take 1 tablet (15 mg total) by mouth daily at bedtime (Patient taking differently: Take 15 mg by mouth 1 5 tablets daily), Disp: 30 tablet, Rfl: 4    atoMOXetine (STRATTERA) 60 mg capsule, Take 1 capsule (60 mg total) by mouth daily, Disp: 30 capsule, Rfl: 4    carBAMazepine (TEGretol) 200 mg tablet, Take 1 tablet (200 mg total) by mouth 2 (two) times a day, Disp: 60 tablet, Rfl: 4    [START ON 2021] clonazePAM (KlonoPIN) 1 mg tablet, Take 1 tablet (1 mg total) by mouth 3 (three) times a day To be filled on or after  21 (Patient taking differently: Take 1 mg by mouth Take 0 5 tablets bid), Disp: 90 tablet, Rfl: 3    Cyanocobalamin (B-12) 1000 MCG SUBL, Place under the tongue daily, Disp: , Rfl:     escitalopram (LEXAPRO) 10 mg tablet, Take 1 5 tablets (15 mg total) by mouth daily at bedtime, Disp: 45 tablet, Rfl: 4    hydrOXYzine HCL (ATARAX) 25 mg tablet, 1-2 tabs po every 6 hrs prn for anxiety, Disp: , Rfl:     lamoTRIgine (LaMICtal) 200 MG tablet, Take 2 tablets (400 mg total) by mouth daily at bedtime, Disp: 60 tablet, Rfl: 4    Multiple Vitamins-Minerals (HAIR SKIN NAILS PO), Take by mouth daily, Disp: , Rfl:     pantoprazole (PROTONIX) 40 mg tablet, daily , Disp: , Rfl:     traZODone (DESYREL) 50 mg tablet, Take 1-2 tablets ( mg total) by mouth daily at bedtime as needed for sleep, Disp: 60 tablet, Rfl: 4    Allergies   Allergen Reactions    Geodon [Ziprasidone]      Nystagmus    Quetiapine      Other reaction(s):  Other (See Comments)  Per pt low blood pressure and fainting    Other      Seasonal allergies       Social History   Past Surgical History:   Procedure Laterality Date    BARIATRIC SURGERY  2020    sleeve     SECTION      FOREARM SURGERY Left     INDUCED       x3    LAPAROSCOPIC CHOLECYSTECTOMY      Last Assessed: 2016    LAPAROSCOPY      Exploratory    SLEEVE GASTROPLASTY      TONSILLECTOMY      Onset: [id number]     Family History   Problem Relation Age of Onset    Hyperthyroidism Mother     Thyroid disease Mother     Depression Father    Alejandra Nine Hypertension Father     Obesity Father     Other Paternal Grandmother         Cardiac Disorder    Dementia Paternal Grandmother     Hypertension Paternal Grandmother     Diabetes Paternal Grandmother     Hypertension Paternal Grandfather     Other Paternal Grandfather         Cardiac Disorder    Diabetes Paternal Grandfather     Schizophrenia Other     Schizophrenia Other     Suicidality Other     Completed Suicide  Other     Breast cancer Other     Hypertension Paternal Aunt     No Known Problems Maternal Grandmother     No Known Problems Maternal Grandfather     No Known Problems Son     No Known Problems Son     No Known Problems Brother     No Known Problems Maternal Aunt     Stroke Neg Hx     Drug abuse Neg Hx        Objective:  /80 (BP Location: Left arm, Patient Position: Standing, Cuff Size: Standard)   Pulse 78   Temp 97 5 °F (36 4 °C)   Ht 5' 3" (1 6 m)   Wt 83 5 kg (184 lb)   SpO2 98%   BMI 32 59 kg/m²      Physical Exam  Vitals reviewed  Constitutional:       General: She is not in acute distress  Appearance: Normal appearance  She is well-developed  She is not diaphoretic  HENT:      Head: Normocephalic and atraumatic  Right Ear: Tympanic membrane and external ear normal       Left Ear: Tympanic membrane and external ear normal       Nose: Nose normal       Mouth/Throat:      Mouth: Mucous membranes are moist       Pharynx: Oropharynx is clear  No oropharyngeal exudate or posterior oropharyngeal erythema  Eyes:      Extraocular Movements: Extraocular movements intact  Conjunctiva/sclera: Conjunctivae normal       Pupils: Pupils are equal, round, and reactive to light  Neck:      Vascular: No carotid bruit  Cardiovascular:      Rate and Rhythm: Normal rate and regular rhythm  Heart sounds: Normal heart sounds  No murmur heard  Pulmonary:      Effort: Pulmonary effort is normal  No respiratory distress        Breath sounds: Normal breath sounds  No decreased breath sounds, wheezing, rhonchi or rales  Abdominal:      General: Bowel sounds are normal  There is no distension  Palpations: Abdomen is soft  Tenderness: There is no abdominal tenderness  Musculoskeletal:      Cervical back: Normal range of motion and neck supple  Right lower leg: No edema  Left lower leg: No edema  Lymphadenopathy:      Cervical: No cervical adenopathy  Skin:     General: Skin is warm and dry  Neurological:      General: No focal deficit present  Mental Status: She is alert and oriented to person, place, and time  Mental status is at baseline  Cranial Nerves: No cranial nerve deficit  Motor: Motor function is intact  No weakness  Coordination: Coordination is intact  Romberg sign negative   Coordination normal  Finger-Nose-Finger Test and Heel to [facility] Test normal  Rapid alternating movements normal       Gait: Gait normal    Psychiatric:         Mood and Affect: Mood normal          Behavior: Behavior normal

## 2021-08-09 ENCOUNTER — TELEPHONE (OUTPATIENT)
Dept: PSYCHIATRY | Facility: CLINIC | Age: 46
End: 2021-08-09

## 2021-08-09 NOTE — TELEPHONE ENCOUNTER
Patient contacted office requesting refill of lamictal  Called patient and lvm to inform Rx still has refills

## 2021-10-18 ENCOUNTER — TELEPHONE (OUTPATIENT)
Dept: PSYCHIATRY | Facility: CLINIC | Age: 46
End: 2021-10-18

## 2021-10-26 ENCOUNTER — OFFICE VISIT (OUTPATIENT)
Dept: INTERNAL MEDICINE CLINIC | Facility: CLINIC | Age: 46
End: 2021-10-26
Payer: COMMERCIAL

## 2021-10-26 VITALS
BODY MASS INDEX: 32.85 KG/M2 | RESPIRATION RATE: 20 BRPM | DIASTOLIC BLOOD PRESSURE: 78 MMHG | TEMPERATURE: 98.8 F | HEIGHT: 63 IN | OXYGEN SATURATION: 98 % | HEART RATE: 65 BPM | SYSTOLIC BLOOD PRESSURE: 116 MMHG | WEIGHT: 185.4 LBS

## 2021-10-26 DIAGNOSIS — M62.830 BACK MUSCLE SPASM: ICD-10-CM

## 2021-10-26 DIAGNOSIS — R07.81 RIB PAIN ON LEFT SIDE: ICD-10-CM
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
## 2021-10-26 DIAGNOSIS — Z87.820 HISTORY OF MULTIPLE CONCUSSIONS: ICD-10-CM

## 2021-10-26 DIAGNOSIS — F07.81 POST CONCUSSION SYNDROME: Primary | ICD-10-CM

## 2021-10-26 PROBLEM — R55 SYNCOPE: Status: RESOLVED | Noted: 2021-06-29 | Resolved: 2021-10-26
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
## 2021-10-26 PROBLEM — Z20.822 EXPOSURE TO COVID-19 VIRUS: Status: RESOLVED | Noted: 2020-11-18 | Resolved: 2021-10-26

## 2021-10-26 PROBLEM — R06.02 SHORTNESS OF BREATH: Status: RESOLVED | Noted: 2018-11-30 | Resolved: 2021-10-26

## 2021-10-26 PROCEDURE — 99213 OFFICE O/P EST LOW 20 MIN: CPT | Performed by: INTERNAL MEDICINE

## 2021-10-26 RX ORDER — METHOCARBAMOL 500 MG/1
500 TABLET, FILM COATED ORAL EVERY 6 HOURS PRN
Qty: 30 TABLET | Refills: 0 | Status: SHIPPED | OUTPATIENT
Start: 2021-10-26 | End: 2022-01-07 | Stop reason: ALTCHOICE

## 2021-10-28 ENCOUNTER — EVALUATION (OUTPATIENT)
Dept: PHYSICAL THERAPY | Facility: CLINIC | Age: 46
End: 2021-10-28
Payer: MEDICARE
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
## 2021-10-28 DIAGNOSIS — F07.81 POST CONCUSSION SYNDROME: ICD-10-CM

## 2021-10-28 DIAGNOSIS — Z87.820 HISTORY OF MULTIPLE CONCUSSIONS: ICD-10-CM

## 2021-10-28 PROCEDURE — 97162 PT EVAL MOD COMPLEX 30 MIN: CPT

## 2021-10-28 PROCEDURE — 97112 NEUROMUSCULAR REEDUCATION: CPT

## 2021-10-30 DIAGNOSIS — F31.32 BIPOLAR I DISORDER, MOST RECENT EPISODE DEPRESSED, MODERATE (HCC): Primary | Chronic | ICD-10-CM

## 2021-10-30 RX ORDER — LAMOTRIGINE 200 MG/1
400 TABLET ORAL
Qty: 60 TABLET | Refills: 4 | Status: SHIPPED | OUTPATIENT
Start: 2021-10-30 | End: 2022-01-07 | Stop reason: SDUPTHER

## 2021-11-02 ENCOUNTER — OFFICE VISIT (OUTPATIENT)
Dept: INTERNAL MEDICINE CLINIC | Facility: CLINIC | Age: 46
End: 2021-11-02
Payer: MEDICARE

## 2021-11-02 VITALS
WEIGHT: 185 LBS | BODY MASS INDEX: 32.78 KG/M2 | HEIGHT: 63 IN | DIASTOLIC BLOOD PRESSURE: 80 MMHG | HEART RATE: 69 BPM | OXYGEN SATURATION: 98 % | SYSTOLIC BLOOD PRESSURE: 112 MMHG | TEMPERATURE: 98.4 F

## 2021-11-02 DIAGNOSIS — F07.81 POST CONCUSSION SYNDROME: Primary | ICD-10-CM

## 2021-11-02 DIAGNOSIS — F31.32 BIPOLAR I DISORDER, MOST RECENT EPISODE DEPRESSED, MODERATE (HCC): Primary | Chronic | ICD-10-CM

## 2021-11-02 PROCEDURE — 99213 OFFICE O/P EST LOW 20 MIN: CPT | Performed by: INTERNAL MEDICINE

## 2021-11-03 RX ORDER — ESCITALOPRAM OXALATE 10 MG/1
15 TABLET ORAL
Qty: 45 TABLET | Refills: 4 | Status: SHIPPED | OUTPATIENT
Start: 2021-11-03 | End: 2022-01-07 | Stop reason: SDUPTHER

## 2021-11-08 ENCOUNTER — OFFICE VISIT (OUTPATIENT)
Dept: PHYSICAL THERAPY | Facility: CLINIC | Age: 46
End: 2021-11-08
Payer: MEDICARE

## 2021-11-08 ENCOUNTER — TELEPHONE (OUTPATIENT)
Dept: INTERNAL MEDICINE CLINIC | Facility: CLINIC | Age: 46
End: 2021-11-08

## 2021-11-08 ENCOUNTER — EVALUATION (OUTPATIENT)
Dept: SPEECH THERAPY | Facility: CLINIC | Age: 46
End: 2021-11-08
Payer: MEDICARE

## 2021-11-08 DIAGNOSIS — F07.81 POST CONCUSSION SYNDROME: ICD-10-CM

## 2021-11-08 DIAGNOSIS — R48.8 OTHER SYMBOLIC DYSFUNCTIONS: Primary | ICD-10-CM
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
## 2021-11-08 DIAGNOSIS — F07.81 POST CONCUSSION SYNDROME: Primary | ICD-10-CM

## 2021-11-08 DIAGNOSIS — Z87.820 HISTORY OF MULTIPLE CONCUSSIONS: ICD-10-CM

## 2021-11-08 PROCEDURE — 92523 SPEECH SOUND LANG COMPREHEN: CPT

## 2021-11-08 PROCEDURE — 97112 NEUROMUSCULAR REEDUCATION: CPT

## 2021-11-08 PROCEDURE — 96125 COGNITIVE TEST BY HC PRO: CPT

## 2021-11-15 ENCOUNTER — APPOINTMENT (OUTPATIENT)
Dept: SPEECH THERAPY | Facility: CLINIC | Age: 46
End: 2021-11-15
Payer: MEDICARE

## 2021-11-15 DIAGNOSIS — F41.1 GAD (GENERALIZED ANXIETY DISORDER): Primary | ICD-10-CM

## 2021-11-15 RX ORDER — HYDROXYZINE HYDROCHLORIDE 25 MG/1
25-50 TABLET, FILM COATED ORAL EVERY 6 HOURS PRN
Qty: 120 TABLET | Refills: 3 | Status: SHIPPED | OUTPATIENT
Start: 2021-11-15 | End: 2022-01-07 | Stop reason: SDUPTHER

## 2021-11-16 ENCOUNTER — OFFICE VISIT (OUTPATIENT)
Dept: SPEECH THERAPY | Facility: CLINIC | Age: 46
End: 2021-11-16
Payer: MEDICARE

## 2021-11-16 DIAGNOSIS — R48.8 OTHER SYMBOLIC DYSFUNCTIONS: Primary | ICD-10-CM

## 2021-11-16 DIAGNOSIS — F07.81 POST CONCUSSION SYNDROME: ICD-10-CM

## 2021-11-16 PROCEDURE — 92507 TX SP LANG VOICE COMM INDIV: CPT

## 2021-11-17 ENCOUNTER — OFFICE VISIT (OUTPATIENT)
Dept: PHYSICAL THERAPY | Facility: CLINIC | Age: 46
End: 2021-11-17
Payer: MEDICARE

## 2021-11-17 DIAGNOSIS — Z87.820 HISTORY OF MULTIPLE CONCUSSIONS: ICD-10-CM

## 2021-11-17 DIAGNOSIS — F07.81 POST CONCUSSION SYNDROME: Primary | ICD-10-CM
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
## 2021-11-17 PROCEDURE — 97112 NEUROMUSCULAR REEDUCATION: CPT

## 2021-11-24 ENCOUNTER — APPOINTMENT (OUTPATIENT)
Dept: PHYSICAL THERAPY | Facility: CLINIC | Age: 46
End: 2021-11-24
Payer: MEDICARE

## 2021-11-24 ENCOUNTER — APPOINTMENT (OUTPATIENT)
Dept: SPEECH THERAPY | Facility: CLINIC | Age: 46
End: 2021-11-24
Payer: MEDICARE

## 2021-11-26 ENCOUNTER — APPOINTMENT (OUTPATIENT)
Dept: PHYSICAL THERAPY | Facility: CLINIC | Age: 46
End: 2021-11-26
Payer: MEDICARE

## 2021-11-26 ENCOUNTER — TELEPHONE (OUTPATIENT)
Dept: SPEECH THERAPY | Facility: CLINIC | Age: 46
End: 2021-11-26

## 2021-11-29 ENCOUNTER — OFFICE VISIT (OUTPATIENT)
Dept: PHYSICAL THERAPY | Facility: CLINIC | Age: 46
End: 2021-11-29
Payer: MEDICARE

## 2021-11-29 ENCOUNTER — OFFICE VISIT (OUTPATIENT)
Dept: SPEECH THERAPY | Facility: CLINIC | Age: 46
End: 2021-11-29
Payer: MEDICARE

## 2021-11-29 DIAGNOSIS — F07.81 POST CONCUSSION SYNDROME: ICD-10-CM
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
## 2021-11-29 DIAGNOSIS — Z87.820 HISTORY OF MULTIPLE CONCUSSIONS: ICD-10-CM

## 2021-11-29 DIAGNOSIS — R48.8 OTHER SYMBOLIC DYSFUNCTIONS: Primary | ICD-10-CM

## 2021-11-29 DIAGNOSIS — F07.81 POST CONCUSSION SYNDROME: Primary | ICD-10-CM

## 2021-11-29 PROCEDURE — 97112 NEUROMUSCULAR REEDUCATION: CPT

## 2021-11-29 PROCEDURE — 92507 TX SP LANG VOICE COMM INDIV: CPT

## 2021-12-08 ENCOUNTER — TELEPHONE (OUTPATIENT)
Dept: SPEECH THERAPY | Facility: CLINIC | Age: 46
End: 2021-12-08

## 2022-01-03 ENCOUNTER — TELEPHONE (OUTPATIENT)
Dept: SPEECH THERAPY | Facility: CLINIC | Age: 47
End: 2022-01-03

## 2022-01-03 NOTE — TELEPHONE ENCOUNTER
Therapist contacted Patient via phone call on 01/03/2022 to discuss scheduling and POC  A message was left with the clinic's phone number

## 2022-01-06 ENCOUNTER — TELEPHONE (OUTPATIENT)
Dept: PSYCHIATRY | Facility: CLINIC | Age: 47
End: 2022-01-06

## 2022-01-06 NOTE — TELEPHONE ENCOUNTER
Layo Chaudhari called nursing line and LM   She is requesting a "therapeutic clinician"     Layo Chaudhari cell # [phone]  Work # [phone] ext [phone]

## 2022-01-06 NOTE — TELEPHONE ENCOUNTER
She can be scheduled for a virtual visit today either at 2:30 PM or 3 PM or 3:30 PM (I had cancellations)

## 2022-01-06 NOTE — TELEPHONE ENCOUNTER
Patient would like to know if she can make an appt for today  Having problems with bio polar   Can you give her a call at work she can not come in today

## 2022-01-07 ENCOUNTER — OFFICE VISIT (OUTPATIENT)
Dept: PSYCHIATRY | Facility: CLINIC | Age: 47
End: 2022-01-07
Payer: MEDICARE

## 2022-01-07 VITALS
DIASTOLIC BLOOD PRESSURE: 81 MMHG | WEIGHT: 192 LBS | HEART RATE: 78 BPM | HEIGHT: 63 IN | SYSTOLIC BLOOD PRESSURE: 144 MMHG | BODY MASS INDEX: 34.02 KG/M2

## 2022-01-07 DIAGNOSIS — Z79.899 LONG-TERM USE OF HIGH-RISK MEDICATION: Chronic | ICD-10-CM

## 2022-01-07 DIAGNOSIS — G47.09 OTHER INSOMNIA: Chronic | ICD-10-CM

## 2022-01-07 DIAGNOSIS — F90.0 ADHD, PREDOMINANTLY INATTENTIVE TYPE: Chronic | ICD-10-CM

## 2022-01-07 DIAGNOSIS — F31.32 BIPOLAR I DISORDER, MOST RECENT EPISODE DEPRESSED, MODERATE (HCC): Primary | Chronic | ICD-10-CM

## 2022-01-07 DIAGNOSIS — F41.1 GAD (GENERALIZED ANXIETY DISORDER): Chronic | ICD-10-CM

## 2022-01-07 PROBLEM — M75.81 TENDINITIS OF RIGHT ROTATOR CUFF: Status: ACTIVE | Noted: 2021-10-25

## 2022-01-07 PROBLEM — I47.1 PAROXYSMAL SUPRAVENTRICULAR TACHYCARDIA (HCC): Status: ACTIVE | Noted: 2021-10-25

## 2022-01-07 PROBLEM — Z99.89 OBSTRUCTIVE SLEEP APNEA TREATED WITH CONTINUOUS POSITIVE AIRWAY PRESSURE (CPAP): Status: ACTIVE | Noted: 2021-10-25

## 2022-01-07 PROBLEM — I47.10 PAROXYSMAL SUPRAVENTRICULAR TACHYCARDIA: Status: ACTIVE | Noted: 2021-10-25

## 2022-01-07 PROBLEM — M75.51 SUBACROMIAL BURSITIS OF RIGHT SHOULDER JOINT: Status: ACTIVE | Noted: 2021-10-25

## 2022-01-07 PROBLEM — G47.33 OBSTRUCTIVE SLEEP APNEA TREATED WITH CONTINUOUS POSITIVE AIRWAY PRESSURE (CPAP): Status: ACTIVE | Noted: 2021-10-25

## 2022-01-07 PROCEDURE — 90833 PSYTX W PT W E/M 30 MIN: CPT | Performed by: PSYCHIATRY & NEUROLOGY

## 2022-01-07 PROCEDURE — 99214 OFFICE O/P EST MOD 30 MIN: CPT | Performed by: PSYCHIATRY & NEUROLOGY

## 2022-01-07 RX ORDER — TRAZODONE HYDROCHLORIDE 50 MG/1
50-100 TABLET ORAL
Qty: 60 TABLET | Refills: 4 | Status: SHIPPED | OUTPATIENT
Start: 2022-01-07 | End: 2022-05-19 | Stop reason: SDUPTHER

## 2022-01-07 RX ORDER — ARIPIPRAZOLE 20 MG/1
20 TABLET ORAL
Qty: 30 TABLET | Refills: 4 | Status: SHIPPED | OUTPATIENT
Start: 2022-01-07 | End: 2022-03-31

## 2022-01-07 RX ORDER — ATOMOXETINE 60 MG/1
60 CAPSULE ORAL DAILY
Qty: 30 CAPSULE | Refills: 4 | Status: SHIPPED | OUTPATIENT
Start: 2022-01-07 | End: 2022-05-19 | Stop reason: SDUPTHER

## 2022-01-07 RX ORDER — CARBAMAZEPINE 200 MG/1
200 TABLET ORAL 2 TIMES DAILY
Qty: 60 TABLET | Refills: 4 | Status: SHIPPED | OUTPATIENT
Start: 2022-01-07 | End: 2022-05-19 | Stop reason: SDUPTHER

## 2022-01-07 RX ORDER — LAMOTRIGINE 200 MG/1
400 TABLET ORAL
Qty: 60 TABLET | Refills: 4 | Status: SHIPPED | OUTPATIENT
Start: 2022-01-07 | End: 2022-05-19 | Stop reason: SDUPTHER

## 2022-01-07 RX ORDER — HYDROXYZINE HYDROCHLORIDE 25 MG/1
25 TABLET, FILM COATED ORAL 3 TIMES DAILY
Qty: 90 TABLET | Refills: 4 | Status: SHIPPED | OUTPATIENT
Start: 2022-01-07 | End: 2022-05-19 | Stop reason: SDUPTHER

## 2022-01-07 RX ORDER — ESCITALOPRAM OXALATE 20 MG/1
20 TABLET ORAL
Qty: 30 TABLET | Refills: 4 | Status: SHIPPED | OUTPATIENT
Start: 2022-01-07 | End: 2022-04-21 | Stop reason: SDUPTHER

## 2022-01-07 NOTE — BH TREATMENT PLAN
TREATMENT PLAN (Medication Management Only)        [facility]    Name/Date of Birth/MRN:  Nadira Hubbard 42 y o  1975 MRN: [id number]  Date of Treatment Plan: January 7, 2022  Diagnosis/Diagnoses:   1  Bipolar I disorder, most recent episode depressed, moderate ([address] )    2  SANA (generalized anxiety disorder)    3  ADHD, predominantly inattentive type    4  Other insomnia    5  Long-term use of high-risk medication      Strengths/Personal Resources for Self-Care: "I am very self-aware"  Area/Areas of need (in own words): "being more open and honest with other people"  1  Long Term Goal:   improve control of anxiety, improve control of depression, help with mood stability  Target Date: 2 months - 3/7/2022  Person/Persons responsible for completion of goal: Margi Villanueva  Short Term Objective (s) - How will we reach this goal?:   A  Provider new recommended medication/dosage changes and/or continue medication(s): increase Lexapro and Abilify, continue all other medications (Trazodone, Tegretol, Lamictal and Atarax)  B   N/A   C   N/A  Target Date: 2 months - 3/7/2022  Person/Persons Responsible for Completion of Goal: Analisa Payton   Progress Towards Goals: regressed  Treatment Modality: medication management every 2 months, referral for individual psychotherapy  Review due 180 days from date of this plan: 6 months - 7/7/2022  Expected length of service: ongoing treatment unless revised  My Physician/PA/NP and I have developed this plan together and I agree to work on the goals and objectives  I understand the treatment goals that were developed for my treatment    Electronic Signatures: on file (unless signed below)    Mendel Chase, MD 01/07/22
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
## 2022-02-11 NOTE — TELEPHONE ENCOUNTER
Received a message from Juanito  She is out of her non-stimulant ADHD medication and is requesting a stimulant instead  She is not getting through the work day and school in the evenings  Returned her call:  Rossy Rodrigues and will forward her message for review

## 2022-02-13 NOTE — TELEPHONE ENCOUNTER
Please let Layo Chaudhari know that I can only prescribe non-stimulant medications for her due to her diagnosis and history  This can be reviewed with her at her next appointment in March

## 2022-02-14 NOTE — TELEPHONE ENCOUNTER
Spoke with Barbra Adler and reviewed feedback from Dr Marcela Adler easily accepted direction and has refills of Strattera

## 2022-02-15 ENCOUNTER — TELEPHONE (OUTPATIENT)
Dept: PSYCHIATRY | Facility: CLINIC | Age: 47
End: 2022-02-15

## 2022-03-29 ENCOUNTER — TELEPHONE (OUTPATIENT)
Dept: PSYCHIATRY | Facility: CLINIC | Age: 47
End: 2022-03-29

## 2022-03-30 NOTE — TELEPHONE ENCOUNTER
Spoke with Kamlesh Moreno  She reports she is not conscious of this being all the time but her friend told her she "rolls her tongue" and her mouth makes a tic or "tik tok" noise frequently  Only changes were recently Lexapro and Aripiprazole were increased  Janny Nair will have a covering provider review in Dr Shantanu Luis' s absence      Kamlesh Moreno- [phone]

## 2022-03-31 DIAGNOSIS — F31.32 BIPOLAR I DISORDER, MOST RECENT EPISODE DEPRESSED, MODERATE (HCC): Primary | ICD-10-CM

## 2022-03-31 RX ORDER — ARIPIPRAZOLE 15 MG/1
15 TABLET ORAL DAILY
Qty: 30 TABLET | Refills: 0 | Status: SHIPPED | OUTPATIENT
Start: 2022-03-31 | End: 2022-04-21 | Stop reason: SDUPTHER

## 2022-03-31 NOTE — TELEPHONE ENCOUNTER
Denny Leyden called back and stated these symptoms were not present before Aripiprazole increase and only started about 2-3 weeks ago  Please advise

## 2022-03-31 NOTE — TELEPHONE ENCOUNTER
Spoke with Twin Bautista and advised of covering provider's (Dr Jeannie Daley) directions to decrease dose to 15 mg of the Aripiprazole  She verbalized understanding and after 1 week will call office if side effects do not subside  She will need the 15 mg sent to pharmacy  Please review         Nursing- pharmacy alerts patient

## 2022-03-31 NOTE — TELEPHONE ENCOUNTER
Orlando Taylor reviewed her chart  Abilify was last increased to 20mg in January  Did she have these symptoms prior to dose increase? If not, then we can reduce dose or consider agent to address side effects  Please contact patient for clarity  Thanks

## 2022-03-31 NOTE — TELEPHONE ENCOUNTER
In an attempt to limit polypharmacy and not add another medication to address adverse side effects, please encourage Hector Barber to taper Abilify 20mg Daily to 15mg Daily and continue this dose until she speaks or sees Dr Vivien Lundborg next  If her "twitching" persists at Abilify 15mg after 1 week, have her contact the clinic for further guidance  Thanks

## 2022-03-31 NOTE — TELEPHONE ENCOUNTER
LM for Dimple Samy  Prior to increase in Abilify did she experience these symptoms?  Provided nursing number

## 2022-04-06 NOTE — TELEPHONE ENCOUNTER
I cannot change Margi's medications over the phone as she is on a complicated regimen  I suggest referral to PHP if her mood is worsening  If she agrees to [facility], I will send a referral  I do not have any open appointments until 5/4 - I could see her at 4 PM on that day  We can also place her on a cancellation list and she should call frequently to check if any cancellation appointment opens up  Please let her know

## 2022-04-07 ENCOUNTER — TELEPHONE (OUTPATIENT)
Dept: OTHER | Facility: OTHER | Age: 47
End: 2022-04-07

## 2022-04-07 NOTE — TELEPHONE ENCOUNTER
Spoke with Cait Tim  Advised of Dr Stef Rutherford recommendations and need for an appointment for medication changes  She verbalized understanding and stated she "is not opposed to [facility]" and said she did that before and it was wonderful, However, she will need to speak with her boss because the boss will be out for 6 wks and she needs to make sure she "can make it work" She will follow up with nursing when she finds out  Mary Yuan would like to take the appointment 5/4/22 at 4 pm and be placed on the cancellation list as well       Please review

## 2022-04-07 NOTE — TELEPHONE ENCOUNTER
Patient states she continues to have a mouth tic, possibly due to Abilify and it is affecting her mental health  Please call patient

## 2022-04-11 ENCOUNTER — TELEPHONE (OUTPATIENT)
Dept: PSYCHIATRY | Facility: CLINIC | Age: 47
End: 2022-04-11

## 2022-04-11 NOTE — TELEPHONE ENCOUNTER
Bonnie Schmitt called and lm on nursing line  She just wanted Dr Winter Villa to be aware she will attending the [facility] program 4/25/22  She has a spot reserved       FYI

## 2022-04-19 ENCOUNTER — TELEPHONE (OUTPATIENT)
Dept: PSYCHIATRY | Facility: CLINIC | Age: 47
End: 2022-04-19

## 2022-04-21 ENCOUNTER — OFFICE VISIT (OUTPATIENT)
Dept: PSYCHIATRY | Facility: CLINIC | Age: 47
End: 2022-04-21
Payer: MEDICARE

## 2022-04-21 VITALS
HEIGHT: 63 IN | WEIGHT: 203 LBS | HEART RATE: 93 BPM | DIASTOLIC BLOOD PRESSURE: 81 MMHG | SYSTOLIC BLOOD PRESSURE: 128 MMHG | BODY MASS INDEX: 35.97 KG/M2

## 2022-04-21 DIAGNOSIS — Z79.899 LONG-TERM USE OF HIGH-RISK MEDICATION: Chronic | ICD-10-CM

## 2022-04-21 DIAGNOSIS — F90.0 ADHD, PREDOMINANTLY INATTENTIVE TYPE: Chronic | ICD-10-CM

## 2022-04-21 DIAGNOSIS — G47.09 OTHER INSOMNIA: Chronic | ICD-10-CM

## 2022-04-21 DIAGNOSIS — F31.4 BIPOLAR I DISORDER, MOST RECENT EPISODE DEPRESSED, SEVERE WITHOUT PSYCHOTIC FEATURES (HCC): Primary | Chronic | ICD-10-CM

## 2022-04-21 DIAGNOSIS — F41.1 GAD (GENERALIZED ANXIETY DISORDER): Chronic | ICD-10-CM

## 2022-04-21 PROCEDURE — 90833 PSYTX W PT W E/M 30 MIN: CPT | Performed by: PSYCHIATRY & NEUROLOGY

## 2022-04-21 PROCEDURE — 99214 OFFICE O/P EST MOD 30 MIN: CPT | Performed by: PSYCHIATRY & NEUROLOGY

## 2022-04-21 RX ORDER — DULOXETIN HYDROCHLORIDE 30 MG/1
30 CAPSULE, DELAYED RELEASE ORAL DAILY
Qty: 30 CAPSULE | Refills: 0 | Status: SHIPPED | OUTPATIENT
Start: 2022-04-21 | End: 2022-05-19 | Stop reason: SDUPTHER

## 2022-04-21 RX ORDER — ARIPIPRAZOLE 10 MG/1
10 TABLET ORAL
Qty: 30 TABLET | Refills: 0 | Status: SHIPPED | OUTPATIENT
Start: 2022-04-21 | End: 2022-05-19 | Stop reason: ALTCHOICE

## 2022-04-21 RX ORDER — ESCITALOPRAM OXALATE 20 MG/1
10 TABLET ORAL
Start: 2022-04-21 | End: 2022-05-19 | Stop reason: ALTCHOICE
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
## 2022-05-19 ENCOUNTER — OFFICE VISIT (OUTPATIENT)
Dept: PSYCHIATRY | Facility: CLINIC | Age: 47
End: 2022-05-19
Payer: MEDICARE

## 2022-05-19 VITALS
WEIGHT: 204 LBS | DIASTOLIC BLOOD PRESSURE: 78 MMHG | BODY MASS INDEX: 36.14 KG/M2 | SYSTOLIC BLOOD PRESSURE: 127 MMHG | HEIGHT: 63 IN | HEART RATE: 71 BPM

## 2022-05-19 DIAGNOSIS — G47.09 OTHER INSOMNIA: Chronic | ICD-10-CM

## 2022-05-19 DIAGNOSIS — Z79.899 LONG-TERM USE OF HIGH-RISK MEDICATION: Chronic | ICD-10-CM

## 2022-05-19 DIAGNOSIS — F31.4 BIPOLAR I DISORDER, MOST RECENT EPISODE DEPRESSED, SEVERE WITHOUT PSYCHOTIC FEATURES (HCC): Primary | Chronic | ICD-10-CM

## 2022-05-19 DIAGNOSIS — F90.0 ADHD, PREDOMINANTLY INATTENTIVE TYPE: Chronic | ICD-10-CM

## 2022-05-19 DIAGNOSIS — F41.1 GAD (GENERALIZED ANXIETY DISORDER): Chronic | ICD-10-CM

## 2022-05-19 PROCEDURE — 99214 OFFICE O/P EST MOD 30 MIN: CPT | Performed by: PSYCHIATRY & NEUROLOGY

## 2022-05-19 PROCEDURE — 90833 PSYTX W PT W E/M 30 MIN: CPT | Performed by: PSYCHIATRY & NEUROLOGY

## 2022-05-19 RX ORDER — HYDROXYZINE HYDROCHLORIDE 25 MG/1
25 TABLET, FILM COATED ORAL 3 TIMES DAILY
Qty: 90 TABLET | Refills: 4 | Status: SHIPPED | OUTPATIENT
Start: 2022-05-19 | End: 2022-10-16

## 2022-05-19 RX ORDER — TRAZODONE HYDROCHLORIDE 50 MG/1
50-100 TABLET ORAL
Qty: 60 TABLET | Refills: 4 | Status: SHIPPED | OUTPATIENT
Start: 2022-05-19 | End: 2022-10-16

## 2022-05-19 RX ORDER — LAMOTRIGINE 200 MG/1
400 TABLET ORAL
Qty: 60 TABLET | Refills: 4 | Status: SHIPPED | OUTPATIENT
Start: 2022-05-19 | End: 2022-10-16

## 2022-05-19 RX ORDER — DULOXETIN HYDROCHLORIDE 60 MG/1
60 CAPSULE, DELAYED RELEASE ORAL DAILY
Qty: 30 CAPSULE | Refills: 4 | Status: SHIPPED | OUTPATIENT
Start: 2022-05-19 | End: 2022-10-16

## 2022-05-19 RX ORDER — CARBAMAZEPINE 200 MG/1
200 TABLET ORAL 2 TIMES DAILY
Qty: 60 TABLET | Refills: 4 | Status: SHIPPED | OUTPATIENT
Start: 2022-05-19 | End: 2022-10-16

## 2022-05-19 RX ORDER — ATOMOXETINE 60 MG/1
60 CAPSULE ORAL DAILY
Qty: 30 CAPSULE | Refills: 4 | Status: SHIPPED | OUTPATIENT
Start: 2022-05-19 | End: 2022-10-16

## 2022-07-06 ENCOUNTER — TELEPHONE (OUTPATIENT)
Dept: PSYCHIATRY | Facility: CLINIC | Age: 47
End: 2022-07-06
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
## 2022-09-20 ENCOUNTER — TELEMEDICINE (OUTPATIENT)
Dept: PSYCHIATRY | Facility: CLINIC | Age: 47
End: 2022-09-20
Payer: MEDICARE

## 2022-09-20 ENCOUNTER — TELEPHONE (OUTPATIENT)
Dept: PSYCHIATRY | Facility: CLINIC | Age: 47
End: 2022-09-20

## 2022-09-20 VITALS — BODY MASS INDEX: 34.41 KG/M2 | WEIGHT: 187 LBS | HEIGHT: 62 IN

## 2022-09-20 DIAGNOSIS — F90.0 ADHD, PREDOMINANTLY INATTENTIVE TYPE: Chronic | ICD-10-CM

## 2022-09-20 DIAGNOSIS — F31.75 BIPOLAR I DISORDER, MOST RECENT EPISODE DEPRESSED, IN PARTIAL REMISSION (HCC): Primary | Chronic | ICD-10-CM

## 2022-09-20 DIAGNOSIS — F41.1 GAD (GENERALIZED ANXIETY DISORDER): Chronic | ICD-10-CM

## 2022-09-20 DIAGNOSIS — G47.09 OTHER INSOMNIA: Chronic | ICD-10-CM

## 2022-09-20 DIAGNOSIS — Z79.899 LONG-TERM USE OF HIGH-RISK MEDICATION: Chronic | ICD-10-CM

## 2022-09-20 PROCEDURE — 99214 OFFICE O/P EST MOD 30 MIN: CPT | Performed by: PSYCHIATRY & NEUROLOGY

## 2022-09-20 PROCEDURE — 90833 PSYTX W PT W E/M 30 MIN: CPT | Performed by: PSYCHIATRY & NEUROLOGY

## 2022-09-20 RX ORDER — CARBAMAZEPINE 200 MG/1
200 TABLET ORAL 2 TIMES DAILY
Qty: 60 TABLET | Refills: 4 | Status: SHIPPED | OUTPATIENT
Start: 2022-09-20 | End: 2023-02-17

## 2022-09-20 RX ORDER — ATOMOXETINE 80 MG/1
80 CAPSULE ORAL DAILY
Qty: 30 CAPSULE | Refills: 4 | Status: SHIPPED | OUTPATIENT
Start: 2022-09-20 | End: 2023-02-17

## 2022-09-20 RX ORDER — LAMOTRIGINE 200 MG/1
400 TABLET ORAL
Qty: 60 TABLET | Refills: 4 | Status: SHIPPED | OUTPATIENT
Start: 2022-09-20 | End: 2023-02-17

## 2022-09-20 RX ORDER — TRAZODONE HYDROCHLORIDE 50 MG/1
50-100 TABLET ORAL
Qty: 60 TABLET | Refills: 4 | Status: SHIPPED | OUTPATIENT
Start: 2022-09-20 | End: 2023-02-17

## 2022-09-20 RX ORDER — HYDROXYZINE HYDROCHLORIDE 25 MG/1
25 TABLET, FILM COATED ORAL 3 TIMES DAILY
Qty: 90 TABLET | Refills: 4 | Status: SHIPPED | OUTPATIENT
Start: 2022-09-20 | End: 2023-02-17

## 2022-09-20 RX ORDER — DULOXETIN HYDROCHLORIDE 60 MG/1
60 CAPSULE, DELAYED RELEASE ORAL DAILY
Qty: 30 CAPSULE | Refills: 4 | Status: SHIPPED | OUTPATIENT
Start: 2022-09-20 | End: 2023-02-17

## 2022-09-20 NOTE — BH TREATMENT PLAN
TREATMENT PLAN (Medication Management Only)        [facility]    Name/Date of Birth/MRN:  Franca Arechiga 42 y o  1975 MRN: [id number]  Date of Treatment Plan: September 20, 2022  Diagnosis/Diagnoses:   1  Bipolar I disorder, most recent episode depressed, in partial remission ([facility] [address] )    2  SANA (generalized anxiety disorder)    3  ADHD, predominantly inattentive type    4  Other insomnia    5  Long-term use of high-risk medication      Strengths/Personal Resources for Self-Care: "resilience, communications"  Area/Areas of need (in own words): "personalization, I take everything personally, rumination and catastrophizing"  1  Long Term Goal:   maintain control of anxiety, maintain improvement in depression, maintain mood stability  Target Date: 2 months - 11/20/2022  Person/Persons responsible for completion of goal: Margi Villanueva  Short Term Objective (s) - How will we reach this goal?:   A  Provider new recommended medication/dosage changes and/or continue medication(s): increase Strattera, continue all other medications (Cymbalta, Trazodone, Tegretol, Lamictal and Atarax)  B   N/A   C   N/A  Target Date: 2 months - 11/20/2022  Person/Persons Responsible for Completion of Goal: Clair Cheney   Progress Towards Goals: progressing  Treatment Modality: medication management every 2 months, continue psychotherapy with own therapist  Review due 180 days from date of this plan: 6 months - 3/20/2023  Expected length of service: ongoing treatment unless revised  My Physician/PA/NP and I have developed this plan together and I agree to work on the goals and objectives  I understand the treatment goals that were developed for my treatment    Electronic Signatures: on file (unless signed below)    Phuong Story MD 09/20/22

## 2022-09-27 ENCOUNTER — OFFICE VISIT (OUTPATIENT)
Dept: URGENT CARE | Age: 47
End: 2022-09-27
Payer: COMMERCIAL

## 2022-09-27 ENCOUNTER — APPOINTMENT (OUTPATIENT)
Dept: RADIOLOGY | Age: 47
End: 2022-09-27
Payer: COMMERCIAL

## 2022-09-27 VITALS
DIASTOLIC BLOOD PRESSURE: 68 MMHG | OXYGEN SATURATION: 98 % | HEART RATE: 79 BPM | TEMPERATURE: 97.7 F | RESPIRATION RATE: 18 BRPM | SYSTOLIC BLOOD PRESSURE: 143 MMHG

## 2022-09-27 DIAGNOSIS — S69.92XA INJURY OF FINGER OF LEFT HAND, INITIAL ENCOUNTER: Primary | ICD-10-CM

## 2022-09-27 DIAGNOSIS — S69.92XA INJURY OF FINGER OF LEFT HAND, INITIAL ENCOUNTER: ICD-10-CM

## 2022-09-27 PROCEDURE — G0463 HOSPITAL OUTPT CLINIC VISIT: HCPCS

## 2022-09-27 PROCEDURE — 73130 X-RAY EXAM OF HAND: CPT

## 2022-09-27 PROCEDURE — 99213 OFFICE O/P EST LOW 20 MIN: CPT

## 2022-09-27 NOTE — PROGRESS NOTES
[address]        NAME: Pancho Moss is a 42 y o  female  : 1975    MRN: [id number]  DATE: 2022  TIME: 4:02 PM    Assessment and Plan   Injury of finger of left hand, initial encounter [S69 [id number]]  1  Injury of finger of left hand, initial encounter  XR hand 3+ vw left    Ambulatory Referral to Hand Surgery     Patient presents with left thumb pain after opening bottle this evening  TTP at distal thumb joint  Swelling noted to bottom  No bruising at current time  Has full ROM  Assessment notes TTP to distal thumb joint  Xray notes nondisplaced fracture  Will splint and refer to ortho  Patient Instructions       Follow up with ortho    Chief Complaint     Chief Complaint   Patient presents with    Thumb Pain     Pt c/o left thumb injury  Pt was trying to remove the top of a water bottle and heard a "crack and pop" Hurts more with applying pressure  Sharp pain  History of Present Illness       Patient presents with left thumb pain after opening bottle this evening  TTP at distal thumb joint  Swelling noted to bottom  No bruising at current time  Has full ROM  Assessment notes TTP to distal thumb joint  Xray notes nondisplaced fracture  Will splint and refer to ortho  Review of Systems   Review of Systems   Constitutional: Negative for chills, fatigue and fever  HENT: Negative for postnasal drip and sore throat  Respiratory: Negative for cough and shortness of breath  Cardiovascular: Negative for chest pain and palpitations  Gastrointestinal: Negative for abdominal pain, nausea and vomiting  Genitourinary: Negative for dysuria  Musculoskeletal: Positive for arthralgias and joint swelling  Negative for gait problem  Skin: Negative for rash  Neurological: Negative for dizziness, syncope, light-headedness, numbness and headaches  Psychiatric/Behavioral: Negative for agitation and confusion     All other systems reviewed and are negative          Current Medications       Current Outpatient Medications:     atoMOXetine (STRATTERA) 80 MG capsule, Take 1 capsule (80 mg total) by mouth daily, Disp: 30 capsule, Rfl: 4    carBAMazepine (TEGretol) 200 mg tablet, Take 1 tablet (200 mg total) by mouth 2 (two) times a day, Disp: 60 tablet, Rfl: 4    DULoxetine (Cymbalta) 60 mg delayed release capsule, Take 1 capsule (60 mg total) by mouth daily, Disp: 30 capsule, Rfl: 4    hydrOXYzine HCL (ATARAX) 25 mg tablet, Take 1 tablet (25 mg total) by mouth 3 (three) times a day, Disp: 90 tablet, Rfl: 4    lamoTRIgine (LaMICtal) 200 MG tablet, Take 2 tablets (400 mg total) by mouth daily at bedtime, Disp: 60 tablet, Rfl: 4    Multiple Vitamins-Minerals (HAIR SKIN NAILS PO), Take by mouth daily  , Disp: , Rfl:     pantoprazole (PROTONIX) 40 mg tablet, Take 40 mg by mouth daily , Disp: , Rfl:     traZODone (DESYREL) 50 mg tablet, Take 1-2 tablets ( mg total) by mouth daily at bedtime as needed for sleep, Disp: 60 tablet, Rfl: 4    Current Allergies     Allergies as of 09/27/2022 - Reviewed 09/27/2022   Allergen Reaction Noted    Geodon [ziprasidone]  07/02/2018    Quetiapine  06/21/2018    Other  02/18/2016            The following portions of the patient's history were reviewed and updated as appropriate: allergies, current medications, past family history, past medical history, past social history, past surgical history and problem list      Past Medical History:   Diagnosis Date    Acute pancreatitis     Last Assessed: 12Apr2016; due to gallstones (removed)    Anemia     Anxiety     Arthralgia     Concussion     Last Assessed: [id number]    Endometriosis     Esophageal reflux     Familial combined hyperlipidemia     Last Assessed: 13Sep2013    Gastropathy     Head injury     History of sinusitis     Irregular heart beat     Migraine     Last Assessed: [id number]    Paroxysmal supraventricular tachycardia (HCC)     Peptic ulcer disease     Spontaneous      [facility]stance [facility]     [facility]     UTI (urinary tract infection)     Vitamin D deficiency        Past Surgical History:   Procedure Laterality Date    BARIATRIC SURGERY  2020    sleeve     SECTION      FOREARM SURGERY Left     INDUCED       x3    LAPAROSCOPIC CHOLECYSTECTOMY      Last Assessed: 2016    LAPAROSCOPY      Exploratory    SLEEVE GASTROPLASTY      TONSILLECTOMY      Onset: [id number]       Family History   Problem Relation Age of Onset    Hyperthyroidism Mother     Thyroid disease Mother     Depression Father     Hypertension Father     Obesity Father     Other Paternal Grandmother         Cardiac Disorder    Dementia Paternal Grandmother     Hypertension Paternal Grandmother     Diabetes Paternal Grandmother     Hypertension Paternal Grandfather     Other Paternal Grandfather         Cardiac Disorder    Diabetes Paternal Grandfather     Schizophrenia Other     Schizophrenia Other     Suicidality Other     Completed Suicide  Other     Breast cancer Other     Hypertension Paternal Aunt     No Known Problems Maternal Grandmother     No Known Problems Maternal Grandfather     No Known Problems Son     No Known Problems Son     No Known Problems Brother     No Known Problems Maternal Aunt     Stroke Neg Hx     Drug abuse Neg Hx          Medications have been verified  Objective   /68   Pulse 79   Temp 97 7 °F (36 5 °C) (Tympanic)   Resp 18   SpO2 98%   No LMP recorded  Patient has had an implant  Physical Exam     Physical Exam  Vitals reviewed  Constitutional:       General: She is not in acute distress  Appearance: Normal appearance  HENT:      Head: Normocephalic and atraumatic        Right Ear: Tympanic membrane and ear canal normal       Left Ear: Tympanic membrane and ear canal normal       Mouth/Throat:      Mouth: Mucous membranes are moist       Pharynx: No oropharyngeal exudate or posterior oropharyngeal erythema  Eyes:      Extraocular Movements: Extraocular movements intact  Conjunctiva/sclera: Conjunctivae normal       Pupils: Pupils are equal, round, and reactive to light  Cardiovascular:      Rate and Rhythm: Normal rate and regular rhythm  Pulses: Normal pulses  Heart sounds: Normal heart sounds  No murmur heard  Pulmonary:      Effort: Pulmonary effort is normal  No respiratory distress  Breath sounds: Normal breath sounds  Abdominal:      General: Abdomen is flat  Bowel sounds are normal  There is no distension  Palpations: Abdomen is soft  Tenderness: There is no abdominal tenderness  There is no guarding or rebound  Musculoskeletal:         General: Swelling and tenderness present  Normal range of motion  Cervical back: Normal range of motion and neck supple  No tenderness  Skin:     General: Skin is warm  Findings: Erythema present  Neurological:      General: No focal deficit present  Mental Status: She is alert     Psychiatric:         Mood and Affect: Mood normal          Behavior: Behavior normal          Judgment: Judgment normal

## 2022-09-28 ENCOUNTER — OFFICE VISIT (OUTPATIENT)
Dept: OBGYN CLINIC | Facility: CLINIC | Age: 47
End: 2022-09-28
Payer: MEDICARE

## 2022-09-28 ENCOUNTER — TELEPHONE (OUTPATIENT)
Dept: OBGYN CLINIC | Facility: HOSPITAL | Age: 47
End: 2022-09-28

## 2022-09-28 VITALS
SYSTOLIC BLOOD PRESSURE: 120 MMHG | BODY MASS INDEX: 34.41 KG/M2 | WEIGHT: 187 LBS | HEART RATE: 83 BPM | HEIGHT: 62 IN | DIASTOLIC BLOOD PRESSURE: 77 MMHG

## 2022-09-28 DIAGNOSIS — S62.525A CLOSED NONDISPLACED FRACTURE OF DISTAL PHALANX OF LEFT THUMB, INITIAL ENCOUNTER: Primary | ICD-10-CM

## 2022-09-28 PROCEDURE — 99203 OFFICE O/P NEW LOW 30 MIN: CPT | Performed by: SURGERY

## 2022-09-28 NOTE — LETTER
September 28, 2022     Patient: Jose Nielsen  YOB: 1975  Date of Visit: 9/28/2022      To Whom it May Concern:    Carloz Oliva is under my professional care  Mario Lund was seen in my office on 9/28/2022  Mario Lund may return to work light duty tomorrow until next office visit    If you have any questions or concerns, please don't hesitate to call           Sincerely,          Parish Willson MD

## 2022-09-28 NOTE — PROGRESS NOTES
ORTHOPAEDIC HAND, WRIST, AND ELBOW OFFICE  VISIT       ASSESSMENT/PLAN:      42 y  o  Female presents today with woodrow fracture of distal phalanx of Left thumb       Diagnostics reviewed and physical exam performed  Diagnosis, treatment options and associated risks were discussed with the patient including no treatment, nonsurgical treatment and potential for surgical intervention  The patient was given the opportunity to ask questions regarding each  The patient verbalized understanding of exam findings and treatment plan  We engaged in the shared decision-making process and treatment options were discussed at length with the patient  Surgical and conservative management discussed today along with risks and benefits  Plain films reviewed independently:  Cortical irregularity at proximal portion of distal phalanx of thumb  She is tender at this location as well  Will treat as a fracture  Pt dispensed and fitted with [employer] finger splint size 5  Pt advised she may remove to shower  She is not to lift anything heavy until next office visit      Diagnoses and all orders for this visit:    Closed nondisplaced fracture of distal phalanx of left thumb, initial encounter        Follow Up:  Return in about 3 weeks (around 10/19/2022)  To Do Next Visit:  Re-evaluation of current issue      General Discussions:  Fracture - Nonoperative Care: The physiology of a fractured bone was discussed with the patient today  With non-displaced or minimally displaced fractures, conservative treatment such as casting or splinting often results in a functional recovery  Typically, these fractures are immobilized in either a cast or splint depending on the pattern  Radiographs are typically taken at intervals throughout the fracture healing to ensure that reduction or alignment is not lost   If the fracture loses its alignment, surgical intervention may be required to stabilize it    Medical conditions such as diabetes, osteoporosis, vitamin D deficiency, and a history of or exposure to smoking may delay or prevent fracture healing  Options between cast/splint immobilization and surgical treatment were offered and the risks and benefits of both were discussed  ____________________________________________________________________________________________________________________________________________      CHIEF COMPLAINT:  Chief Complaint   Patient presents with    Left Hand - Pain       SUBJECTIVE:  Neha Krause is a 42y o  year old  female who presents today for evaluation of Left thumb pain DOI 2022     Pt states she was attempting to open a reusable water bottle and the lid was tightly capped  Pt states she attempted to open the lid by sticking her thumb in the lid where the liquid comes out  She states she tried to turn the lid she felt a crack and pop  She states she had immediate pain in her thumb  Pt states she went to Urgent care and X-rayed and told she had a Left thumb fracture   She was given a aluminum thumb splint    I have personally reviewed all the relevant PMH, PSH, SH, FH, Medications and allergies      PAST MEDICAL HISTORY:  Past Medical History:   Diagnosis Date    Acute pancreatitis     Last Assessed: 2016; due to gallstones (removed)    Anemia     Anxiety     Arthralgia     Concussion     Last Assessed: [id number]    Endometriosis     Esophageal reflux     Familial combined hyperlipidemia     Last Assessed: [id number]    Gastropathy     Head injury     History of sinusitis     Irregular heart beat     Migraine     Last Assessed: 2012    Paroxysmal [facility]ricular tachy[facility]     Peptic ulcer disease     Spontaneous      Substance abuse ([facility] [facility]     Suicide attempt ([facility] )     UTI (urinary tract infection)     Vitamin D deficiency        PAST SURGICAL HISTORY:  Past Surgical History:   Procedure Laterality Date    BARIATRIC SURGERY  2020    sleeve   SECTION      FOREARM SURGERY Left     INDUCED       x3    LAPAROSCOPIC CHOLECYSTECTOMY      Last Assessed: 2016    LAPAROSCOPY      Exploratory    SLEEVE GASTROPLASTY      TONSILLECTOMY      Onset: [id number]       FAMILY HISTORY:  Family History   Problem Relation Age of Onset    Hyperthyroidism Mother     Thyroid disease Mother     Depression Father     Hypertension Father     Obesity Father     Other Paternal Grandmother         Cardiac Disorder    Dementia Paternal Grandmother     Hypertension Paternal Grandmother     Diabetes Paternal Grandmother     Hypertension Paternal Grandfather     Other Paternal Grandfather         Cardiac Disorder    Diabetes Paternal Grandfather     Schizophrenia Other     Schizophrenia Other     Suicidality Other     Completed Suicide  Other     Breast cancer Other     Hypertension Paternal Aunt     No Known Problems Maternal Grandmother     No Known Problems Maternal Grandfather     No Known Problems Son     No Known Problems Son     No Known Problems Brother     No Known Problems Maternal Aunt     Stroke Neg Hx     Drug abuse Neg Hx        SOCIAL HISTORY:  Social History     Tobacco Use    Smoking status: Former Smoker     Packs/day: 0 25     Years: 3 00     Pack years: 0 75     Types: Cigarettes     Quit date: 10/22/2021     Years since quittin 9    Smokeless tobacco: Never Used    Tobacco comment: Quit   Vaping Use    Vaping Use: Never used   Substance Use Topics    Alcohol use: Yes     Comment: social    Drug use: No     Comment: Past cocaine and marijuana use for 2 years until   Also tried LSD years ago   No current recreational drug use       MEDICATIONS:    Current Outpatient Medications:     atoMOXetine (STRATTERA) 80 MG capsule, Take 1 capsule (80 mg total) by mouth daily, Disp: 30 capsule, Rfl: 4    carBAMazepine (TEGretol) 200 mg tablet, Take 1 tablet (200 mg total) by mouth 2 (two) times a day, Disp: 60 tablet, Rfl: 4    DULoxetine (Cymbalta) 60 mg delayed release capsule, Take 1 capsule (60 mg total) by mouth daily, Disp: 30 capsule, Rfl: 4    hydrOXYzine HCL (ATARAX) 25 mg tablet, Take 1 tablet (25 mg total) by mouth 3 (three) times a day, Disp: 90 tablet, Rfl: 4    lamoTRIgine (LaMICtal) 200 MG tablet, Take 2 tablets (400 mg total) by mouth daily at bedtime, Disp: 60 tablet, Rfl: 4    Multiple Vitamins-Minerals (HAIR SKIN NAILS PO), Take by mouth daily  , Disp: , Rfl:     pantoprazole (PROTONIX) 40 mg tablet, Take 40 mg by mouth daily , Disp: , Rfl:     traZODone (DESYREL) 50 mg tablet, Take 1-2 tablets ( mg total) by mouth daily at bedtime as needed for sleep, Disp: 60 tablet, Rfl: 4    ALLERGIES:  Allergies   Allergen Reactions    Geodon [Ziprasidone]      Nystagmus    Quetiapine      Other reaction(s): Other (See Comments)  Per pt low blood pressure and fainting    Other      Seasonal allergies           REVIEW OF SYSTEMS:  Review of Systems   Constitutional: Negative for chills and fever  HENT: Negative for ear pain and sore throat  Eyes: Negative for pain and visual disturbance  Respiratory: Negative for cough and shortness of breath  Cardiovascular: Negative for chest pain and palpitations  Gastrointestinal: Negative for abdominal pain and vomiting  Genitourinary: Negative for dysuria and hematuria  Musculoskeletal: Positive for arthralgias  Negative for back pain  Skin: Negative for color change and rash  Neurological: Negative for seizures and syncope  All other systems reviewed and are negative        VITALS:  Vitals:    09/28/22 1326   BP: 120/77   Pulse: 83       LABS:  HgA1c:   Lab Results   Component Value Date    HGBA1C 5 3 06/04/2021     BMP:   Lab Results   Component Value Date    GLUCOSE 95 10/15/2015    CALCIUM 8 9 08/26/2020     10/15/2015    K 3 7 08/26/2020    CO2 24 08/26/2020     08/26/2020    BUN 12 08/26/2020    CREATININE 0 77 08/26/2020       _____________________________________________________  PHYSICAL EXAMINATION:  General: well developed and well nourished, alert, oriented times 3 and appears comfortable  Psychiatric: Normal  HEENT: Normocephalic, Atraumatic Trachea Midline, No torticollis  Pulmonary: No audible wheezing or respiratory distress   Abdomen/GI: Non tender, non distended   Cardiovascular: No pitting edema, 2+ radial pulse   Skin: No masses, erythema, lacerations, fluctation, ulcerations  Neurovascular: Sensation Intact to the Median, Ulnar, Radial Nerve, Motor Intact to the Median, Ulnar, Radial Nerve and Pulses Intact  Musculoskeletal: Normal, except as noted in detailed exam and in HPI        MUSCULOSKELETAL EXAMINATION:    TTP at Left thumb proximal distal phalanx level  ___________________________________________________  STUDIES REVIEWED:  I have personally reviewed AP lateral and oblique radiographs of Left hand  which demonstrate     Guerrero distal phalanx fracture of Left thumb           PROCEDURES PERFORMED:  Procedures  No Procedures performed today    _____________________________________________________      Mary Mike    I,:  Rey Britt am acting as a scribe while in the presence of the attending physician :       I,:  Bishop Cherry MD personally performed the services described in this documentation    as scribed in my presence :

## 2022-09-28 NOTE — TELEPHONE ENCOUNTER
Hello,    Please see force on request below:    Name: Fransico Ray    : [birth year]    MRN: [id number]    #:     Insurance: Medicare    Reason for Appt:  Injury of finger of left hand   XR in epic  Requesting Doctor/Location: art        Thank You,

## 2022-10-05 ENCOUNTER — TELEPHONE (OUTPATIENT)
Dept: PSYCHIATRY | Facility: CLINIC | Age: 47
End: 2022-10-05

## 2022-10-06 ENCOUNTER — TELEPHONE (OUTPATIENT)
Dept: PSYCHIATRY | Facility: CLINIC | Age: 47
End: 2022-10-06

## 2022-10-06 ENCOUNTER — TELEMEDICINE (OUTPATIENT)
Dept: PSYCHIATRY | Facility: CLINIC | Age: 47
End: 2022-10-06

## 2022-10-06 DIAGNOSIS — F31.75 BIPOLAR I DISORDER, MOST RECENT EPISODE DEPRESSED, IN PARTIAL REMISSION (HCC): Primary | Chronic | ICD-10-CM

## 2022-10-06 PROCEDURE — NOSHOW: Performed by: SOCIAL WORKER

## 2022-10-06 NOTE — PSYCH
Virtual Regular Visit    Verification of patient location:    Patient is located in the following state in which I hold an active license { amb virtual patient location:[address]}      Assessment/Plan:    Problem List Items Addressed This Visit    None         Goals addressed in session: {GOALS:73710}          Reason for visit is   Chief Complaint   Patient presents with    Virtual Regular Visit        Encounter provider Zahira Lane LCSW    Provider located at [address] [place] [address] Demetrice Pham [phone] [phone]      Recent Visits  No visits were found meeting these conditions  Showing recent visits within past 7 days and meeting all other requirements  Future Appointments  No visits were found meeting these conditions  Showing future appointments within next 150 days and meeting all other requirements       The patient was identified by name and date of birth  Linda Zacarias was informed that this is a telemedicine visit and that the visit is being conducted through{AMB VIRTUAL VISIT TPOYMY:56865}  {Telemedicine confidentiality :81122} {Telemedicine participants:58591}  She acknowledged consent and understanding of privacy and security of the video platform  The patient has agreed to participate and understands they can discontinue the visit at any time  Patient is aware this is a billable service  Subjective  Linda Zacarias is a 42 y o  female ***         HPI     Past Medical History:   Diagnosis Date    Acute pancreatitis     Last Assessed: 12Apr2016; due to gallstones (removed)    Anemia     Anxiety     Arthralgia     Concussion     Last Assessed: [id number]    Endometriosis     Esophageal reflux     Familial combined hyperlipidemia     Last Assessed: 13Sep2013    Gastropathy     Head injury     History of sinusitis     Irregular heart beat     Migraine     Last Assessed: 11Oct2012    [facility] ([facility]     Peptic ulcer disease     Spontaneous      Substance abuse ([facility]     Suicide attempt ([facility] )     UTI (urinary tract infection)     Vitamin D deficiency        Past Surgical History:   Procedure Laterality Date    BARIATRIC SURGERY  2020    sleeve     SECTION      FOREARM SURGERY Left     INDUCED       x3    LAPAROSCOPIC CHOLECYSTECTOMY      Last Assessed: 2016    LAPAROSCOPY      Exploratory    SLEEVE GASTROPLASTY      TONSILLECTOMY      Onset: [id number]       Current Outpatient Medications   Medication Sig Dispense Refill    atoMOXetine (STRATTERA) 80 MG capsule Take 1 capsule (80 mg total) by mouth daily 30 capsule 4    carBAMazepine (TEGretol) 200 mg tablet Take 1 tablet (200 mg total) by mouth 2 (two) times a day 60 tablet 4    DULoxetine (Cymbalta) 60 mg delayed release capsule Take 1 capsule (60 mg total) by mouth daily 30 capsule 4    hydrOXYzine HCL (ATARAX) 25 mg tablet Take 1 tablet (25 mg total) by mouth 3 (three) times a day 90 tablet 4    lamoTRIgine (LaMICtal) 200 MG tablet Take 2 tablets (400 mg total) by mouth daily at bedtime 60 tablet 4    Multiple Vitamins-Minerals (HAIR SKIN NAILS PO) Take by mouth daily        pantoprazole (PROTONIX) 40 mg tablet Take 40 mg by mouth daily       traZODone (DESYREL) 50 mg tablet Take 1-2 tablets ( mg total) by mouth daily at bedtime as needed for sleep 60 tablet 4     No current facility-administered medications for this visit  Allergies   Allergen Reactions    Geodon [Ziprasidone]      Nystagmus    Quetiapine      Other reaction(s): Other (See Comments)  Per pt low blood pressure and fainting    Other      Seasonal allergies       Review of Systems    Video Exam    There were no vitals filed for this visit      Physical Exam     {Time Spent:20011}

## 2022-10-06 NOTE — PSYCH
No Call  No Show  No Charge    Mayda Lees no showed 10/06/22 appointment , staff called and left message to reschedule appointment     Treatment Plan not due at this session

## 2022-10-07 ENCOUNTER — TELEPHONE (OUTPATIENT)
Dept: BEHAVIORAL/MENTAL HEALTH CLINIC | Facility: CLINIC | Age: 47
End: 2022-10-07
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
## 2022-10-20 ENCOUNTER — OFFICE VISIT (OUTPATIENT)
Dept: OBGYN CLINIC | Facility: CLINIC | Age: 47
End: 2022-10-20
Payer: COMMERCIAL

## 2022-10-20 VITALS
BODY MASS INDEX: 34.41 KG/M2 | DIASTOLIC BLOOD PRESSURE: 76 MMHG | WEIGHT: 187 LBS | HEART RATE: 82 BPM | SYSTOLIC BLOOD PRESSURE: 114 MMHG | HEIGHT: 62 IN

## 2022-10-20 DIAGNOSIS — S62.525D CLOSED NONDISPLACED FRACTURE OF DISTAL PHALANX OF LEFT THUMB WITH ROUTINE HEALING, SUBSEQUENT ENCOUNTER: Primary | ICD-10-CM

## 2022-10-20 PROCEDURE — 99213 OFFICE O/P EST LOW 20 MIN: CPT | Performed by: SURGERY

## 2022-11-10 ENCOUNTER — TELEPHONE (OUTPATIENT)
Dept: PSYCHIATRY | Facility: CLINIC | Age: 47
End: 2022-11-10

## 2023-01-25 DIAGNOSIS — G47.09 OTHER INSOMNIA: Chronic | ICD-10-CM

## 2023-01-25 DIAGNOSIS — F31.75 BIPOLAR I DISORDER, MOST RECENT EPISODE DEPRESSED, IN PARTIAL REMISSION (HCC): Primary | Chronic | ICD-10-CM

## 2023-01-25 RX ORDER — LAMOTRIGINE 200 MG/1
400 TABLET ORAL
Qty: 60 TABLET | Refills: 0 | Status: SHIPPED | OUTPATIENT
Start: 2023-01-25 | End: 2023-02-24

## 2023-01-25 RX ORDER — TRAZODONE HYDROCHLORIDE 50 MG/1
50-100 TABLET ORAL
Qty: 60 TABLET | Refills: 0 | Status: SHIPPED | OUTPATIENT
Start: 2023-01-25 | End: 2023-02-24

## 2023-02-15 DIAGNOSIS — F90.0 ADHD, PREDOMINANTLY INATTENTIVE TYPE: Primary | Chronic | ICD-10-CM

## 2023-02-15 RX ORDER — ATOMOXETINE 80 MG/1
80 CAPSULE ORAL DAILY
Qty: 30 CAPSULE | Refills: 0 | Status: SHIPPED | OUTPATIENT
Start: 2023-02-15 | End: 2023-03-17

## 2023-03-10 ENCOUNTER — HOSPITAL ENCOUNTER (EMERGENCY)
Facility: HOSPITAL | Age: 48
Discharge: HOME | End: 2023-03-10
Attending: EMERGENCY MEDICINE

## 2023-03-10 ENCOUNTER — NURSE TRIAGE (OUTPATIENT)
Dept: OTHER | Facility: OTHER | Age: 48
End: 2023-03-10

## 2023-03-10 VITALS
HEART RATE: 87 BPM | TEMPERATURE: 98.2 F | SYSTOLIC BLOOD PRESSURE: 140 MMHG | OXYGEN SATURATION: 99 % | DIASTOLIC BLOOD PRESSURE: 72 MMHG | RESPIRATION RATE: 18 BRPM

## 2023-03-10 DIAGNOSIS — R00.2 PALPITATIONS: Primary | ICD-10-CM

## 2023-03-10 LAB
ALBUMIN SERPL BCP-MCNC: 3.7 G/DL (ref 3.5–5)
ALP SERPL-CCNC: 83 U/L (ref 46–116)
ALT SERPL W P-5'-P-CCNC: 25 U/L (ref 12–78)
ANION GAP SERPL CALCULATED.3IONS-SCNC: 7 MMOL/L (ref 4–13)
AST SERPL W P-5'-P-CCNC: 9 U/L (ref 5–45)
ATRIAL RATE: 66 BPM
BASOPHILS # BLD AUTO: 0.08 THOUSANDS/ÂΜL (ref 0–0.1)
BASOPHILS NFR BLD AUTO: 1 % (ref 0–1)
BILIRUB SERPL-MCNC: 0.24 MG/DL (ref 0.2–1)
BUN SERPL-MCNC: 15 MG/DL (ref 5–25)
CALCIUM SERPL-MCNC: 9.2 MG/DL (ref 8.3–10.1)
CARDIAC TROPONIN I PNL SERPL HS: <2 NG/L
CHLORIDE SERPL-SCNC: 106 MMOL/L (ref 96–108)
CO2 SERPL-SCNC: 24 MMOL/L (ref 21–32)
CREAT SERPL-MCNC: 0.94 MG/DL (ref 0.6–1.3)
EOSINOPHIL # BLD AUTO: 0.17 THOUSAND/ÂΜL (ref 0–0.61)
EOSINOPHIL NFR BLD AUTO: 2 % (ref 0–6)
ERYTHROCYTE [DISTWIDTH] IN BLOOD BY AUTOMATED COUNT: 14.8 % (ref 11.6–15.1)
GFR SERPL CREATININE-BSD FRML MDRD: 73 ML/MIN/1.73SQ M
GLUCOSE SERPL-MCNC: 97 MG/DL (ref 65–140)
HCT VFR BLD AUTO: 33.2 % (ref 34.8–46.1)
HGB BLD-MCNC: 10.4 G/DL (ref 11.5–15.4)
IMM GRANULOCYTES # BLD AUTO: 0.04 THOUSAND/UL (ref 0–0.2)
IMM GRANULOCYTES NFR BLD AUTO: 1 % (ref 0–2)
LYMPHOCYTES # BLD AUTO: 2.21 THOUSANDS/ÂΜL (ref 0.6–4.47)
LYMPHOCYTES NFR BLD AUTO: 32 % (ref 14–44)
MCH RBC QN AUTO: 24.5 PG (ref 26.8–34.3)
MCHC RBC AUTO-ENTMCNC: 31.3 G/DL (ref 31.4–37.4)
MCV RBC AUTO: 78 FL (ref 82–98)
MONOCYTES # BLD AUTO: 0.49 THOUSAND/ÂΜL (ref 0.17–1.22)
MONOCYTES NFR BLD AUTO: 7 % (ref 4–12)
NEUTROPHILS # BLD AUTO: 4.01 THOUSANDS/ÂΜL (ref 1.85–7.62)
NEUTS SEG NFR BLD AUTO: 57 % (ref 43–75)
NRBC BLD AUTO-RTO: 0 /100 WBCS
P AXIS: 40 DEGREES
PLATELET # BLD AUTO: 438 THOUSANDS/UL (ref 149–390)
PMV BLD AUTO: 9.5 FL (ref 8.9–12.7)
POTASSIUM SERPL-SCNC: 3.9 MMOL/L (ref 3.5–5.3)
PR INTERVAL: 168 MS
PROT SERPL-MCNC: 7.7 G/DL (ref 6.4–8.4)
QRS AXIS: 31 DEGREES
QRSD INTERVAL: 74 MS
QT INTERVAL: 390 MS
QTC INTERVAL: 408 MS
RBC # BLD AUTO: 4.24 MILLION/UL (ref 3.81–5.12)
SODIUM SERPL-SCNC: 137 MMOL/L (ref 135–147)
T WAVE AXIS: 20 DEGREES
VENTRICULAR RATE: 66 BPM
WBC # BLD AUTO: 7 THOUSAND/UL (ref 4.31–10.16)

## 2023-03-10 RX ADMIN — SODIUM CHLORIDE 500 ML: 0.9 INJECTION, SOLUTION INTRAVENOUS at 06:31

## 2023-03-10 NOTE — Clinical Note
Laura Mccall was seen and treated in our emergency department on 3/10/2023  Diagnosis:     Paul Deleon  is off the rest of the shift today  She may return on this date: If you have any questions or concerns, please don't hesitate to call        Louise Machado MD    ______________________________           _______________          _______________  Hospital Representative                              Date                                Time

## 2023-03-10 NOTE — ED PROVIDER NOTES
History  Chief Complaint   Patient presents with   • Palpitations     Pt states past hx of SVT  Tonight states she feels her heart "flutter" on and off, causing chest discomfort and sob  Pt states she is usually able to get out of SVT on her own but can't get palpations to stop  44-year-old female with history of anemia, SVT that for started when pregnant 13 years ago presents emergency department due to recurrent episodes of palpitations this morning  Patient notes that she wakes up around 3:30 AM every morning and 20 minutes after waking she started to have palpitations  She put a pulse ox on her finger and noted that her heart rate was around 118  She has frequent episodes like this but they tend to resolve within 30 seconds to a minute  Today she had 2 episodes that each lasted around 45 minutes so she came to emergency department for evaluation  During her episode she also had some chest tightness/3 out of 10 pain in the midsternal area  She also had mild dyspnea w which improved as soon as her palpitations stopped  Her chest pain at this point time is also resolved  She does not drink alcohol/use illicit drugs x 21 years  She does drink 2 16 ounce cups of coffee daily  She was previously on rate control medications but has not been for "many years"  She denies any unilateral leg swelling or calf pain  No history of DVTs or PEs  No other complaints at this time  Prior to Admission Medications   Prescriptions Last Dose Informant Patient Reported? Taking?    DULoxetine (Cymbalta) 60 mg delayed release capsule   No No   Sig: Take 1 capsule (60 mg total) by mouth daily   Multiple Vitamins-Minerals (HAIR SKIN NAILS PO)   Yes No   Sig: Take by mouth daily     atomoxetine (STRATTERA) 80 MG capsule   No No   Sig: Take 1 capsule (80 mg total) by mouth daily   carBAMazepine (TEGretol) 200 mg tablet   No No   Sig: Take 1 tablet (200 mg total) by mouth 2 (two) times a day   hydrOXYzine HCL (ATARAX) 25 mg tablet   No No   Sig: Take 1 tablet (25 mg total) by mouth 3 (three) times a day   lamoTRIgine (LaMICtal) 200 MG tablet   No No   Sig: Take 2 tablets (400 mg total) by mouth daily at bedtime   pantoprazole (PROTONIX) 40 mg tablet   Yes No   Sig: Take 40 mg by mouth daily    traZODone (DESYREL) 50 mg tablet   No No   Sig: Take 1-2 tablets ( mg total) by mouth daily at bedtime as needed for sleep      Facility-Administered Medications: None       Past Medical History:   Diagnosis Date   • Acute pancreatitis     Last Assessed: 2016; due to gallstones (removed)   • Anemia    • Anxiety    • Arthralgia    • Concussion     Last Assessed:    • Endometriosis    • Esophageal reflux    • Familial combined hyperlipidemia     Last Assessed: 2013   • Gastropathy    • Head injury    • History of sinusitis    • Irregular heart beat    • Migraine     Last Assessed: 2012   • Paroxysmal supraventricular tachycardia (HCC)    • Peptic ulcer disease    • Spontaneous     • [facility] )    • Suicide attempt ([email or web address] )    • UTI (urinary tract infection)    • Vitamin D deficiency        Past Surgical History:   Procedure Laterality Date   • BARIATRIC SURGERY  2020    sleeve   •  SECTION     • FOREARM SURGERY Left    • INDUCED       x3   • LAPAROSCOPIC CHOLECYSTECTOMY      Last Assessed: 2016   • LAPAROSCOPY      Exploratory   • SLEEVE GASTROPLASTY     • TONSILLECTOMY      Onset: [id number]       Family History   Problem Relation Age of Onset   • Hyperthyroidism Mother    • Thyroid disease Mother    • Depression Father    • Hypertension Father    • Obesity Father    • Other Paternal Grandmother         Cardiac Disorder   • Dementia Paternal Grandmother    • Hypertension Paternal Grandmother    • Diabetes Paternal Grandmother    • Hypertension Paternal Grandfather    • Other Paternal Grandfather         Cardiac Disorder   • Diabetes Paternal Grandfather    • Schizophrenia Other    • Schizophrenia Other    • Suicidality Other    • Completed Suicide  Other    • Breast cancer Other    • Hypertension Paternal Aunt    • No Known Problems Maternal Grandmother    • No Known Problems Maternal Grandfather    • No Known Problems Son    • No Known Problems Son    • No Known Problems Brother    • No Known Problems Maternal Aunt    • Stroke Neg Hx    • Drug abuse Neg Hx      I have reviewed and agree with the history as documented  E-Cigarette/Vaping   • E-Cigarette Use Never User      E-Cigarette/Vaping Substances   • Nicotine No    • THC No    • CBD No    • Flavoring No    • Other No    • Unknown No      Social History     Tobacco Use   • Smoking status: Former     Packs/day: 0 25     Years: 3 00     Pack years: 0 75     Types: Cigarettes     Quit date: 10/22/2021     Years since quittin 3   • Smokeless tobacco: Never   • Tobacco comments:     Quit   Vaping Use   • Vaping Use: Never used   Substance Use Topics   • Alcohol use: Yes     Comment: social   • Drug use: No     Comment: Past cocaine and marijuana use for 2 years until   Also tried LSD years ago  No current recreational drug use        Review of Systems   All other systems reviewed and are negative  Physical Exam  ED Triage Vitals [03/10/23 0602]   Temperature Pulse Respirations Blood Pressure SpO2   98 2 °F (36 8 °C) 87 18 140/72 99 %      Temp src Heart Rate Source Patient Position - Orthostatic VS BP Location FiO2 (%)   -- Monitor Lying Right arm --      Pain Score       3             Orthostatic Vital Signs  Vitals:    03/10/23 0602   BP: 140/72   Pulse: 87   Patient Position - Orthostatic VS: Lying       Physical Exam  Vitals and nursing note reviewed  Constitutional:       General: She is not in acute distress  Appearance: She is well-developed  HENT:      Head: Normocephalic and atraumatic        Mouth/Throat:      Mouth: Mucous membranes are moist    Eyes:      Conjunctiva/sclera: Conjunctivae normal  Cardiovascular:      Rate and Rhythm: Normal rate and regular rhythm  Heart sounds: No murmur heard  Pulmonary:      Effort: Pulmonary effort is normal  No respiratory distress  Breath sounds: Normal breath sounds  Abdominal:      Palpations: Abdomen is soft  Tenderness: There is no abdominal tenderness  Musculoskeletal:         General: No swelling  Cervical back: Neck supple  Skin:     General: Skin is warm and dry  Capillary Refill: Capillary refill takes less than 2 seconds  Neurological:      General: No focal deficit present  Mental Status: She is alert     Psychiatric:         Mood and Affect: Mood normal          ED Medications  Medications   sodium chloride 0 9 % bolus 500 mL (0 mL Intravenous Stopped 3/10/23 0815)       Diagnostic Studies  Results Reviewed     Procedure Component Value Units Date/Time    HS Troponin 0hr (reflex protocol) [[id number]]  (Normal) Collected: 03/10/23 0628    Lab Status: Final result Specimen: Blood from Arm, Left Updated: 03/10/23 0730     hs TnI 0hr <2 ng/L     Comprehensive metabolic panel [[id number]] Collected: 03/10/23 0628    Lab Status: Final result Specimen: Blood from Arm, Left Updated: 03/10/23 0705     Sodium 137 mmol/L      Potassium 3 9 mmol/L      Chloride 106 mmol/L      CO2 24 mmol/L      ANION GAP 7 mmol/L      BUN 15 mg/dL      Creatinine 0 94 mg/dL      Glucose 97 mg/dL      Calcium 9 2 mg/dL      AST 9 U/L      ALT 25 U/L      Alkaline Phosphatase 83 U/L      Total Protein 7 7 g/dL      Albumin 3 7 g/dL      Total Bilirubin 0 24 mg/dL      eGFR 73 ml/min/1 73sq m     Narrative:      [place] guidelines for Chronic Kidney Disease (CKD):   •  Stage 1 with normal or high GFR (GFR > 90 mL/min/1 73 square meters)  •  Stage 2 Mild CKD (GFR = 60-89 mL/min/1 73 square meters)  •  Stage 3A Moderate CKD (GFR = 45-59 mL/min/1 73 square meters)  •  Stage 3B Moderate CKD (GFR = 30-44 mL/min/1 73 square meters)  •  Stage 4 Severe CKD (GFR = 15-29 mL/min/1 73 square meters)  •  Stage 5 End Stage CKD (GFR <15 mL/min/1 73 square meters)  Note: GFR calculation is accurate only with a steady state creatinine    CBC and differential [[id number]]  (Abnormal) Collected: 03/10/23 0628    Lab Status: Final result Specimen: Blood from Arm, Left Updated: 03/10/23 0644     WBC 7 00 Thousand/uL      RBC 4 24 Million/uL      Hemoglobin 10 4 g/dL      Hematocrit 33 2 %      MCV 78 fL      MCH 24 5 pg      MCHC 31 3 g/dL      RDW 14 8 %      MPV 9 5 fL      Platelets 487 Thousands/uL      nRBC 0 /100 WBCs      Neutrophils Relative 57 %      Immat GRANS % 1 %      Lymphocytes Relative 32 %      Monocytes Relative 7 %      Eosinophils Relative 2 %      Basophils Relative 1 %      Neutrophils Absolute 4 01 Thousands/µL      Immature Grans Absolute 0 04 Thousand/uL      Lymphocytes Absolute 2 21 Thousands/µL      Monocytes Absolute 0 49 Thousand/µL      Eosinophils Absolute 0 17 Thousand/µL      Basophils Absolute 0 08 Thousands/µL                  No orders to display         Procedures  ECG 12 Lead Documentation Only    Date/Time: 3/10/2023 6:32 AM  Performed by: Marcella Pires MD  Authorized by: Marcella Pires MD     ECG reviewed by me, the ED Provider: yes    Patient location:  ED  Previous ECG:     Previous ECG:  Compared to current    Similarity:  No change  Interpretation:     Interpretation: normal    Rate:     ECG rate assessment: normal    Rhythm:     Rhythm: sinus rhythm    Ectopy:     Ectopy: none    QRS:     QRS axis:  Normal    QRS intervals:  Normal  Conduction:     Conduction: normal    ST segments:     ST segments:  Normal  T waves:     T waves: normal            ED Course             HEART Risk Score    Flowsheet Row Most Recent Value   Heart Score Risk Calculator    History 0 Filed at: 03/11/2023 1738   ECG 0 Filed at: 03/11/2023 1738   Age --   Risk Factors 1 Filed at: 03/11/2023 1738   Troponin 0 Filed at: 03/11/2023 1738   HEART Score --                      SBIRT 20yo+    Flowsheet Row Most Recent Value   SBIRT (23 yo +)    In order to provide better care to our patients, we are screening all of our patients for alcohol and drug use  Would it be okay to ask you these screening questions? Unable to answer at this time Filed at: 03/10/2023 [address] Making  51-year-old female with history of SVT presenting to the emergency department due to palpitations this morning as well as mild chest pain and shortness of breath during these palpitations  Will obtain cardiac work-up as well as evaluate for electrolyte or anemia that could cause these palpitations  Other possible causes include caffeine use  Unlikely to be pulmonary embolism  At this point in time, patient's vital signs are stable and exam is reassuring  Patient was signed out at this time to Dr Chuck Sever and ultimately discharged  Amount and/or Complexity of Data Reviewed  Labs: ordered  ECG/medicine tests: ordered  Disposition  Final diagnoses:   Palpitations     Time reflects when diagnosis was documented in both MDM as applicable and the Disposition within this note     Time User Action Codes Description Comment    3/10/2023  7:32 AM Rosy Brown Add [R00 2] Palpitations       ED Disposition     ED Disposition   Discharge    Condition   Stable    Date/Time   Fri Mar 10, 2023  7:32 AM    Comment   Maximino Rosado discharge to home/self care                 Follow-up Information     Follow up With Specialties Details Why Contact Info Additional Information    Sadie Rodrigues MD Internal Medicine   [facility] [address] [place] [id number]  [address] Emergency Department Emergency Medicine Go to  If symptoms worsen [address] [phone]  [address] Emergency Department, [address], [id number] [address] [facility] [address] [facility]   [address] [facility] [place] [address] [phone]  [address] [place] [address]  [facility] [address] [facility], [address] [place] [address], [phone]        [phone]          Discharge Medication List as of 3/10/2023  7:36 AM      CONTINUE these medications which have NOT CHANGED    Details   atomoxetine (STRATTERA) 80 MG capsule Take 1 capsule (80 mg total) by mouth daily, Starting Wed 2/15/2023, Until Fri 3/17/2023, Normal      carBAMazepine (TEGretol) 200 mg tablet Take 1 tablet (200 mg total) by mouth 2 (two) times a day, Starting Tue 9/20/2022, Until Fri 2/17/2023, Normal      DULoxetine (Cymbalta) 60 mg delayed release capsule Take 1 capsule (60 mg total) by mouth daily, Starting Tue 9/20/2022, Until Fri 2/17/2023, Normal      hydrOXYzine HCL (ATARAX) 25 mg tablet Take 1 tablet (25 mg total) by mouth 3 (three) times a day, Starting Tue 9/20/2022, Until Fri 2/17/2023, Normal      lamoTRIgine (LaMICtal) 200 MG tablet Take 2 tablets (400 mg total) by mouth daily at bedtime, Starting Wed 1/25/2023, Until Fri 2/24/2023, Normal      Multiple Vitamins-Minerals (HAIR SKIN NAILS PO) Take by mouth daily  , Historical Med      pantoprazole (PROTONIX) 40 mg tablet Take 40 mg by mouth daily , Starting Tue 11/10/2020, Historical Med      traZODone (DESYREL) 50 mg tablet Take 1-2 tablets ( mg total) by mouth daily at bedtime as needed for sleep, Starting Wed 1/25/2023, Until Fri 2/24/2023 at 2359, Normal           Outpatient Discharge Orders   Ambulatory Referral to Cardiology   Standing Status: Future Standing Exp  Date: 03/10/24      Holter monitor   Standing Status: Future Standing Exp  Date: 03/10/27       PDMP Review       Value Time User    PDMP Reviewed  Yes 3/11/2022  5:02 PM Delvis Rodrigues MD           ED Provider  Attending physically available and evaluated Tyron Liang I managed the patient along with the ED Attending      Electronically Signed by         Glendy Tracy MD  03/11/23 4516

## 2023-03-10 NOTE — Clinical Note
Lexi Miguel accompanied Liza Curry to the emergency department on 3/10/2023  Return date if applicable: 87/93/5637    ? If you have any questions or concerns, please don't hesitate to call        Fransico Avila MD

## 2023-03-10 NOTE — TELEPHONE ENCOUNTER
Patient advised to go to the emergency room for a further evaluation  Patient was placed on [employer] tracking board  Reason for Disposition  • Difficulty breathing    Answer Assessment - Initial Assessment Questions  1  DESCRIPTION: "Please describe your heart rate or heartbeat that you are having" (e g , fast/slow, regular/irregular, skipped or extra beats, "palpitations")      "It is racing and pounding  It takes your breath away  I tried to do the vagal maneuver  It stops for a little and comes back  It is worse when I am laying on my left side "   2  ONSET: "When did it start?" (Minutes, hours or days)       4:10am   3  DURATION: "How long does it last" (e g , seconds, minutes, hours)      "It lasted about 20 minutes  I still have a little now "   4  PATTERN "Does it come and go, or has it been constant since it started?"  "Does it get worse with exertion?"   "Are you feeling it now?"      Comes and goes   5  TAP: "Using your hand, can you tap out what you are feeling on a chair or table in front of you, so that I can hear?" (Note: not all patients can do this)        N/A   6  HEART RATE: "Can you tell me your heart rate?" "How many beats in 15 seconds?"  (Note: not all patients can do this)        118-125 "It will go down to 75-80 and then bounce right back up "   7  RECURRENT SYMPTOM: "Have you ever had this before?" If Yes, ask: "When was the last time?" and "What happened that time?"       "I have SVT so I get it a lot  It doesn't usually last this long "  8  CAUSE: "What do you think is causing the palpitations?"      SVT   9  CARDIAC HISTORY: "Do you have any history of heart disease?" (e g , heart attack, angina, bypass surgery, angioplasty, arrhythmia)       Denies   10  OTHER SYMPTOMS: "Do you have any other symptoms?" (e g , dizziness, chest pain, sweating, difficulty breathing)       Chest tightness, shortness of breath with increased heart rate improves when heart rate lowers  11  PREGNANCY: "Is there any chance you are pregnant?" "When was your last menstrual period?"       Denies    Protocols used: HEART RATE AND HEARTBEAT QUESTIONS-ADULT-AH

## 2023-03-10 NOTE — TELEPHONE ENCOUNTER
Regarding: Pulse issue  ----- Message from [facility] sent at 3/10/2023  4:33 AM EST -----  "When lying down my pulse races to 115 and stops when sitting up   I am not sure what to do "

## 2023-03-10 NOTE — DISCHARGE INSTRUCTIONS
You have been evaluated in the Emergency Department today for palpitations  Your evaluation, including laboratory and EKG tests, suggests that your symptoms are not an acute emergency requiring admission to the hospital     Please follow up with your primary care physician within two days  Return to the Emergency Department if you experience recurrence of worsening of your symptoms  Thank you for choosing us for your care

## 2023-03-15 NOTE — ED ATTENDING ATTESTATION
3/10/2023   IAngela MD, saw and evaluated the patient  I have discussed the patient with the resident/non-physician practitioner and agree with the resident's/non-physician practitioner's findings, Plan of Care, and MDM as documented in the resident's/non-physician practitioner's note, except where noted  All available labs and Radiology studies were reviewed  I was present for key portions of any procedure(s) performed by the resident/non-physician practitioner and I was immediately available to provide assistance  At this point I agree with the current assessment done in the Emergency Department  I have conducted an independent evaluation of this patient a history and physical is as follows:    Chief Complaint   Patient presents with   • Palpitations     Pt states past hx of SVT  Tonight states she feels her heart "flutter" on and off, causing chest discomfort and sob  Pt states she is usually able to get out of SVT on her own but can't get palpations to stop  Physical Exam  /72 (BP Location: Right arm)   Pulse 87   Temp 98 2 °F (36 8 °C)   Resp 18   SpO2 99%      Vital signs and nursing notes reviewed    ** IF YOU ARE READING THIS, THE EXAM TEMPLATE BELOW HAS NOT BEEN UPDATED**    CONSTITUTIONAL: female appearing stated age resting in bed, in no acute distress  HEENT: atraumatic, normocephalic  Sclera anicteric, conjunctiva are not injected  Moist oral mucosa  CARDIOVASCULAR/CHEST: RRR, no M/R/G  2+ radial pulses  PULMONARY: Breathing comfortably on RA  Breath sounds are equal and clear to auscultation  ABDOMEN: non-distended  BS present, normoactive  Non-tender  MSK: moves all extremities, no deformities, no peripheral edema, no calf asymmetry  NEURO: Awake, alert, and oriented x 3  Face symmetric  Moves all extremities spontaneously   No focal neurologic deficits  SKIN: Warm, appears well-perfused  MENTAL STATUS: Normal affect      Labs and Imaging  Labs Reviewed   CBC AND DIFFERENTIAL - Abnormal       Result Value Ref Range Status    WBC 7 00  4 31 - 10 16 Thousand/uL Final    RBC 4 24  3 81 - 5 12 Million/uL Final    Hemoglobin 10 4 (*) 11 5 - 15 4 g/dL Final    Hematocrit 33 2 (*) 34 8 - 46 1 % Final    MCV 78 (*) 82 - 98 fL Final    MCH 24 5 (*) 26 8 - 34 3 pg Final    MCHC 31 3 (*) 31 4 - 37 4 g/dL Final    RDW 14 8  11 6 - 15 1 % Final    MPV 9 5  8 9 - 12 7 fL Final    Platelets 267 (*) 151 - 390 Thousands/uL Final    nRBC 0  /100 WBCs Final    Neutrophils Relative 57  43 - 75 % Final    Immat GRANS % 1  0 - 2 % Final    Lymphocytes Relative 32  14 - 44 % Final    Monocytes Relative 7  4 - 12 % Final    Eosinophils Relative 2  0 - 6 % Final    Basophils Relative 1  0 - 1 % Final    Neutrophils Absolute 4 01  1 85 - 7 62 Thousands/µL Final    Immature Grans Absolute 0 04  0 00 - 0 20 Thousand/uL Final    Lymphocytes Absolute 2 21  0 60 - 4 47 Thousands/µL Final    Monocytes Absolute 0 49  0 17 - 1 22 Thousand/µL Final    Eosinophils Absolute 0 17  0 00 - 0 61 Thousand/µL Final    Basophils Absolute 0 08  0 00 - 0 10 Thousands/µL Final   HS TROPONIN I 0HR - Normal    hs TnI 0hr <2  "Refer to ACS Flowchart"- see link ng/L Final    Comment:                                              Initial (time 0) result  If >=50 ng/L, Myocardial injury suggested ;  Type of myocardial injury and treatment strategy  to be determined  If 5-49 ng/L, a delta result at 2 hours and or 4 hours will be needed to further evaluate  If <4 ng/L, and chest pain has been >3 hours since onset, patient may qualify for discharge based on the HEART score in the ED  If <5 ng/L and <3hours since onset of chest pain, a delta result at 2 hours will be needed to further evaluate  HS Troponin 99th Percentile URL of a Health Population=12 ng/L with a 95% Confidence Interval of 8-18 ng/L      Second Troponin (time 2 hours)  If calculated delta >= 20 ng/L,  Myocardial injury suggested ; Type of myocardial injury and treatment strategy to be determined  If 5-49 ng/L and the calculated delta is 5-19 ng/L, consult medical service for evaluation  Continue evaluation for ischemia on ecg and other possible etiology and repeat hs troponin at 4 hours  If delta is <5 ng/L at 2 hours, consider discharge based on risk stratification via the HEART score (if in ED), or JOE risk score in IP/Observation  HS Troponin 99th Percentile URL of a Health Population=12 ng/L with a 95% Confidence Interval of 8-18 ng/L  COMPREHENSIVE METABOLIC PANEL    Sodium 257  135 - 147 mmol/L Final    Potassium 3 9  3 5 - 5 3 mmol/L Final    Chloride 106  96 - 108 mmol/L Final    CO2 24  21 - 32 mmol/L Final    ANION GAP 7  4 - 13 mmol/L Final    BUN 15  5 - 25 mg/dL Final    Creatinine 0 94  0 60 - 1 30 mg/dL Final    Comment: Standardized to IDMS reference method    Glucose 97  65 - 140 mg/dL Final    Comment: If the patient is fasting, the ADA then defines impaired fasting glucose as > 100 mg/dL and diabetes as > or equal to 123 mg/dL  Specimen collection should occur prior to Sulfasalazine administration due to the potential for falsely depressed results  Specimen collection should occur prior to Sulfapyridine administration due to the potential for falsely elevated results  Calcium 9 2  8 3 - 10 1 mg/dL Final    AST 9  5 - 45 U/L Final    Comment: Specimen collection should occur prior to Sulfasalazine administration due to the potential for falsely depressed results  ALT 25  12 - 78 U/L Final    Comment: Specimen collection should occur prior to Sulfasalazine and/or Sulfapyridine administration due to the potential for falsely depressed results       Alkaline Phosphatase 83  46 - 116 U/L Final    Total Protein 7 7  6 4 - 8 4 g/dL Final    Albumin 3 7  3 5 - 5 0 g/dL Final    Total Bilirubin 0 24  0 20 - 1 00 mg/dL Final    Comment: Use of this assay is not recommended for patients undergoing treatment with eltrombopag due to the potential for falsely elevated results      eGFR 73  ml/min/1 73sq m Final    Narrative:     [place] guidelines for Chronic Kidney Disease (CKD):   •  Stage 1 with normal or high GFR (GFR > 90 mL/min/1 73 square meters)  •  Stage 2 Mild CKD (GFR = 60-89 mL/min/1 73 square meters)  •  Stage 3A Moderate CKD (GFR = 45-59 mL/min/1 73 square meters)  •  Stage 3B Moderate CKD (GFR = 30-44 mL/min/1 73 square meters)  •  Stage 4 Severe CKD (GFR = 15-29 mL/min/1 73 square meters)  •  Stage 5 End Stage CKD (GFR <15 mL/min/1 73 square meters)  Note: GFR calculation is accurate only with a steady state creatinine       No orders to display         Procedures  Procedures        ED Course  Medications   sodium chloride 0 9 % bolus 500 mL (0 mL Intravenous Stopped 3/10/23 0815)

## 2023-03-15 NOTE — ED ATTENDING ATTESTATION
3/10/2023   ISissy MD, saw and evaluated the patient  I have discussed the patient with the resident/non-physician practitioner and agree with the resident's/non-physician practitioner's findings, Plan of Care, and MDM as documented in the resident's/non-physician practitioner's note, except where noted  All available labs and Radiology studies were reviewed  I was present for key portions of any procedure(s) performed by the resident/non-physician practitioner and I was immediately available to provide assistance  At this point I agree with the current assessment done in the Emergency Department  I have conducted an independent evaluation of this patient a history and physical is as follows:    Chief Complaint   Patient presents with   • Palpitations     Pt states past hx of SVT  Tonight states she feels her heart "flutter" on and off, causing chest discomfort and sob  Pt states she is usually able to get out of SVT on her own but can't get palpations to stop  Physical Exam  /72 (BP Location: Right arm)   Pulse 87   Temp 98 2 °F (36 8 °C)   Resp 18   SpO2 99%      Vital signs and nursing notes reviewed    ** IF YOU ARE READING THIS, THE EXAM TEMPLATE BELOW HAS NOT BEEN UPDATED**    CONSTITUTIONAL: female appearing stated age resting in bed, in no acute distress  HEENT: atraumatic, normocephalic  Sclera anicteric, conjunctiva are not injected  Moist oral mucosa  CARDIOVASCULAR/CHEST: RRR, no M/R/G  2+ radial pulses  PULMONARY: Breathing comfortably on RA  Breath sounds are equal and clear to auscultation  ABDOMEN: non-distended  BS present, normoactive  Non-tender  MSK: moves all extremities, no deformities, no peripheral edema, no calf asymmetry  NEURO: Awake, alert, and oriented x 3  Face symmetric  Moves all extremities spontaneously   No focal neurologic deficits  SKIN: Warm, appears well-perfused  MENTAL STATUS: Normal affect      Labs and Imaging  Labs Reviewed   CBC AND DIFFERENTIAL - Abnormal       Result Value Ref Range Status    WBC 7 00  4 31 - 10 16 Thousand/uL Final    RBC 4 24  3 81 - 5 12 Million/uL Final    Hemoglobin 10 4 (*) 11 5 - 15 4 g/dL Final    Hematocrit 33 2 (*) 34 8 - 46 1 % Final    MCV 78 (*) 82 - 98 fL Final    MCH 24 5 (*) 26 8 - 34 3 pg Final    MCHC 31 3 (*) 31 4 - 37 4 g/dL Final    RDW 14 8  11 6 - 15 1 % Final    MPV 9 5  8 9 - 12 7 fL Final    Platelets 749 (*) 288 - 390 Thousands/uL Final    nRBC 0  /100 WBCs Final    Neutrophils Relative 57  43 - 75 % Final    Immat GRANS % 1  0 - 2 % Final    Lymphocytes Relative 32  14 - 44 % Final    Monocytes Relative 7  4 - 12 % Final    Eosinophils Relative 2  0 - 6 % Final    Basophils Relative 1  0 - 1 % Final    Neutrophils Absolute 4 01  1 85 - 7 62 Thousands/µL Final    Immature Grans Absolute 0 04  0 00 - 0 20 Thousand/uL Final    Lymphocytes Absolute 2 21  0 60 - 4 47 Thousands/µL Final    Monocytes Absolute 0 49  0 17 - 1 22 Thousand/µL Final    Eosinophils Absolute 0 17  0 00 - 0 61 Thousand/µL Final    Basophils Absolute 0 08  0 00 - 0 10 Thousands/µL Final   HS TROPONIN I 0HR - Normal    hs TnI 0hr <2  "Refer to ACS Flowchart"- see link ng/L Final    Comment:                                              Initial (time 0) result  If >=50 ng/L, Myocardial injury suggested ;  Type of myocardial injury and treatment strategy  to be determined  If 5-49 ng/L, a delta result at 2 hours and or 4 hours will be needed to further evaluate  If <4 ng/L, and chest pain has been >3 hours since onset, patient may qualify for discharge based on the HEART score in the ED  If <5 ng/L and <3hours since onset of chest pain, a delta result at 2 hours will be needed to further evaluate  HS Troponin 99th Percentile URL of a Health Population=12 ng/L with a 95% Confidence Interval of 8-18 ng/L      Second Troponin (time 2 hours)  If calculated delta >= 20 ng/L,  Myocardial injury suggested ; Type of myocardial injury and treatment strategy to be determined  If 5-49 ng/L and the calculated delta is 5-19 ng/L, consult medical service for evaluation  Continue evaluation for ischemia on ecg and other possible etiology and repeat hs troponin at 4 hours  If delta is <5 ng/L at 2 hours, consider discharge based on risk stratification via the HEART score (if in ED), or JOE risk score in IP/Observation  HS Troponin 99th Percentile URL of a Health Population=12 ng/L with a 95% Confidence Interval of 8-18 ng/L  COMPREHENSIVE METABOLIC PANEL    Sodium 354  135 - 147 mmol/L Final    Potassium 3 9  3 5 - 5 3 mmol/L Final    Chloride 106  96 - 108 mmol/L Final    CO2 24  21 - 32 mmol/L Final    ANION GAP 7  4 - 13 mmol/L Final    BUN 15  5 - 25 mg/dL Final    Creatinine 0 94  0 60 - 1 30 mg/dL Final    Comment: Standardized to IDMS reference method    Glucose 97  65 - 140 mg/dL Final    Comment: If the patient is fasting, the ADA then defines impaired fasting glucose as > 100 mg/dL and diabetes as > or equal to 123 mg/dL  Specimen collection should occur prior to Sulfasalazine administration due to the potential for falsely depressed results  Specimen collection should occur prior to Sulfapyridine administration due to the potential for falsely elevated results  Calcium 9 2  8 3 - 10 1 mg/dL Final    AST 9  5 - 45 U/L Final    Comment: Specimen collection should occur prior to Sulfasalazine administration due to the potential for falsely depressed results  ALT 25  12 - 78 U/L Final    Comment: Specimen collection should occur prior to Sulfasalazine and/or Sulfapyridine administration due to the potential for falsely depressed results       Alkaline Phosphatase 83  46 - 116 U/L Final    Total Protein 7 7  6 4 - 8 4 g/dL Final    Albumin 3 7  3 5 - 5 0 g/dL Final    Total Bilirubin 0 24  0 20 - 1 00 mg/dL Final    Comment: Use of this assay is not recommended for patients undergoing treatment with eltrombopag due to the potential for falsely elevated results      eGFR 73  ml/min/1 73sq m Final    Narrative:     [place] guidelines for Chronic Kidney Disease (CKD):   •  Stage 1 with normal or high GFR (GFR > 90 mL/min/1 73 square meters)  •  Stage 2 Mild CKD (GFR = 60-89 mL/min/1 73 square meters)  •  Stage 3A Moderate CKD (GFR = 45-59 mL/min/1 73 square meters)  •  Stage 3B Moderate CKD (GFR = 30-44 mL/min/1 73 square meters)  •  Stage 4 Severe CKD (GFR = 15-29 mL/min/1 73 square meters)  •  Stage 5 End Stage CKD (GFR <15 mL/min/1 73 square meters)  Note: GFR calculation is accurate only with a steady state creatinine       No orders to display         Procedures  Procedures        ED Course  Medications   sodium chloride 0 9 % bolus 500 mL (0 mL Intravenous Stopped 3/10/23 0815) Troponin 0 Filed at: 03/10/2023 0730   HEART Score 2 Filed at: 03/10/2023 0730                ED Course  Medications   sodium chloride 0 9 % bolus 500 mL (0 mL Intravenous Stopped 3/10/23 0815)        26-year-old female with remote history of SVT presenting with palpitations with associated midsternal chest pain and shortness of breath during palpitations  Vital signs reviewed, at present, afebrile, not tachycardic, not hypotensive, not hypoxic  Differential diagnosis includes supraventricular tachycardia versus another dysrhythmia, including due to etiologies such as electrolyte abnormality, acute coronary syndrome, pulmonary embolism, infection, stimulant use such as caffeine, versus another etiology of symptoms  [place] PE criteria places patient at a low risk for pulmonary embolism, there is no indication for further PE work-up at this time  EKG obtained, as read by me, as above, no ischemic changes  Patient has not had any change in stimulant use, doubt caffeine is contributing  Labs reveal unremarkable CMP, normal troponin, and CBC with mild anemia of 10 4  At this time, patient is deemed safe to discharge to home with recommendations to follow-up with cardiology as well as with primary care  Return to ER if symptoms return or with any new or worsening symptoms

## 2023-03-21 ENCOUNTER — CONSULT (OUTPATIENT)
Dept: INTERNAL MEDICINE CLINIC | Facility: OTHER | Age: 48
End: 2023-03-21

## 2023-03-21 VITALS
TEMPERATURE: 98.6 F | WEIGHT: 198.6 LBS | RESPIRATION RATE: 18 BRPM | BODY MASS INDEX: 36.55 KG/M2 | OXYGEN SATURATION: 99 % | SYSTOLIC BLOOD PRESSURE: 130 MMHG | HEIGHT: 62 IN | DIASTOLIC BLOOD PRESSURE: 84 MMHG | HEART RATE: 77 BPM

## 2023-03-21 DIAGNOSIS — Z01.818 PREOP EXAM FOR INTERNAL MEDICINE: Primary | ICD-10-CM

## 2023-03-21 DIAGNOSIS — K31.9 GASTROPATHY: ICD-10-CM

## 2023-03-21 DIAGNOSIS — G47.09 OTHER INSOMNIA: Chronic | ICD-10-CM

## 2023-03-21 DIAGNOSIS — K91.2 POSTSURGICAL MALABSORPTION: ICD-10-CM

## 2023-03-21 DIAGNOSIS — N93.9 ABNORMAL UTERINE BLEEDING (AUB): ICD-10-CM

## 2023-03-21 DIAGNOSIS — K42.9 UMBILICAL HERNIA WITHOUT OBSTRUCTION AND WITHOUT GANGRENE: ICD-10-CM

## 2023-03-21 DIAGNOSIS — Z87.820 HISTORY OF MULTIPLE CONCUSSIONS: ICD-10-CM

## 2023-03-21 DIAGNOSIS — I47.1 PAROXYSMAL SUPRAVENTRICULAR TACHYCARDIA (HCC): ICD-10-CM

## 2023-03-21 DIAGNOSIS — F41.1 GAD (GENERALIZED ANXIETY DISORDER): Chronic | ICD-10-CM

## 2023-03-21 DIAGNOSIS — N60.02 BENIGN CYST OF LEFT BREAST: ICD-10-CM

## 2023-03-21 DIAGNOSIS — F31.75 BIPOLAR I DISORDER, MOST RECENT EPISODE DEPRESSED, IN PARTIAL REMISSION (HCC): Chronic | ICD-10-CM

## 2023-03-21 DIAGNOSIS — Z98.84 STATUS POST LAPAROSCOPIC SLEEVE GASTRECTOMY: ICD-10-CM

## 2023-03-21 DIAGNOSIS — F19.11 DRUG ABUSE IN REMISSION (HCC): ICD-10-CM

## 2023-03-21 DIAGNOSIS — F90.0 ADHD, PREDOMINANTLY INATTENTIVE TYPE: Chronic | ICD-10-CM

## 2023-03-21 DIAGNOSIS — E66.01 MORBID OBESITY (HCC): ICD-10-CM

## 2023-03-21 DIAGNOSIS — E55.9 VITAMIN D DEFICIENCY: ICD-10-CM

## 2023-03-21 PROBLEM — Z01.419 ENCOUNTER FOR GYNECOLOGICAL EXAMINATION (GENERAL) (ROUTINE) WITHOUT ABNORMAL FINDINGS: Status: RESOLVED | Noted: 2020-01-27 | Resolved: 2023-03-21

## 2023-03-21 PROBLEM — M75.51 SUBACROMIAL BURSITIS OF RIGHT SHOULDER JOINT: Status: RESOLVED | Noted: 2021-10-25 | Resolved: 2023-03-21

## 2023-03-21 PROBLEM — Z13.228 SCREENING FOR METABOLIC DISORDER: Status: RESOLVED | Noted: 2018-05-07 | Resolved: 2023-03-21

## 2023-03-21 PROBLEM — M62.830 BACK MUSCLE SPASM: Status: RESOLVED | Noted: 2021-10-26 | Resolved: 2023-03-21

## 2023-03-21 PROBLEM — Z79.899 LONG-TERM USE OF HIGH-RISK MEDICATION: Chronic | Status: RESOLVED | Noted: 2018-10-05 | Resolved: 2023-03-21

## 2023-03-21 PROBLEM — M75.81 TENDINITIS OF RIGHT ROTATOR CUFF: Status: RESOLVED | Noted: 2021-10-25 | Resolved: 2023-03-21

## 2023-03-21 PROBLEM — G47.33 OBSTRUCTIVE SLEEP APNEA TREATED WITH CONTINUOUS POSITIVE AIRWAY PRESSURE (CPAP): Status: RESOLVED | Noted: 2021-10-25 | Resolved: 2023-03-21

## 2023-03-21 PROBLEM — F07.81 POST CONCUSSION SYNDROME: Status: RESOLVED | Noted: 2021-10-26 | Resolved: 2023-03-21

## 2023-03-21 PROBLEM — Z97.5 IUD CONTRACEPTION: Status: RESOLVED | Noted: 2020-02-10 | Resolved: 2023-03-21

## 2023-03-21 PROBLEM — R07.81 RIB PAIN ON LEFT SIDE: Status: RESOLVED | Noted: 2021-10-26 | Resolved: 2023-03-21

## 2023-03-21 PROBLEM — Z99.89 OBSTRUCTIVE SLEEP APNEA TREATED WITH CONTINUOUS POSITIVE AIRWAY PRESSURE (CPAP): Status: RESOLVED | Noted: 2021-10-25 | Resolved: 2023-03-21

## 2023-03-21 PROBLEM — Z71.89 ENCOUNTER FOR PRE-BARIATRIC SURGERY COUNSELING AND EDUCATION: Status: RESOLVED | Noted: 2020-02-04 | Resolved: 2023-03-21

## 2023-03-21 NOTE — PROGRESS NOTES
Subjective:  Chief Complaint   Patient presents with   • Pre-op Exam     LV GYN, hysterectomy on 4/5/23  Dr Harley Inch  Fax [phone]         Linda Nuñez is a 41y o  year old female who presents to the office today for a preoperative consultation at the request of surgeon Dr Yi Ware who plans on performing robotic assisted total laparoscopic hysterectomy, bilateral salpingectomy, and cystoscopy on April 5  This consultation is requested for the specific conditions prompting preoperative evaluation (i e  because of potential affect on operative risk)  Planned anesthesia: general and IV sedation  The patient has the following known anesthesia issues: none  Patients bleeding risk: no recent abnormal bleeding, no remote history of abnormal bleeding and no use of Ca-channel blockers  Patient does not have objections to receiving blood products if needed  Patient is able to walk 4 blocks without symptoms  Patient is able to walk up 2 flights of stairs without symptoms  Significant past medical history includes obstructive sleep apnea, status post gastric sleeve, paroxysmal supraventricular tachycardia, anxiety, bipolar disorder, and ADHD  Tobacco use: Negative, former smoker  Alcohol use: Occasional  Illicit drug use: Negative    Symptoms:   Easy bleeding: no  Easy bruising: no  Frequent nose bleeds: no  Chest pain: no  Cough: no  Dyspnea on exertion:no  Edema: no  Palpitations: occasional, paroxysmal SVT  Wheezing: no    Living situation: Patient lives with  and children in a two-story residential home  Her  will be caring for her after surgery  shedoes not have post-op concerns       The following portions of the patient's history were reviewed and updated as appropriate: allergies, current medications, past family history, past medical history, past social history, past surgical history and problem list     Review of Systems  Review of Systems   Constitutional: Negative for activity change, appetite change, chills, diaphoresis, fatigue and fever  HENT: Negative for congestion, postnasal drip, rhinorrhea, sinus pressure, sinus pain, sneezing and sore throat  Eyes: Negative for visual disturbance  Respiratory: Negative for apnea, cough, choking, chest tightness, shortness of breath and wheezing  Cardiovascular: Negative for chest pain, palpitations and leg swelling  Gastrointestinal: Negative for abdominal distention, abdominal pain, anal bleeding, blood in stool, constipation, diarrhea, nausea and vomiting  Endocrine: Negative for cold intolerance and heat intolerance  Genitourinary: Negative for difficulty urinating, dysuria and hematuria  Musculoskeletal: Negative  Skin: Negative  Neurological: Negative for dizziness, weakness, light-headedness, numbness and headaches  Hematological: Negative for adenopathy  Psychiatric/Behavioral: Negative for agitation, sleep disturbance and suicidal ideas  All other systems reviewed and are negative          Past Medical History:   Diagnosis Date   • Acute pancreatitis     Last Assessed: 2016; due to gallstones (removed)   • Anemia    • Anxiety    • Arthralgia    • Concussion     Last Assessed: 2012   • Endometriosis    • Esophageal reflux    • Familial combined hyperlipidemia     Last Assessed: 2013   • Gastropathy    • Head injury    • History of sinusitis    • Irregular heart beat    • Migraine     Last Assessed: 2012   • Obstructive sleep apnea treated with continuous positive airway pressure (CPAP) 10/25/2021   • Paroxysmal supraventricular tachycardia (HCC)    • Peptic ulcer disease    • Post concussion syndrome 10/26/2021   • Spontaneous     • Substance abuse ([facility] [address] )    • Suicide attempt [facility])    • UTI (urinary tract infection)    • Vitamin D deficiency      Past Surgical History:   Procedure Laterality Date   • BARIATRIC SURGERY  2020    sleeve   •  SECTION     • FOREARM SURGERY Left    • INDUCED       x3   • LAPAROSCOPIC CHOLECYSTECTOMY      Last Assessed: 2016   • LAPAROSCOPY      Exploratory   • SLEEVE GASTROPLASTY     • TONSILLECTOMY      Onset: [id number]     Social History     Tobacco Use   • Smoking status: Former     Packs/day: 0 25     Years: 3 00     Pack years: 0 75     Types: Cigarettes     Start date:      Quit date: 10/22/2021     Years since quittin 4   • Smokeless tobacco: Never   • Tobacco comments:     Quit   Vaping Use   • Vaping Use: Never used   Substance Use Topics   • Alcohol use: Yes     Comment: social   • Drug use: No     Comment: Past cocaine and marijuana use for 2 years until   Also tried LSD years ago   No current recreational drug use     Family History   Problem Relation Age of Onset   • Hyperthyroidism Mother    • Thyroid disease Mother    • Depression Father    • Hypertension Father    • Obesity Father    • Other Paternal Grandmother         Cardiac Disorder   • Dementia Paternal Grandmother    • Hypertension Paternal Grandmother    • Diabetes Paternal Grandmother    • Hypertension Paternal Grandfather    • Other Paternal Grandfather         Cardiac Disorder   • Diabetes Paternal Grandfather    • Schizophrenia Other    • Schizophrenia Other    • Suicidality Other    • Completed Suicide  Other    • Breast cancer Other    • Hypertension Paternal Aunt    • No Known Problems Maternal Grandmother    • No Known Problems Maternal Grandfather    • No Known Problems Son    • No Known Problems Son    • No Known Problems Brother    • No Known Problems Maternal Aunt    • Stroke Neg Hx    • Drug abuse Neg Hx      Geodon [ziprasidone], Quetiapine, Iron, and Other  Current Outpatient Medications   Medication Sig Dispense Refill   • atomoxetine (STRATTERA) 80 MG capsule Take 1 capsule (80 mg total) by mouth daily 30 capsule 0   • carBAMazepine (TEGretol) 200 mg tablet Take 1 tablet (200 mg total) by mouth 2 (two) times a day (Patient taking differently: Take 200 mg by mouth in the morning) 60 tablet 4   • DULoxetine (Cymbalta) 60 mg delayed release capsule Take 1 capsule (60 mg total) by mouth daily 30 capsule 4   • hydrOXYzine HCL (ATARAX) 25 mg tablet Take 1 tablet (25 mg total) by mouth 3 (three) times a day (Patient taking differently: Take 50 mg by mouth 2 (two) times a day) 90 tablet 4   • lamoTRIgine (LaMICtal) 200 MG tablet Take 2 tablets (400 mg total) by mouth daily at bedtime 60 tablet 0   • pantoprazole (PROTONIX) 40 mg tablet Take 40 mg by mouth daily      • traZODone (DESYREL) 50 mg tablet Take 1-2 tablets ( mg total) by mouth daily at bedtime as needed for sleep (Patient taking differently: Take 100 mg by mouth daily at bedtime) 60 tablet 0     No current facility-administered medications for this visit  Objective:       /84 (BP Location: Left arm, Patient Position: Sitting, Cuff Size: Standard)   Pulse 77   Temp 98 6 °F (37 °C) (Temporal)   Resp 18   Ht 5' 2" (1 575 m)   Wt 90 1 kg (198 lb 9 6 oz)   SpO2 99%   BMI 36 32 kg/m²   Physical Exam  Vitals and nursing note reviewed  Constitutional:       General: She is not in acute distress  Appearance: She is well-developed  She is not diaphoretic  HENT:      Head: Normocephalic and atraumatic  Eyes:      General:         Right eye: No discharge  Left eye: No discharge  Conjunctiva/sclera: Conjunctivae normal       Pupils: Pupils are equal, round, and reactive to light  Neck:      Thyroid: No thyromegaly  Vascular: No JVD  Cardiovascular:      Rate and Rhythm: Normal rate and regular rhythm  Heart sounds: Normal heart sounds  No murmur heard  No friction rub  No gallop  Pulmonary:      Effort: Pulmonary effort is normal  No respiratory distress  Breath sounds: Normal breath sounds  No wheezing or rales  Chest:      Chest wall: No tenderness  Abdominal:      General: There is no distension        Palpations: Abdomen is soft       Tenderness: There is no abdominal tenderness  Musculoskeletal:         General: No tenderness or deformity  Normal range of motion  Cervical back: Normal range of motion and neck supple  Lymphadenopathy:      Cervical: No cervical adenopathy  Skin:     General: Skin is warm and dry  Coloration: Skin is not pale  Findings: No erythema or rash  Neurological:      Mental Status: She is alert and oriented to person, place, and time  Cranial Nerves: No cranial nerve deficit  Coordination: Coordination normal    Psychiatric:         Behavior: Behavior normal          Thought Content:  Thought content normal          Judgment: Judgment normal               Cardiographics  ECG: normal sinus rhythm, no blocks or conduction defects, no ischemic changes      Lab Review   Admission on 03/10/2023, Discharged on 03/10/2023   Component Date Value   • Ventricular Rate 03/10/2023 66    • Atrial Rate 03/10/2023 66    • ND Interval 03/10/2023 168    • QRSD Interval 03/10/2023 74    • QT Interval 03/10/2023 390    • QTC Interval 03/10/2023 408    • P Axis 03/10/2023 40    • QRS Axis 03/10/2023 31    • T Wave Axis 03/10/2023 20    • WBC 03/10/2023 7 00    • RBC 03/10/2023 4 24    • Hemoglobin 03/10/2023 10 4 (L)    • Hematocrit 03/10/2023 33 2 (L)    • MCV 03/10/2023 78 (L)    • MCH 03/10/2023 24 5 (L)    • MCHC 03/10/2023 31 3 (L)    • RDW 03/10/2023 14 8    • MPV 03/10/2023 9 5    • Platelets 77/40/6116 438 (H)    • nRBC 03/10/2023 0    • Neutrophils Relative 03/10/2023 57    • Immat GRANS % 03/10/2023 1    • Lymphocytes Relative 03/10/2023 32    • Monocytes Relative 03/10/2023 7    • Eosinophils Relative 03/10/2023 2    • Basophils Relative 03/10/2023 1    • Neutrophils Absolute 03/10/2023 4 01    • Immature Grans Absolute 03/10/2023 0 04    • Lymphocytes Absolute 03/10/2023 2 21    • Monocytes Absolute 03/10/2023 0 49    • Eosinophils Absolute 03/10/2023 0 17    • Basophils Absolute 03/10/2023 0 08    • Sodium 03/10/2023 137    • Potassium 03/10/2023 3 9    • Chloride 03/10/2023 106    • CO2 03/10/2023 24    • ANION GAP 03/10/2023 7    • BUN 03/10/2023 15    • Creatinine 03/10/2023 0 94    • Glucose 03/10/2023 97    • Calcium 03/10/2023 9 2    • AST 03/10/2023 9    • ALT 03/10/2023 25    • Alkaline Phosphatase 03/10/2023 83    • Total Protein 03/10/2023 7 7    • Albumin 03/10/2023 3 7    • Total Bilirubin 03/10/2023 0 24    • eGFR 03/10/2023 73    • hs TnI 0hr 03/10/2023 <2         Assessment:     41 y o  female with planned surgery as above  Known risk factors for perioperative complications: None      Cardiac Risk Estimation: low risk    Nimisha Breaux is cleared from a cardiovascular standpoint to proceed with surgery  she is at a low risk from a cardiovascular standpoint at this time without any additional cardiac testing  Reevaluation needed if he should present with symptoms prior to surgery  Plan:  Preoperative workup as follows none  Change in medication regimen before surgery: none, continue medication regimen including morning of surgery, with sip of water  Assessment/Plan:    Problem List Items Addressed This Visit        Digestive    Gastropathy    Postsurgical malabsorption       Cardiovascular and Mediastinum    Paroxysmal supraventricular tachycardia (HCC)       Genitourinary    Abnormal uterine bleeding (AUB)       Other    ADHD, predominantly inattentive type (Chronic)    Bipolar I disorder, most recent episode depressed, in partial remission (HCC) (Chronic)    SANA (generalized anxiety disorder) (Chronic)    Other insomnia (Chronic)    Drug abuse in remission ([facility] [address] )    Vitamin D deficiency    Morbid obesity ([facility] [address] )    Status post laparoscopic sleeve gastrectomy    Umbilical hernia without obstruction and without gangrene    Benign cyst of left breast    History of multiple concussions    Preop exam for internal medicine - Primary       BMI Counseling:  Body mass index is 36 32 kg/m²  The BMI is above normal  Nutrition recommendations include decreasing portion sizes, encouraging healthy choices of fruits and vegetables, decreasing fast food intake, consuming healthier snacks, limiting drinks that contain sugar, moderation in carbohydrate intake, increasing intake of lean protein, reducing intake of saturated and trans fat and reducing intake of cholesterol  Exercise recommendations include moderate physical activity 150 minutes/week and exercising 3-5 times per week  No pharmacotherapy was ordered  Patient referred to PCP  Rationale for BMI follow-up plan is due to patient being overweight or obese

## 2023-03-27 ENCOUNTER — TELEPHONE (OUTPATIENT)
Dept: PSYCHIATRY | Facility: CLINIC | Age: 48
End: 2023-03-27

## 2023-03-27 DIAGNOSIS — F41.1 GAD (GENERALIZED ANXIETY DISORDER): Chronic | ICD-10-CM

## 2023-03-27 DIAGNOSIS — F31.75 BIPOLAR I DISORDER, MOST RECENT EPISODE DEPRESSED, IN PARTIAL REMISSION (HCC): Primary | Chronic | ICD-10-CM

## 2023-03-27 DIAGNOSIS — F90.0 ADHD, PREDOMINANTLY INATTENTIVE TYPE: Chronic | ICD-10-CM

## 2023-03-27 DIAGNOSIS — G47.09 OTHER INSOMNIA: Chronic | ICD-10-CM

## 2023-03-27 RX ORDER — TRAZODONE HYDROCHLORIDE 50 MG/1
50-100 TABLET ORAL
Qty: 60 TABLET | Refills: 4 | Status: SHIPPED | OUTPATIENT
Start: 2023-03-27 | End: 2023-08-24

## 2023-03-27 RX ORDER — CARBAMAZEPINE 200 MG/1
200 TABLET ORAL 2 TIMES DAILY
Qty: 60 TABLET | Refills: 4 | Status: SHIPPED | OUTPATIENT
Start: 2023-03-27 | End: 2023-08-24

## 2023-03-27 RX ORDER — LAMOTRIGINE 200 MG/1
400 TABLET ORAL
Qty: 60 TABLET | Refills: 4 | Status: SHIPPED | OUTPATIENT
Start: 2023-03-27 | End: 2023-08-24

## 2023-03-27 RX ORDER — DULOXETIN HYDROCHLORIDE 60 MG/1
60 CAPSULE, DELAYED RELEASE ORAL DAILY
Qty: 30 CAPSULE | Refills: 4 | Status: SHIPPED | OUTPATIENT
Start: 2023-03-27 | End: 2023-08-24

## 2023-03-27 RX ORDER — ATOMOXETINE 80 MG/1
80 CAPSULE ORAL DAILY
Qty: 30 CAPSULE | Refills: 4 | Status: SHIPPED | OUTPATIENT
Start: 2023-03-27 | End: 2023-08-24

## 2023-03-27 RX ORDER — HYDROXYZINE HYDROCHLORIDE 25 MG/1
25 TABLET, FILM COATED ORAL 3 TIMES DAILY
Qty: 90 TABLET | Refills: 4 | Status: SHIPPED | OUTPATIENT
Start: 2023-03-27 | End: 2023-08-24

## 2023-03-27 NOTE — TELEPHONE ENCOUNTER
Lamictal, Tegretol, Strattera, Atarax, Cymbalta and R=Trazodone was all escripted for 30 days with 4 RF  Dot Vermilion had 2 No Show appointments since her last visit   She will need to call frequently for cancellations for a sooner appointment

## 2023-03-27 NOTE — TELEPHONE ENCOUNTER
Patient called and has been scheduled for 9/20/2023 at 2:30 in office  Appointment has also been placed on the cancellation list as well  Patient is requesting sooner appointment as her last appointment with provider was 9/20/2022  Patient is requesting that her refills be approved

## 2023-03-28 NOTE — TELEPHONE ENCOUNTER
Left message for patient informing her of refills  Let her know I put her on the cancellation list and gave Dr Marcella Bass advice for her to call periodically to inquire of any open appointments
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
## 2023-03-29 ENCOUNTER — TELEMEDICINE (OUTPATIENT)
Dept: PSYCHIATRY | Facility: CLINIC | Age: 48
End: 2023-03-29

## 2023-03-29 ENCOUNTER — TELEPHONE (OUTPATIENT)
Dept: PSYCHIATRY | Facility: CLINIC | Age: 48
End: 2023-03-29

## 2023-03-29 VITALS — HEIGHT: 62 IN | WEIGHT: 192 LBS | BODY MASS INDEX: 35.33 KG/M2

## 2023-03-29 DIAGNOSIS — F31.31 BIPOLAR I DISORDER, MOST RECENT EPISODE DEPRESSED, MILD (HCC): Primary | Chronic | ICD-10-CM

## 2023-03-29 DIAGNOSIS — G47.09 OTHER INSOMNIA: Chronic | ICD-10-CM

## 2023-03-29 DIAGNOSIS — Z79.899 LONG-TERM USE OF HIGH-RISK MEDICATION: Chronic | ICD-10-CM

## 2023-03-29 DIAGNOSIS — F90.0 ADHD, PREDOMINANTLY INATTENTIVE TYPE: Chronic | ICD-10-CM

## 2023-03-29 DIAGNOSIS — F41.1 GAD (GENERALIZED ANXIETY DISORDER): Chronic | ICD-10-CM

## 2023-03-29 RX ORDER — DULOXETIN HYDROCHLORIDE 30 MG/1
30 CAPSULE, DELAYED RELEASE ORAL DAILY
Qty: 30 CAPSULE | Refills: 4 | Status: SHIPPED | OUTPATIENT
Start: 2023-03-29 | End: 2023-08-26

## 2023-03-29 NOTE — BH TREATMENT PLAN
"TREATMENT PLAN (Medication Management Only)        [facility]    Name/Date of Birth/MRN:  Javan Boone 41 y o  1975 MRN: [id number]  Date of Treatment Plan: March 29, 2023  Diagnosis/Diagnoses:   1  Bipolar I disorder, most recent episode depressed, mild ([address] )    2  SANA (generalized anxiety disorder)    3  ADHD, predominantly inattentive type    4  Other insomnia    5  Long-term use of high-risk medication      Strengths/Personal Resources for Self-Care: \"I am self aware so I can be proactive seeking assistance\"  Area/Areas of need (in own words): \"improving my coping skills\"  1  Long Term Goal:   improve control of anxiety, improve control of depression, maintain mood stability  Target Date: 2 months - 5/29/2023  Person/Persons responsible for completion of goal: Margi Villanueva  Short Term Objective (s) - How will we reach this goal?:   A  Provider new recommended medication/dosage changes and/or continue medication(s): increase Cymbalta, continue all other medications (Trazodone, Tegretol, Lamictal, Buspar, Atarax and Strattera)  B   N/A   C   N/A  Target Date: 2 months - 5/29/2023  Person/Persons Responsible for Completion of Goal: Amarilis Gar   Progress Towards Goals: progressing  Treatment Modality: medication management every 2 months  Review due 180 days from date of this plan: 6 months - 9/29/2023  Expected length of service: ongoing treatment unless revised  My Physician/PA/NP and I have developed this plan together and I agree to work on the goals and objectives  I understand the treatment goals that were developed for my treatment    Electronic Signatures: on file (unless signed below)    Lizz Michael MD 03/29/23  "

## 2023-03-29 NOTE — TELEPHONE ENCOUNTER
Pt called to get her appt for 3/29/2023 at 3:00 p m  switched to a virtual visit due to pt is sick  Writer switched appt       Please send link to phone # [phone]    Message was sent to provider on Teams

## 2023-04-24 ENCOUNTER — TELEPHONE (OUTPATIENT)
Dept: INTERNAL MEDICINE CLINIC | Facility: OTHER | Age: 48
End: 2023-04-24

## 2023-04-26 ENCOUNTER — TELEPHONE (OUTPATIENT)
Dept: ADMINISTRATIVE | Facility: OTHER | Age: 48
End: 2023-04-26

## 2023-04-26 ENCOUNTER — OFFICE VISIT (OUTPATIENT)
Dept: INTERNAL MEDICINE CLINIC | Age: 48
End: 2023-04-26

## 2023-04-26 VITALS
SYSTOLIC BLOOD PRESSURE: 118 MMHG | DIASTOLIC BLOOD PRESSURE: 72 MMHG | WEIGHT: 199.4 LBS | HEART RATE: 84 BPM | OXYGEN SATURATION: 98 % | BODY MASS INDEX: 36.7 KG/M2 | HEIGHT: 62 IN | TEMPERATURE: 97.6 F

## 2023-04-26 DIAGNOSIS — Z12.11 ENCOUNTER FOR SCREENING FOR MALIGNANT NEOPLASM OF COLON: ICD-10-CM

## 2023-04-26 DIAGNOSIS — F41.1 GAD (GENERALIZED ANXIETY DISORDER): Chronic | ICD-10-CM

## 2023-04-26 DIAGNOSIS — Z12.31 ENCOUNTER FOR SCREENING MAMMOGRAM FOR MALIGNANT NEOPLASM OF BREAST: ICD-10-CM

## 2023-04-26 DIAGNOSIS — R42 EPISODIC LIGHTHEADEDNESS: Primary | ICD-10-CM

## 2023-04-26 RX ORDER — HYDROXYZINE HYDROCHLORIDE 25 MG/1
25 TABLET, FILM COATED ORAL 2 TIMES DAILY
Qty: 60 TABLET | Refills: 4 | Status: SHIPPED | OUTPATIENT
Start: 2023-04-26 | End: 2023-09-23

## 2023-04-26 RX ORDER — FERROUS SULFATE 325(65) MG
1 TABLET ORAL
COMMUNITY
Start: 2023-04-21 | End: 2023-05-02 | Stop reason: SINTOL

## 2023-04-26 NOTE — TELEPHONE ENCOUNTER
Upon review of the In Basket request we were able to locate, review, and update the patient chart as requested for Pap Smear (HPV) aka Cervical Cancer Screening  Any additional questions or concerns should be emailed to the Practice Liaisons via the appropriate education email address, please do not reply via In Basket      Thank you  Carley Muñoz MA

## 2023-04-26 NOTE — TELEPHONE ENCOUNTER
----- Message from Izabella Rosario MA sent at 4/26/2023  9:30 AM EDT -----  Regarding: Pap  04/26/23 9:30 AM    Hello, our patient Arnulfo Morgan has had Pap Smear (HPV) aka Cervical Cancer Screening completed/performed  Please assist in updating the patient chart by pulling the Care Everywhere (CE) document  The date of service is 02/14/2023       Thank you,  Izabella Rosario MA  [facility]

## 2023-04-26 NOTE — PROGRESS NOTES
[facility] PRIMARY CARE [facility]:  ASSESSMENT/PLAN:  Diagnoses and all orders for this visit:    Episodic lightheadedness:  · Recent ED visit for lightheadedness with syncope and collapse  · Patient has underwent extensive workup with unremarkable findings  · Negative orthostatics   · CT head and MRI brain negative  · CTA PE study and duplex US LE  · EKG and echo normal; zio patch currently in place  · TSH normal, but labs with YULISA  · BPPV testing negative   · Continue on Ferrous sulfate 325 mg daily  · Zio patch currently in place and patient to follow up with cardiology  · Increase PO intake, exercise, and use of compression stockings  · Meds reviewed and large concern that they may be contributing to s/s  · Patient is currently on following psychiatric medications:  · Trazodone  mg qHS PRN  · Lamictal 400 mg qHS  · Hydroxyzine 50 mg BID  · Duloxetine 90 mg daily   · Tegretol 200 mg BID  · Atomoxtein 80 gm daily  · Patient strongly encouraged to follow up with psychiatry to discuss current regimen  · Will decrease Atarax to 25 mg BID and Duloxetine to 60 mg daily at this time    Generalized anxiety disorder:  · History of SANA well controlled at this time, but concern medications may be leading to lightheadedness  · Will decrease Atarax from 50 mg BID to 25 mg BID  · Will decrease Duloxetine from 90 mg daily to 60 mg daily   · Strongly advised to follow up with psychiatry to discuss current regimen    Encounter of malignant neoplasm of the breast:  · Most recent mammogram completed 3/2021  · Bilateral screening mammogram ordered    Encounter for malignant neoplasm of the colon:  · No personal or family history of colon cancer  · No prior colonoscopy; patient without alarm symptoms  · Ambulatory referral to gastroenterology placed      Appointment previously scheduled for 5/30/2023 with patient's PCP, Dr Caroline Noyola MD      CHIEF COMPLAINT: ED follow up     HISTORY OF PRESENT ILLNESS:  Ms Niraj yT is a 41year old female with a PMHx of abnormal uterine bleeding s/p hysterectomy, ADHD, BP1, SANA, and prior sleep gastrectomy with postoperative malabsorption who presents to the office today, 4/26/2023, for an ED follow up visit  The patient was seen in [facility] ED on 4/20/2023 following recurrent episodes of syncope with collapse approx]  Episodes occurring daily and associated with lightheadedness/dizziness upon standing  While in the ED, the patient was hemodynamically stable on arrival and had negative orthostatics  Labs significant for a hemoglobin of 9 9 with iron studies consistent with YULISA  Ddimer was slightly elevated but CTA PE study negative along with LE duples US  However, additional workup including CTH, CXR, TSH, and EKG were all within normal limits  Furthermore, MRI brain was completed and negative and echo showed LVEF 55-60% with normal dilastolic function and trace valvular disease  BPPV evaluation also negative  At time of ED discharged, etiology of syncope unclear but thought to be cardiac or orthostatic in nature  Cardiology was consulted and Zio patch was delivered to patient prior to ED discharge  Patient was instructed to follow up outpatient with cardiology and her PCP  Patient states that she has been doing well since her ED visit, but continues to have episodes of lightheadedness/dizziness  She has had no more additional episodes of complete collapse with LOC  Patient denies feelings as if herself or the room is spinning, but feels as though she has tunnel vision and if the room is closing in on her  She also complains of lightheadedness  Symptoms occurring only with abrupt movement such as standing or when taking deep breaths  Symptoms improve after rest  The patient states that her symptoms have been ongoing for months now but worsened over the last two weeks following her hysterectomy  She denies any abdominal or back pain or recurrent vaginal bleeding at this time      The following portions of the patient's history were reviewed and updated as appropriate: allergies, current medications, past family history, past medical history, past social history, past surgical history and problem list     Review of Systems   Constitutional: Negative for activity change, appetite change, diaphoresis and unexpected weight change  Eyes: Negative for visual disturbance  Respiratory: Negative for choking, chest tightness, shortness of breath and wheezing  Cardiovascular: Positive for palpitations  Negative for chest pain and leg swelling  Gastrointestinal: Negative for abdominal distention, abdominal pain, constipation, diarrhea, nausea and vomiting  Endocrine: Negative for polydipsia, polyphagia and polyuria  Genitourinary: Negative for difficulty urinating, pelvic pain and vaginal bleeding  Musculoskeletal: Negative for back pain and gait problem  Skin: Negative for color change and pallor  Neurological: Positive for dizziness and light-headedness  Negative for weakness and headaches  Psychiatric/Behavioral: Negative for agitation and confusion  OBJECTIVE:  There were no vitals filed for this visit  Physical Exam  Constitutional:       General: She is not in acute distress  Appearance: She is normal weight  She is not ill-appearing or toxic-appearing  HENT:      Head: Normocephalic and atraumatic  Nose: Nose normal  No congestion  Mouth/Throat:      Mouth: Mucous membranes are moist       Pharynx: Oropharynx is clear  No oropharyngeal exudate  Eyes:      General: No scleral icterus  Conjunctiva/sclera: Conjunctivae normal    Cardiovascular:      Rate and Rhythm: Normal rate and regular rhythm  Pulses: Normal pulses  Heart sounds: Normal heart sounds  No murmur heard  Comments: Ziio patch in place  Pulmonary:      Effort: Pulmonary effort is normal  No respiratory distress  Breath sounds: Normal breath sounds  No wheezing, rhonchi or rales  Abdominal:      General: Abdomen is flat  Bowel sounds are normal  There is no distension  Tenderness: There is no abdominal tenderness  There is no guarding  Hernia: No hernia is present  Musculoskeletal:         General: Normal range of motion  Cervical back: Normal range of motion  No rigidity  Right lower leg: No edema  Left lower leg: No edema  Skin:     General: Skin is warm  Capillary Refill: Capillary refill takes less than 2 seconds  Coloration: Skin is not pale  Neurological:      General: No focal deficit present  Mental Status: She is alert and oriented to person, place, and time  Mental status is at baseline  Cranial Nerves: No cranial nerve deficit  Sensory: No sensory deficit  Motor: No weakness        Coordination: Coordination normal       Gait: Gait normal    Psychiatric:         Mood and Affect: Mood normal          Behavior: Behavior normal            Current Outpatient Medications:   •  atomoxetine (STRATTERA) 80 MG capsule, Take 1 capsule (80 mg total) by mouth daily, Disp: 30 capsule, Rfl: 4  •  carBAMazepine (TEGretol) 200 mg tablet, Take 1 tablet (200 mg total) by mouth 2 (two) times a day, Disp: 60 tablet, Rfl: 4  •  DULoxetine (CYMBALTA) 30 mg delayed release capsule, Take 1 capsule (30 mg total) by mouth daily Take with Cymbalta 60 mg for a total dose mg 90 mg daily, Disp: 30 capsule, Rfl: 4  •  DULoxetine (Cymbalta) 60 mg delayed release capsule, Take 1 capsule (60 mg total) by mouth daily, Disp: 30 capsule, Rfl: 4  •  ferrous sulfate 325 (65 Fe) mg tablet, Take 1 tablet by mouth daily before breakfast, Disp: , Rfl:   •  hydrOXYzine HCL (ATARAX) 25 mg tablet, Take 1 tablet (25 mg total) by mouth 3 (three) times a day (Patient taking differently: Take 25 mg by mouth 3 (three) times a day 50 mg in AM and 50 mg in PM), Disp: 90 tablet, Rfl: 4  •  lamoTRIgine (LaMICtal) 200 MG tablet, Take 2 tablets (400 mg total) by mouth daily at bedtime, Disp: 60 tablet, Rfl: 4  •  pantoprazole (PROTONIX) 40 mg tablet, Take 40 mg by mouth daily , Disp: , Rfl:   •  traZODone (DESYREL) 50 mg tablet, Take 1-2 tablets ( mg total) by mouth daily at bedtime as needed for sleep, Disp: 60 tablet, Rfl: 4    Past Medical History:   Diagnosis Date   • Acute pancreatitis     Last Assessed: 2016; due to gallstones (removed)   • Anemia    • Anxiety    • Arthralgia    • Concussion     Last Assessed: 2012   • Endometriosis    • Esophageal reflux    • Familial combined hyperlipidemia     Last Assessed: 2013   • Gastropathy    • Head injury    • History of sinusitis    • Irregular heart beat    • Migraine     Last Assessed: [id number]   • Obstructive sleep apnea treated with continuous positive airway pressure (CPAP) 10/25/2021   • Paroxysmal supraventricular tachycardia (HCC)    • Peptic ulcer disease    • Post concussion syndrome 10/26/2021   • Spontaneous     • Substance abuse ([facility] [address] )    • [facility] ([facility] )    • UTI (urinary tract infection)    • Vitamin D deficiency      Past Surgical History:   Procedure Laterality Date   • BARIATRIC SURGERY  2020    sleeve   •  SECTION     • FOREARM SURGERY Left    • INDUCED       x3   • LAPAROSCOPIC CHOLECYSTECTOMY      Last Assessed: 2016   • LAPAROSCOPY      Exploratory   • SLEEVE GASTROPLASTY     • TONSILLECTOMY      Onset: [id number]     Social History     Socioeconomic History   • Marital status: /Civil Union     Spouse name: Not on file   • Number of children: 2   • Years of education: bachelor's degree   • Highest education level:  Bachelor's degree (e g , BA, AB, BS)   Occupational History   • Occupation: on disability; 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   Tobacco Use   • Smoking status: Former     Packs/day: 0 25     Years: 3 00     Pack years: 0 75     Types: Cigarettes     Start date:      Quit date: 10/22/2021     Years since quittin 5 • Smokeless tobacco: Never   • Tobacco comments:     Quit   Vaping Use   • Vaping Use: Never used   Substance and Sexual Activity   • Alcohol use: Yes     Comment: social   • Drug use: No     Comment: Past cocaine and marijuana use for 2 years until 2001  Also tried LSD years ago  No current recreational drug use   • Sexual activity: Yes     Partners: Male     Birth control/protection: Male Sterilization   Other Topics Concern   • Not on file   Social History Narrative    Education: bachelor's degree in psychology; currently in [employer] degree program in Social Work at [address] [place] [address] Ave: none    Marital History:  (second marriage)    Children: 2 sons    Living Arrangement: lives in home with  and 2 sons    Occupational History: currently employed as a  at [employer]; 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[address] 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, on permanent disability    Functioning Relationships: good support system,  is supportive    Legal History: none     History: None     Social Determinants of Health     Financial Resource Strain: Low Risk    • Difficulty of Paying Living Expenses: Not hard at all   Food Insecurity: No Food Insecurity   • Worried About [address] in the Last Year: Never true   • [address] in the Last Year: Never true   Transportation Needs: No Transportation Needs   • Lack of Transportation (Medical): No   • Lack of Transportation (Non-Medical): No   Physical Activity: Sufficiently Active   • Days of Exercise per Week: 3 days   • Minutes of Exercise per Session: 50 min   Stress: No Stress Concern Present   • Feeling of Stress : Only a little   Social Connections:  Moderately Isolated   • Frequency of Communication with Friends and Family: More than three times a week   • Frequency of Social Gatherings with Friends and Family: More than three times a week   • Attends Catholic Services: Never   • Active Member of Clubs or Organizations: No   • Attends Club or Organization Meetings: Never   • Marital Status:    Intimate Partner Violence: Not At Risk   • Fear of Current or Ex-Partner: No   • Emotionally Abused: No   • Physically Abused: No   • Sexually Abused: No   Housing Stability: Low Risk    • Unable to Pay for Housing in the Last Year: No   • Number of Places Lived in the Last Year: 1   • Unstable Housing in the Last Year: No     Family History   Problem Relation Age of Onset   • Hyperthyroidism Mother    • Thyroid disease Mother    • Depression Father    • Hypertension Father    • Obesity Father    • Other Paternal Grandmother         Cardiac Disorder   • Dementia Paternal Grandmother    • Hypertension Paternal Grandmother    • Diabetes Paternal Grandmother    • Hypertension Paternal Grandfather    • Other Paternal Grandfather         Cardiac Disorder   • Diabetes Paternal Grandfather    • Schizophrenia Other    • Schizophrenia Other    • Suicidality Other    • Completed Suicide  Other    • Breast cancer Other    • Hypertension Paternal Aunt    • No Known Problems Maternal Grandmother    • No Known Problems Maternal Grandfather    • No Known Problems Son    • No Known Problems Son    • No Known Problems Brother    • No Known Problems Maternal Aunt    • Stroke Neg Hx    • Drug abuse Neg Hx        ==  Constance Kidd DO  [facility] Internal Medicine PGY-3

## 2023-05-02 ENCOUNTER — TELEPHONE (OUTPATIENT)
Dept: INTERNAL MEDICINE CLINIC | Facility: OTHER | Age: 48
End: 2023-05-02

## 2023-07-11 DIAGNOSIS — F31.75 BIPOLAR I DISORDER, MOST RECENT EPISODE DEPRESSED, IN PARTIAL REMISSION (HCC): Primary | Chronic | ICD-10-CM

## 2023-07-26 ENCOUNTER — TELEPHONE (OUTPATIENT)
Dept: INTERNAL MEDICINE CLINIC | Facility: OTHER | Age: 48
End: 2023-07-26

## 2023-07-26 DIAGNOSIS — F41.1 GAD (GENERALIZED ANXIETY DISORDER): Chronic | ICD-10-CM

## 2023-07-26 RX ORDER — HYDROXYZINE 50 MG/1
50 TABLET, FILM COATED ORAL 2 TIMES DAILY
Qty: 180 TABLET | Refills: 1 | Status: SHIPPED | OUTPATIENT
Start: 2023-07-26 | End: 2023-12-23

## 2023-07-26 NOTE — TELEPHONE ENCOUNTER
Patient called and had said the prescription of hydroxyzine was only ordered in 60 tablet but she is to take 4 pills for the day and is running out due to her not having enough tablets she went to pharmacy  and they told her the math of the medication script was incorrect

## 2023-07-31 RX ORDER — DULOXETIN HYDROCHLORIDE 60 MG/1
60 CAPSULE, DELAYED RELEASE ORAL DAILY
Qty: 30 CAPSULE | Refills: 1 | Status: SHIPPED | OUTPATIENT
Start: 2023-07-31 | End: 2023-07-31 | Stop reason: SDUPTHER

## 2023-07-31 RX ORDER — DULOXETIN HYDROCHLORIDE 60 MG/1
60 CAPSULE, DELAYED RELEASE ORAL DAILY
Qty: 30 CAPSULE | Refills: 1 | Status: SHIPPED | OUTPATIENT
Start: 2023-07-31 | End: 2023-09-29

## 2023-08-01 DIAGNOSIS — F31.75 BIPOLAR I DISORDER, MOST RECENT EPISODE DEPRESSED, IN PARTIAL REMISSION (HCC): Primary | Chronic | ICD-10-CM

## 2023-08-01 DIAGNOSIS — G47.09 OTHER INSOMNIA: Chronic | ICD-10-CM

## 2023-08-01 DIAGNOSIS — F90.0 ADHD, PREDOMINANTLY INATTENTIVE TYPE: Chronic | ICD-10-CM

## 2023-08-01 RX ORDER — ATOMOXETINE 80 MG/1
80 CAPSULE ORAL DAILY
Qty: 30 CAPSULE | Refills: 1 | Status: SHIPPED | OUTPATIENT
Start: 2023-08-01 | End: 2023-09-30

## 2023-08-01 RX ORDER — CARBAMAZEPINE 200 MG/1
200 TABLET ORAL 2 TIMES DAILY
Qty: 60 TABLET | Refills: 1 | Status: SHIPPED | OUTPATIENT
Start: 2023-08-01 | End: 2023-09-30

## 2023-08-01 RX ORDER — TRAZODONE HYDROCHLORIDE 50 MG/1
50-100 TABLET ORAL
Qty: 60 TABLET | Refills: 1 | Status: SHIPPED | OUTPATIENT
Start: 2023-08-01 | End: 2023-09-30

## 2023-08-01 RX ORDER — LAMOTRIGINE 200 MG/1
400 TABLET ORAL
Qty: 60 TABLET | Refills: 1 | Status: SHIPPED | OUTPATIENT
Start: 2023-08-01 | End: 2023-09-30

## 2023-08-23 ENCOUNTER — TELEPHONE (OUTPATIENT)
Dept: INTERNAL MEDICINE CLINIC | Facility: OTHER | Age: 48
End: 2023-08-23

## 2023-08-27 DIAGNOSIS — G47.09 OTHER INSOMNIA: Chronic | ICD-10-CM

## 2023-08-27 DIAGNOSIS — F90.0 ADHD, PREDOMINANTLY INATTENTIVE TYPE: Chronic | ICD-10-CM

## 2023-08-27 DIAGNOSIS — F31.75 BIPOLAR I DISORDER, MOST RECENT EPISODE DEPRESSED, IN PARTIAL REMISSION (HCC): Chronic | ICD-10-CM

## 2023-08-28 RX ORDER — ATOMOXETINE 80 MG/1
80 CAPSULE ORAL DAILY
Qty: 90 CAPSULE | Refills: 1 | OUTPATIENT
Start: 2023-08-28

## 2023-08-28 RX ORDER — CARBAMAZEPINE 200 MG/1
TABLET ORAL
Qty: 180 TABLET | Refills: 1 | OUTPATIENT
Start: 2023-08-28

## 2023-08-28 RX ORDER — TRAZODONE HYDROCHLORIDE 50 MG/1
50-100 TABLET ORAL
Qty: 180 TABLET | Refills: 1 | OUTPATIENT
Start: 2023-08-28 | End: 2023-10-27

## 2023-08-28 RX ORDER — LAMOTRIGINE 200 MG/1
400 TABLET ORAL
Qty: 180 TABLET | Refills: 1 | OUTPATIENT
Start: 2023-08-28 | End: 2023-10-27

## 2023-09-05 ENCOUNTER — TELEPHONE (OUTPATIENT)
Dept: PSYCHIATRY | Facility: CLINIC | Age: 48
End: 2023-09-05

## 2023-09-05 NOTE — TELEPHONE ENCOUNTER
Received a fax via [phone]:  Prior authorization generated via [address] by pharmacy for atomoxetine 80 mg.   Key:  [id number]

## 2023-09-07 NOTE — TELEPHONE ENCOUNTER
Completed PA request for Atomoxetine 80 mg via [employer] and sent to  [facility]

## 2023-09-11 NOTE — TELEPHONE ENCOUNTER
Please appeal that denial for the following reasons:  1. Patient has a history of Bipolar Disorder with manic episodes - stimulants are likely to induce sophia in this patient  2. Patient also has a history of substance use including Cocaine, THC and LSD. Stimulants are contraindicated due to high potential for abuse. Thank you.

## 2023-09-11 NOTE — TELEPHONE ENCOUNTER
Received fax [facility] denying atomoxetine 80 mg. The must be documention of therapeutic failure, contraindication, intolerance  to   stmilant indicated for ADHD (e.g. amphetamine, methylphenidate)      Please see denial  letter scanned into media.

## 2023-09-12 NOTE — TELEPHONE ENCOUNTER
Called [facility] clinical service prior auth appeal [id number], spoke to [facility] to file an appeal for Atomoxetine 80 mg. Renetta Willis submitted appeal and included following info from     Chris Rowland MD  to Me        9/11/23  4:07 PM  Note     Please appeal that denial for the following reasons:  1. Patient has a history of Bipolar Disorder with manic episodes - stimulants are likely to induce sophia in this patient  2. Patient also has a history of substance use including Cocaine, THC and LSD. Stimulants are contraindicated due to high potential for abuse.           Renetta Willis submitted appeal and stated estimated time for response is 9/14/23.   Appeal # [id number]

## 2023-09-14 NOTE — TELEPHONE ENCOUNTER
Received fax from [facility] approving Atomoxetine 80 mg PA 9/1/23-9/13/24    Approval letter scanned into media. Left TERRY at [facility] of [place] approval   Left Genaro STEWART of PA approval and can follow up with pharmacy for refill.

## 2023-09-21 ENCOUNTER — TELEPHONE (OUTPATIENT)
Dept: PSYCHIATRY | Facility: CLINIC | Age: 48
End: 2023-09-21

## 2023-09-21 NOTE — TELEPHONE ENCOUNTER
NO-SHOW LETTER MAILED TO Suzy Wilde.   ADDRESS: [address] [place] [address],[facility], [phone]

## 2023-10-11 DIAGNOSIS — F31.75 BIPOLAR I DISORDER, MOST RECENT EPISODE DEPRESSED, IN PARTIAL REMISSION (HCC): Primary | Chronic | ICD-10-CM

## 2023-10-11 DIAGNOSIS — G47.09 OTHER INSOMNIA: Chronic | ICD-10-CM

## 2023-10-11 DIAGNOSIS — F90.0 ADHD, PREDOMINANTLY INATTENTIVE TYPE: Chronic | ICD-10-CM

## 2023-10-11 DIAGNOSIS — F41.1 GAD (GENERALIZED ANXIETY DISORDER): Chronic | ICD-10-CM

## 2023-10-11 RX ORDER — ATOMOXETINE 80 MG/1
80 CAPSULE ORAL DAILY
Qty: 30 CAPSULE | Refills: 1 | Status: SHIPPED | OUTPATIENT
Start: 2023-10-11 | End: 2023-12-10

## 2023-10-11 RX ORDER — HYDROXYZINE 50 MG/1
50 TABLET, FILM COATED ORAL 2 TIMES DAILY
Qty: 180 TABLET | Refills: 1 | Status: CANCELLED | OUTPATIENT
Start: 2023-10-11 | End: 2024-03-09

## 2023-10-11 RX ORDER — CARBAMAZEPINE 200 MG/1
200 TABLET ORAL 2 TIMES DAILY
Qty: 60 TABLET | Refills: 1 | Status: SHIPPED | OUTPATIENT
Start: 2023-10-11 | End: 2023-12-10

## 2023-10-11 RX ORDER — HYDROXYZINE 50 MG/1
50 TABLET, FILM COATED ORAL 2 TIMES DAILY
Qty: 180 TABLET | Refills: 1 | OUTPATIENT
Start: 2023-10-11 | End: 2024-03-09

## 2023-10-11 RX ORDER — LAMOTRIGINE 200 MG/1
400 TABLET ORAL
Qty: 60 TABLET | Refills: 1 | Status: SHIPPED | OUTPATIENT
Start: 2023-10-11 | End: 2023-12-10

## 2023-10-11 RX ORDER — TRAZODONE HYDROCHLORIDE 50 MG/1
50-100 TABLET ORAL
Qty: 60 TABLET | Refills: 1 | Status: SHIPPED | OUTPATIENT
Start: 2023-10-11 | End: 2023-12-10

## 2023-10-11 RX ORDER — DULOXETIN HYDROCHLORIDE 60 MG/1
60 CAPSULE, DELAYED RELEASE ORAL DAILY
Qty: 30 CAPSULE | Refills: 1 | Status: SHIPPED | OUTPATIENT
Start: 2023-10-11 | End: 2023-12-10

## 2023-10-11 NOTE — TELEPHONE ENCOUNTER
Tegretol, Cymbalta, Strattera, Lamictal and Trazodone were escripted for 30 days with 1 RF.  Atarax was declined as it was renewed by PCP for a higher dose on 7/26/23 for 90 days with 1 RF

## 2023-10-11 NOTE — TELEPHONE ENCOUNTER
Medication Refill Request     Name of Medication STRATTERA   Dose/Frequency 80 mg/ Take 1 capsule by mouth daily. Quantity 30  Verified pharmacy   [x]  Verified ordering Provider   [x]  Does patient have enough for the next 3 days? Yes [] No [x]  Does patient have a follow-up appointment scheduled? Yes [x] No []   If so when is appointment: 11/24/2023    Medication Refill Request     Name of Medication TEGRETOL  Dose/Frequency 200 mg/ Take 1 tablet by mouth 2 times a day. Quantity 60  Verified pharmacy   [x]  Verified ordering Provider   [x]  Does patient have enough for the next 3 days? Yes [] No [x]  Does patient have a follow-up appointment scheduled? Yes [x] No []   If so when is appointment: 11/24/2023    Medication Refill Request     Name of Medication Duloxetine  Dose/Frequency 60 mg/ Take 1 capsule by mouth daily. Quantity 30  Verified pharmacy   [x]  Verified ordering Provider   [x]  Does patient have enough for the next 3 days? Yes [] No [x]  Does patient have a follow-up appointment scheduled? Yes [x] No []   If so when is appointment: 11/24/2023    Medication Refill Request     Name of Medication Hydroxyzine HCL  Dose/Frequency 50 mg/ Take 1 tablet by mouth 2 times a day. Quantity 180  Verified pharmacy   [x]  Verified ordering Provider   [x]  Does patient have enough for the next 3 days? Yes [] No [x]  Does patient have a follow-up appointment scheduled? Yes [x] No []   If so when is appointment: 11/24/2023    Medication Refill Request     Name of Medication Lamotrigine  Dose/Frequency 200 mg/ Take 2 tablets by mouth daily at bedtime. Quantity 60  Verified pharmacy   [x]  Verified ordering Provider   [x]  Does patient have enough for the next 3 days? Yes [] No [x]  Does patient have a follow-up appointment scheduled?  Yes [x] No []   If so when is appointment: 11/24/2023    Medication Refill Request     Name of Medication Trazodone  Dose/Frequency 50 mg/ take 1-2 tablets by mouth daily at bedtime as needed for sleep. Quantity 60  Verified pharmacy   [x]  Verified ordering Provider   [x]  Does patient have enough for the next 3 days? Yes [] No [x]  Does patient have a follow-up appointment scheduled?  Yes [x] No []   If so when is appointment: 11/24/2023

## 2023-10-23 ENCOUNTER — TELEPHONE (OUTPATIENT)
Dept: PSYCHIATRY | Facility: CLINIC | Age: 48
End: 2023-10-23

## 2023-10-23 NOTE — TELEPHONE ENCOUNTER
Called Pt from wait list for talk therapy. No answer, lvm for Pt to call back and also stating that we cannot assure appt availability at the time of callback.

## 2023-10-25 ENCOUNTER — TELEPHONE (OUTPATIENT)
Dept: INTERNAL MEDICINE CLINIC | Age: 48
End: 2023-10-25

## 2023-11-09 ENCOUNTER — TELEPHONE (OUTPATIENT)
Dept: PSYCHIATRY | Facility: CLINIC | Age: 48
End: 2023-11-09
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
## 2023-11-24 ENCOUNTER — OFFICE VISIT (OUTPATIENT)
Dept: PSYCHIATRY | Facility: CLINIC | Age: 48
End: 2023-11-24
Payer: COMMERCIAL

## 2023-11-24 VITALS
BODY MASS INDEX: 36.14 KG/M2 | HEIGHT: 63 IN | SYSTOLIC BLOOD PRESSURE: 115 MMHG | HEART RATE: 98 BPM | WEIGHT: 204 LBS | DIASTOLIC BLOOD PRESSURE: 77 MMHG

## 2023-11-24 DIAGNOSIS — F31.76 BIPOLAR I DISORDER, MOST RECENT EPISODE DEPRESSED, IN FULL REMISSION (HCC): Primary | Chronic | ICD-10-CM

## 2023-11-24 DIAGNOSIS — F41.1 GAD (GENERALIZED ANXIETY DISORDER): Chronic | ICD-10-CM

## 2023-11-24 DIAGNOSIS — F90.0 ADHD, PREDOMINANTLY INATTENTIVE TYPE: Chronic | ICD-10-CM

## 2023-11-24 DIAGNOSIS — Z79.899 LONG-TERM USE OF HIGH-RISK MEDICATION: Chronic | ICD-10-CM

## 2023-11-24 DIAGNOSIS — G47.09 OTHER INSOMNIA: Chronic | ICD-10-CM

## 2023-11-24 PROBLEM — D50.9 MICROCYTIC ANEMIA: Status: ACTIVE | Noted: 2023-04-20

## 2023-11-24 PROBLEM — Z90.710 S/P LAPAROSCOPIC HYSTERECTOMY: Status: ACTIVE | Noted: 2023-04-20

## 2023-11-24 PROCEDURE — 99214 OFFICE O/P EST MOD 30 MIN: CPT | Performed by: PSYCHIATRY & NEUROLOGY

## 2023-11-24 PROCEDURE — 90833 PSYTX W PT W E/M 30 MIN: CPT | Performed by: PSYCHIATRY & NEUROLOGY

## 2023-11-24 RX ORDER — CARBAMAZEPINE 200 MG/1
200 TABLET ORAL 2 TIMES DAILY
Qty: 60 TABLET | Refills: 4 | Status: SHIPPED | OUTPATIENT
Start: 2023-11-24 | End: 2024-04-22

## 2023-11-24 RX ORDER — ATOMOXETINE 80 MG/1
80 CAPSULE ORAL DAILY
Qty: 30 CAPSULE | Refills: 4 | Status: SHIPPED | OUTPATIENT
Start: 2023-11-24 | End: 2024-04-22

## 2023-11-24 RX ORDER — LAMOTRIGINE 200 MG/1
400 TABLET ORAL
Qty: 60 TABLET | Refills: 4 | Status: SHIPPED | OUTPATIENT
Start: 2023-11-24 | End: 2024-04-22

## 2023-11-24 RX ORDER — DULOXETIN HYDROCHLORIDE 60 MG/1
60 CAPSULE, DELAYED RELEASE ORAL DAILY
Qty: 30 CAPSULE | Refills: 4 | Status: SHIPPED | OUTPATIENT
Start: 2023-11-24 | End: 2024-04-22

## 2023-11-24 RX ORDER — TRAZODONE HYDROCHLORIDE 50 MG/1
50-100 TABLET ORAL
Qty: 60 TABLET | Refills: 4 | Status: SHIPPED | OUTPATIENT
Start: 2023-11-24 | End: 2024-04-22

## 2023-11-24 RX ORDER — HYDROXYZINE 50 MG/1
50 TABLET, FILM COATED ORAL 2 TIMES DAILY
Qty: 60 TABLET | Refills: 4 | Status: SHIPPED | OUTPATIENT
Start: 2023-11-24 | End: 2024-04-22

## 2023-11-24 NOTE — BH CRISIS PLAN
Client Name: Dania Saab       Client YOB: 1975  : 1975    Treatment Team (include name and contact information):     Psychiatrist: Mariana Brewer MD    Psychotherapist: None    : None      Healthcare Provider  Vera Crews MD  [address] [facility]  [phone]    Type of Plan   * Child plans (children 15 yo and younger) must be completed and signed by the child's legal guardian   * Plans for all individuals 15 yo and above must be signed by the client. Plan Type: adolescent/adult (15 and over) Initial    My Personal Strengths are (in the client's own words):  "resilience, hard working, positive attitude"    The stressors and triggers that may put me at risk are:  " Financial stress, loneliness, boredom "    Coping skills I can use to keep myself calm and safe:  " Listen to music, clean clutter, reach out to supports "    Coping skills/supports I can use to maintain abstinence from substance use:   Not Applicable    The people that provide me with help and support: (Include name, contact, and how they can help)    Support Persons #1 and #2:   *Extended Emergency Contact Information  Primary Emergency Contact: [facility]  Address:  [facility] of [address] Phone: [phone]  Relation: Spouse   *How they can help me? "He can get me focused"    Support person #3: - mother Brielle Kaur   * Phone number:  [phone]   * How can they help me?  "Conversations, advice, instruction"    In the past, the following has helped me in times of crisis:    " Talking to others, immersing myself in an alternate activity "      If it is an emergency and you need immediate help, call     If there is a possibility of danger to yourself or others, call the following crisis hotline resources:     Adult Crisis Numbers  Suicide Prevention Hotline - Dial   [facility]: [address] [facility] Street: [address]: [address]: [phone]  [address] [place] [address] Street: [phone]  [facility]: [address]  [address]: [address]: [phone] (daytime). [phone] (after hours, weekends, holidays)     Child/Adolescent Crisis Numbers   [facility]: [address]: [phone]   [facility]: [phone]   [address]: [phone]    Please note: Some [facility] do not have a separate number for Child/Adolescent specific crisis. If your county is not listed under Child/Adolescent, please call the adult number for your county     National Talk to Text Line   [facility] - [phone]    In the event your feelings become unmanageable, and you cannot reach your support system, you will call 911 immediately or go to the nearest hospital emergency room.

## 2023-11-24 NOTE — BH TREATMENT PLAN
TREATMENT PLAN (Medication Management Only)        [address] [facility]    Name/Date of Birth/MRN:  Brenda Hinton 41 y.o. 1975 MRN: [id number]  Date of Treatment Plan: November 24, 2023  Diagnosis/Diagnoses:   1. Bipolar I disorder, most recent episode depressed, in full remission ([address])    2. SANA (generalized anxiety disorder)    3. ADHD, predominantly inattentive type    4. Long-term use of high-risk medication    5. Other insomnia      Strengths/Personal Resources for Self-Care: "resilience, hard working, positive attitude". Area/Areas of need (in own words): "how to decrease ruminations". 1. Long Term Goal:   maintain control of anxiety, maintain mood stability  Target Date: 4 months - 3/24/2024  Person/Persons responsible for completion of goal: Margi  2. Short Term Objective (s) - How will we reach this goal?:   A. Provider new recommended medication/dosage changes and/or continue medication(s): continue current medications as prescribed (Cymbalta, Trazodone, Tegretol, Lamictal, Atarax, and Strattera). B.  N/A.  C.  N/A. Target Date: 4 months - 3/24/2024  Person/Persons Responsible for Completion of Goal: Dina Gu   Progress Towards Goals: stable  Treatment Modality: medication management every 4 months  Review due 180 days from date of this plan: 6 months - 5/24/2024  Expected length of service: maintenance unless revised  My Physician/PA/NP and I have developed this plan together and I agree to work on the goals and objectives. I understand the treatment goals that were developed for my treatment.   Electronic Signatures: on file (unless signed below)    Cami Ramos MD 11/24/23

## 2023-12-11 ENCOUNTER — LAB (OUTPATIENT)
Dept: LAB | Age: 48
End: 2023-12-11
Payer: COMMERCIAL

## 2023-12-11 DIAGNOSIS — F31.76 BIPOLAR I DISORDER, MOST RECENT EPISODE DEPRESSED, IN FULL REMISSION (HCC): Chronic | ICD-10-CM

## 2023-12-11 DIAGNOSIS — Z79.899 LONG-TERM USE OF HIGH-RISK MEDICATION: Chronic | ICD-10-CM

## 2023-12-11 DIAGNOSIS — F31.31 BIPOLAR I DISORDER, MOST RECENT EPISODE DEPRESSED, MILD (HCC): Chronic | ICD-10-CM

## 2023-12-11 LAB
ALBUMIN SERPL BCP-MCNC: 4.1 G/DL (ref 3.5–5)
ALP SERPL-CCNC: 68 U/L (ref 34–104)
ALT SERPL W P-5'-P-CCNC: 10 U/L (ref 7–52)
ANION GAP SERPL CALCULATED.3IONS-SCNC: 7 MMOL/L
AST SERPL W P-5'-P-CCNC: 12 U/L (ref 13–39)
BASOPHILS # BLD AUTO: 0.06 THOUSANDS/ÂΜL (ref 0–0.1)
BASOPHILS NFR BLD AUTO: 1 % (ref 0–1)
BILIRUB SERPL-MCNC: 0.22 MG/DL (ref 0.2–1)
BUN SERPL-MCNC: 14 MG/DL (ref 5–25)
CALCIUM SERPL-MCNC: 8.8 MG/DL (ref 8.4–10.2)
CARBAMAZEPINE SERPL-MCNC: 6.2 UG/ML (ref 4–12)
CHLORIDE SERPL-SCNC: 104 MMOL/L (ref 96–108)
CO2 SERPL-SCNC: 25 MMOL/L (ref 21–32)
CREAT SERPL-MCNC: 0.91 MG/DL (ref 0.6–1.3)
EOSINOPHIL # BLD AUTO: 0.12 THOUSAND/ÂΜL (ref 0–0.61)
EOSINOPHIL NFR BLD AUTO: 2 % (ref 0–6)
ERYTHROCYTE [DISTWIDTH] IN BLOOD BY AUTOMATED COUNT: 16.1 % (ref 11.6–15.1)
GFR SERPL CREATININE-BSD FRML MDRD: 76 ML/MIN/1.73SQ M
GLUCOSE SERPL-MCNC: 109 MG/DL (ref 65–140)
HCT VFR BLD AUTO: 32.8 % (ref 34.8–46.1)
HGB BLD-MCNC: 9.9 G/DL (ref 11.5–15.4)
IMM GRANULOCYTES # BLD AUTO: 0.02 THOUSAND/UL (ref 0–0.2)
IMM GRANULOCYTES NFR BLD AUTO: 0 % (ref 0–2)
LYMPHOCYTES # BLD AUTO: 2.23 THOUSANDS/ÂΜL (ref 0.6–4.47)
LYMPHOCYTES NFR BLD AUTO: 30 % (ref 14–44)
MCH RBC QN AUTO: 23.7 PG (ref 26.8–34.3)
MCHC RBC AUTO-ENTMCNC: 30.2 G/DL (ref 31.4–37.4)
MCV RBC AUTO: 79 FL (ref 82–98)
MONOCYTES # BLD AUTO: 0.52 THOUSAND/ÂΜL (ref 0.17–1.22)
MONOCYTES NFR BLD AUTO: 7 % (ref 4–12)
NEUTROPHILS # BLD AUTO: 4.57 THOUSANDS/ÂΜL (ref 1.85–7.62)
NEUTS SEG NFR BLD AUTO: 60 % (ref 43–75)
NRBC BLD AUTO-RTO: 0 /100 WBCS
PLATELET # BLD AUTO: 393 THOUSANDS/UL (ref 149–390)
PMV BLD AUTO: 10 FL (ref 8.9–12.7)
POTASSIUM SERPL-SCNC: 4.5 MMOL/L (ref 3.5–5.3)
PROT SERPL-MCNC: 6.9 G/DL (ref 6.4–8.4)
RBC # BLD AUTO: 4.17 MILLION/UL (ref 3.81–5.12)
SODIUM SERPL-SCNC: 136 MMOL/L (ref 135–147)
WBC # BLD AUTO: 7.52 THOUSAND/UL (ref 4.31–10.16)

## 2023-12-11 PROCEDURE — 80156 ASSAY CARBAMAZEPINE TOTAL: CPT

## 2023-12-11 PROCEDURE — 36415 COLL VENOUS BLD VENIPUNCTURE: CPT

## 2023-12-11 PROCEDURE — 80053 COMPREHEN METABOLIC PANEL: CPT

## 2023-12-11 PROCEDURE — 85025 COMPLETE CBC W/AUTO DIFF WBC: CPT

## 2023-12-12 NOTE — RESULT ENCOUNTER NOTE
Letter was sent to [facility] to follow up with PCP for low Hemoglobin and Hematocrit. CMP is normal, Tegretol level is therapeutic at 6. 2

## 2023-12-18 DIAGNOSIS — G47.09 OTHER INSOMNIA: Chronic | ICD-10-CM

## 2023-12-18 DIAGNOSIS — F31.76 BIPOLAR I DISORDER, MOST RECENT EPISODE DEPRESSED, IN FULL REMISSION (HCC): Chronic | ICD-10-CM

## 2023-12-19 RX ORDER — TRAZODONE HYDROCHLORIDE 50 MG/1
50-100 TABLET ORAL
Qty: 180 TABLET | Refills: 2 | OUTPATIENT
Start: 2023-12-19 | End: 2024-05-17

## 2023-12-19 RX ORDER — LAMOTRIGINE 200 MG/1
400 TABLET ORAL
Qty: 180 TABLET | Refills: 2 | OUTPATIENT
Start: 2023-12-19 | End: 2024-05-17

## 2024-02-27 ENCOUNTER — OFFICE VISIT (OUTPATIENT)
Dept: INTERNAL MEDICINE CLINIC | Facility: OTHER | Age: 49
End: 2024-02-27
Payer: COMMERCIAL

## 2024-02-27 VITALS
RESPIRATION RATE: 18 BRPM | HEART RATE: 71 BPM | DIASTOLIC BLOOD PRESSURE: 86 MMHG | WEIGHT: 207.8 LBS | HEIGHT: 63 IN | OXYGEN SATURATION: 97 % | TEMPERATURE: 98.6 F | BODY MASS INDEX: 36.82 KG/M2 | SYSTOLIC BLOOD PRESSURE: 136 MMHG

## 2024-02-27 DIAGNOSIS — E66.01 MORBID OBESITY (HCC): ICD-10-CM

## 2024-02-27 DIAGNOSIS — D50.9 MICROCYTIC ANEMIA: Primary | ICD-10-CM

## 2024-02-27 DIAGNOSIS — G47.09 OTHER INSOMNIA: Chronic | ICD-10-CM

## 2024-02-27 DIAGNOSIS — F19.939 WITHDRAWAL FROM OTHER PSYCHOACTIVE SUBSTANCE (HCC): ICD-10-CM

## 2024-02-27 DIAGNOSIS — E55.9 VITAMIN D DEFICIENCY: ICD-10-CM

## 2024-02-27 DIAGNOSIS — F31.76 BIPOLAR I DISORDER, MOST RECENT EPISODE DEPRESSED, IN FULL REMISSION (HCC): ICD-10-CM

## 2024-02-27 DIAGNOSIS — K44.9 HIATAL HERNIA: ICD-10-CM

## 2024-02-27 DIAGNOSIS — Z13.6 ENCOUNTER FOR LIPID SCREENING FOR CARDIOVASCULAR DISEASE: ICD-10-CM

## 2024-02-27 DIAGNOSIS — K21.9 GASTROESOPHAGEAL REFLUX DISEASE WITHOUT ESOPHAGITIS: ICD-10-CM

## 2024-02-27 DIAGNOSIS — Z13.220 ENCOUNTER FOR LIPID SCREENING FOR CARDIOVASCULAR DISEASE: ICD-10-CM

## 2024-02-27 DIAGNOSIS — Z12.11 SCREENING FOR COLON CANCER: ICD-10-CM

## 2024-02-27 PROBLEM — N93.9 ABNORMAL UTERINE BLEEDING (AUB): Status: RESOLVED | Noted: 2023-02-10 | Resolved: 2024-02-27

## 2024-02-27 PROBLEM — Z79.899 LONG-TERM USE OF HIGH-RISK MEDICATION: Chronic | Status: RESOLVED | Noted: 2018-10-05 | Resolved: 2024-02-27

## 2024-02-27 PROBLEM — Z01.818 PREOP EXAM FOR INTERNAL MEDICINE: Status: RESOLVED | Noted: 2023-03-21 | Resolved: 2024-02-27

## 2024-02-27 PROCEDURE — 99214 OFFICE O/P EST MOD 30 MIN: CPT | Performed by: INTERNAL MEDICINE

## 2024-02-27 RX ORDER — OMEPRAZOLE 40 MG/1
40 CAPSULE, DELAYED RELEASE ORAL DAILY
Qty: 90 CAPSULE | Refills: 1 | Status: SHIPPED | OUTPATIENT
Start: 2024-02-27

## 2024-02-27 RX ORDER — UBROGEPANT 100 MG/1
100 TABLET ORAL AS NEEDED
COMMUNITY

## 2024-02-27 NOTE — PROGRESS NOTES
Assessment/Plan:    Gastroesophageal reflux disease without esophagitis  Continue PPi therapy.     Vitamin D deficiency  Discussed vitamin supplementation.     Other insomnia  Stable, continue trazodone. Management as per psychiatry.     Morbid obesity (HCC)  Discussed diet and exercise.     Microcytic anemia  Will refer to hematology. Will check iron panel and CBC.         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)    Bipolar I disorder, most recent episode depressed, in full remission ([facility])    Gastroesophageal reflux disease without esophagitis  -     omeprazole (PriLOSEC) 40 MG capsule; Take 1 capsule (40 mg total) by mouth daily    Vitamin D deficiency  -     Vitamin D 25 hydroxy; Future    Other insomnia    Morbid obesity (HCC)    Encounter for lipid screening for cardiovascular disease  -     Lipid panel; Future    Hiatal hernia  -     omeprazole (PriLOSEC) 40 MG capsule; Take 1 capsule (40 mg total) by mouth daily    Other orders  -     Ubrelvy 100 MG tablet; Take 100 mg by mouth if needed            Tobacco Cessation Counseling: Tobacco cessation counseling was provided. The patient is sincerely urged to quit consumption of tobacco. She is not ready to quit tobacco. Medication options and side effects of medication discussed. Patient refused medication.            Subjective:      Patient ID: Margi Willson is a 48 y.o. female.    Chief Complaint   Patient presents with   • labs     abnormal labs done on 12/11/23 and would like to discuss the next step for her. Ordered by psych    •      Pap, cologuard, mammogram ordered 4/26/23 annual       Margi Willson is seen today for follow up of chronic conditions.   Recent  laboratory studies reviewed today with the patient. Hb: 9.9, MCV: 79.   she has been compliant with her medication regimen.   She follows up with her psychiatrist regularly.   Since the weekend she has been experiencing generalized weakness.   she has no complaints or concerns at this time.           The following portions of the patient's history were reviewed and updated as appropriate: allergies, current medications, past family history, past medical history, past social history, past surgical history, and problem list.    Review of Systems   Constitutional:  Negative for activity change, appetite change, chills, diaphoresis, fatigue and fever.   HENT:  Negative for congestion, postnasal drip, rhinorrhea, sinus pressure, sinus pain, sneezing and sore throat.    Eyes:  Negative for visual disturbance.   Respiratory:  Negative for apnea, cough, choking, chest tightness, shortness of breath and wheezing.    Cardiovascular:  Negative for chest pain, palpitations and leg swelling.   Gastrointestinal:  Negative for abdominal distention, abdominal pain, anal bleeding, blood in stool, constipation, diarrhea, nausea and vomiting.   Endocrine: Negative for cold intolerance and heat intolerance.   Genitourinary:  Negative for difficulty urinating, dysuria and hematuria.   Musculoskeletal: Negative.    Skin: Negative.    Neurological:  Negative for dizziness, weakness, light-headedness, numbness and headaches.   Hematological:  Negative for adenopathy.   Psychiatric/Behavioral:  Negative for agitation, sleep disturbance and suicidal ideas.    All other systems reviewed and are negative.        Past Medical History:   Diagnosis Date   • Abnormal uterine bleeding (AUB)     Formatting of this note might be different from the original.  Prolonged h/o heavy painful menses. Tried Mirena IUD but with minimal improvement.  Pelvic US w/ suspected adenomyosis.   • Acute pancreatitis     Last Assessed: 12Apr2016; due to gallstones  (removed)   • Anemia    • Anxiety    • Arthralgia    • Concussion     Last Assessed: 2012   • Endometriosis    • Esophageal reflux    • Familial combined hyperlipidemia     Last Assessed: 2013   • Gastropathy    • Head injury    • History of sinusitis    • Irregular heart beat    • Migraine     Last Assessed: 2012   • Obstructive sleep apnea treated with continuous positive airway pressure (CPAP) 10/25/2021   • Paroxysmal supraventricular tachycardia    • Peptic ulcer disease    • Post concussion syndrome 10/26/2021   • Spontaneous     • Substance abuse (HCC)    • Suicide attempt (HCC)    • UTI (urinary tract infection)    • Vitamin D deficiency    • 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 psychoactive substance (HCC) 2024         Current Outpatient Medications:   •  atomoxetine (STRATTERA) 80 MG capsule, Take 1 capsule (80 mg total) by mouth daily, Disp: 30 capsule, Rfl: 4  •  carBAMazepine (TEGretol) 200 mg tablet, Take 1 tablet (200 mg total) by mouth 2 (two) times a day, Disp: 60 tablet, Rfl: 4  •  DULoxetine (CYMBALTA) 60 mg delayed release capsule, Take 1 capsule (60 mg total) by mouth daily, Disp: 30 capsule, Rfl: 4  •  hydrOXYzine HCL (ATARAX) 50 mg tablet, Take 1 tablet (50 mg total) by mouth 2 (two) times a day, Disp: 60 tablet, Rfl: 4  •  lamoTRIgine (LaMICtal) 200 MG tablet, Take 2 tablets (400 mg total) by mouth daily at bedtime, Disp: 60 tablet, Rfl: 4  •  omeprazole (PriLOSEC) 40 MG capsule, Take 1 capsule (40 mg total) by mouth daily, Disp: 90 capsule, Rfl: 1  •  traZODone (DESYREL) 50 mg tablet, Take 1-2 tablets ( mg total) by mouth daily at bedtime as needed for sleep (Patient taking differently: Take 100 mg by mouth daily), Disp: 60 tablet, Rfl: 4  •  Ubrelvy 100 MG tablet, Take 100 mg by mouth if needed, Disp: , Rfl:     Allergies   Allergen Reactions   • Geodon [Ziprasidone]      Nystagmus   • Quetiapine      Other reaction(s): Other (See Comments)  Per pt low blood pressure and  "fainting   • Iron Other (See Comments)     Sensitivity to the iron they gave her at her infusion. Itchy scratchy throat and warm   • Other      Seasonal allergies       Social History   Past Surgical History:   Procedure Laterality Date   • BARIATRIC SURGERY  2020    sleeve   •  SECTION     • FOREARM SURGERY Left    • INDUCED       x3   • LAPAROSCOPIC CHOLECYSTECTOMY      Last Assessed: 2016   • LAPAROSCOPIC TOTAL HYSTERECTOMY     • LAPAROSCOPY      Exploratory   • SLEEVE GASTROPLASTY     • TONSILLECTOMY      Onset: [id number]     Family History   Problem Relation Age of Onset   • Hyperthyroidism Mother    • Thyroid disease Mother    • Depression Father    • Hypertension Father    • Obesity Father    • Other Paternal Grandmother         Cardiac Disorder   • Dementia Paternal Grandmother    • Hypertension Paternal Grandmother    • Diabetes Paternal Grandmother    • Hypertension Paternal Grandfather    • Other Paternal Grandfather         Cardiac Disorder   • Diabetes Paternal Grandfather    • Schizophrenia Other    • Schizophrenia Other    • Suicidality Other    • Completed Suicide  Other    • Breast cancer Other    • Hypertension Paternal Aunt    • No Known Problems Maternal Grandmother    • No Known Problems Maternal Grandfather    • No Known Problems Son    • No Known Problems Son    • No Known Problems Brother    • No Known Problems Maternal Aunt    • Stroke Neg Hx    • Drug abuse Neg Hx        Objective:  /86 (BP Location: Left arm, Patient Position: Sitting, Cuff Size: Standard)   Pulse 71   Temp 98.6 °F (37 °C) (Temporal)   Resp 18   Ht 5' 3\" (1.6 m)   Wt 94.3 kg (207 lb 12.8 oz)   SpO2 97%   BMI 36.81 kg/m²     No results found for this or any previous visit (from the past 1344 hour(s)).         Physical Exam  Vitals and nursing note reviewed.   Constitutional:       General: She is not in acute distress.     Appearance: She is well-developed. She is not diaphoretic. "   HENT:      Head: Normocephalic and atraumatic.   Eyes:      General:         Right eye: No discharge.         Left eye: No discharge.      Conjunctiva/sclera: Conjunctivae normal.      Pupils: Pupils are equal, round, and reactive to light.   Neck:      Thyroid: No thyromegaly.      Vascular: No JVD.   Cardiovascular:      Rate and Rhythm: Normal rate and regular rhythm.      Heart sounds: Normal heart sounds. No murmur heard.     No friction rub. No gallop.   Pulmonary:      Effort: Pulmonary effort is normal. No respiratory distress.      Breath sounds: Normal breath sounds. No wheezing or rales.   Chest:      Chest wall: No tenderness.   Abdominal:      General: There is no distension.      Palpations: Abdomen is soft.      Tenderness: There is no abdominal tenderness.   Musculoskeletal:         General: No tenderness or deformity. Normal range of motion.      Cervical back: Normal range of motion and neck supple.   Lymphadenopathy:      Cervical: No cervical adenopathy.   Skin:     General: Skin is warm and dry.      Coloration: Skin is not pale.      Findings: No erythema or rash.   Neurological:      Mental Status: She is alert and oriented to person, place, and time.      Cranial Nerves: No cranial nerve deficit.      Coordination: Coordination normal.   Psychiatric:         Behavior: Behavior normal.         Thought Content: Thought content normal.         Judgment: Judgment normal.          Yes

## 2024-02-28 ENCOUNTER — TELEPHONE (OUTPATIENT)
Dept: HEMATOLOGY ONCOLOGY | Facility: CLINIC | Age: 49
End: 2024-02-28

## 2024-02-28 NOTE — TELEPHONE ENCOUNTER
I called Margi in response to a referral that was received for patient to establish care with Hematology.     Outreach was made to schedule a consultation.    I left a voicemail explaining the reason for my call and advised patient to call [facility] at [phone].  Another attempt will be made to contact patient.

## 2024-05-02 ENCOUNTER — TELEPHONE (OUTPATIENT)
Dept: PSYCHIATRY | Facility: CLINIC | Age: 49
End: 2024-05-02

## 2024-05-02 DIAGNOSIS — F31.76 BIPOLAR I DISORDER, MOST RECENT EPISODE DEPRESSED, IN FULL REMISSION (HCC): Chronic | ICD-10-CM

## 2024-05-02 DIAGNOSIS — F41.1 GAD (GENERALIZED ANXIETY DISORDER): Chronic | ICD-10-CM

## 2024-05-02 DIAGNOSIS — F90.0 ADHD, PREDOMINANTLY INATTENTIVE TYPE: Chronic | ICD-10-CM

## 2024-05-02 DIAGNOSIS — G47.09 OTHER INSOMNIA: Chronic | ICD-10-CM

## 2024-05-02 RX ORDER — HYDROXYZINE 50 MG/1
50 TABLET, FILM COATED ORAL 2 TIMES DAILY
Qty: 60 TABLET | Refills: 4 | OUTPATIENT
Start: 2024-05-02

## 2024-05-02 RX ORDER — LAMOTRIGINE 200 MG/1
400 TABLET ORAL
Qty: 60 TABLET | Refills: 4 | OUTPATIENT
Start: 2024-05-02 | End: 2024-09-29

## 2024-05-02 RX ORDER — TRAZODONE HYDROCHLORIDE 50 MG/1
50-100 TABLET ORAL
Qty: 60 TABLET | Refills: 4 | OUTPATIENT
Start: 2024-05-02 | End: 2024-09-29

## 2024-05-02 RX ORDER — ATOMOXETINE 80 MG/1
80 CAPSULE ORAL DAILY
Qty: 30 CAPSULE | Refills: 4 | OUTPATIENT
Start: 2024-05-02

## 2024-05-02 NOTE — TELEPHONE ENCOUNTER
Called and left message for patient to return a call to [phone] and schedule follow up with provider. Please schedule upon return call. Thank you.

## 2024-05-07 ENCOUNTER — TELEPHONE (OUTPATIENT)
Dept: PSYCHIATRY | Facility: CLINIC | Age: 49
End: 2024-05-07

## 2024-05-07 DIAGNOSIS — G47.09 OTHER INSOMNIA: Chronic | ICD-10-CM

## 2024-05-07 DIAGNOSIS — F31.76 BIPOLAR I DISORDER, MOST RECENT EPISODE DEPRESSED, IN FULL REMISSION (HCC): Primary | Chronic | ICD-10-CM

## 2024-05-07 DIAGNOSIS — F41.1 GAD (GENERALIZED ANXIETY DISORDER): Chronic | ICD-10-CM

## 2024-05-07 RX ORDER — TRAZODONE HYDROCHLORIDE 50 MG/1
50-100 TABLET ORAL
Qty: 60 TABLET | Refills: 4 | Status: SHIPPED | OUTPATIENT
Start: 2024-05-07 | End: 2024-10-04

## 2024-05-07 RX ORDER — HYDROXYZINE 50 MG/1
50 TABLET, FILM COATED ORAL 2 TIMES DAILY
Qty: 60 TABLET | Refills: 4 | Status: SHIPPED | OUTPATIENT
Start: 2024-05-07 | End: 2024-10-04

## 2024-05-07 RX ORDER — LAMOTRIGINE 200 MG/1
400 TABLET ORAL
Qty: 60 TABLET | Refills: 4 | Status: SHIPPED | OUTPATIENT
Start: 2024-05-07 | End: 2024-10-04

## 2024-05-07 NOTE — TELEPHONE ENCOUNTER
Patient contacted the office to schedule a follow up visit with provider. Patient is now scheduled for 10/1  at 3:30 in office.

## 2024-05-07 NOTE — TELEPHONE ENCOUNTER
Medication Refill Request     Name of Medication ATARAX  Dose/Frequency 50 MG/ TAKE 1 TABLET 2X A DAY  Quantity 60  Verified pharmacy   [x]  Verified ordering Provider   [x]  Does patient have enough for the next 3 days? Yes [] No [x]  Does patient have a follow-up appointment scheduled? Yes [x] No []   If so when is appointment: 10/1  3:30    Medication Refill Request     Name of Medication LaMICtal  Dose/Frequency 200mg/ take 2 tablets at bedtime  Quantity 60  Verified pharmacy   [x]  Verified ordering Provider   [x]  Does patient have enough for the next 3 days? Yes [] No [x]  Does patient have a follow-up appointment scheduled? Yes [x] No []   If so when is appointment: 10/1 @3:30  Medication Refill Request     Name of Medication DESYRELL  Dose/Frequency 50mg/ take 1-2 tablets daily at bedtime  Quantity 60  Verified pharmacy   [x]  Verified ordering Provider   [x]  Does patient have enough for the next 3 days? Yes [] No [x]  Does patient have a follow-up appointment scheduled? Yes [x] No []   If so when is appointment: 10/1 @ 3:30

## 2024-05-11 ENCOUNTER — APPOINTMENT (EMERGENCY)
Dept: CT IMAGING | Facility: HOSPITAL | Age: 49
End: 2024-05-11
Payer: COMMERCIAL

## 2024-05-11 ENCOUNTER — HOSPITAL ENCOUNTER (EMERGENCY)
Facility: HOSPITAL | Age: 49
Discharge: HOME | End: 2024-05-11
Attending: EMERGENCY MEDICINE
Payer: COMMERCIAL

## 2024-05-11 VITALS
WEIGHT: 207 LBS | SYSTOLIC BLOOD PRESSURE: 134 MMHG | OXYGEN SATURATION: 99 % | BODY MASS INDEX: 36.67 KG/M2 | TEMPERATURE: 98 F | HEART RATE: 67 BPM | DIASTOLIC BLOOD PRESSURE: 77 MMHG | RESPIRATION RATE: 18 BRPM

## 2024-05-11 DIAGNOSIS — R10.9 LEFT FLANK PAIN: Primary | ICD-10-CM

## 2024-05-11 LAB
ALBUMIN SERPL BCP-MCNC: 4.3 G/DL (ref 3.5–5)
ALP SERPL-CCNC: 80 U/L (ref 34–104)
ALT SERPL W P-5'-P-CCNC: 16 U/L (ref 7–52)
ANION GAP SERPL CALCULATED.3IONS-SCNC: 7 MMOL/L (ref 4–13)
AST SERPL W P-5'-P-CCNC: 11 U/L (ref 13–39)
BASOPHILS # BLD AUTO: 0.05 THOUSANDS/ÂΜL (ref 0–0.1)
BASOPHILS NFR BLD AUTO: 1 % (ref 0–1)
BILIRUB SERPL-MCNC: 0.25 MG/DL (ref 0.2–1)
BILIRUB UR QL STRIP: NEGATIVE
BUN SERPL-MCNC: 13 MG/DL (ref 5–25)
CALCIUM SERPL-MCNC: 9.5 MG/DL (ref 8.4–10.2)
CHLORIDE SERPL-SCNC: 104 MMOL/L (ref 96–108)
CLARITY UR: CLEAR
CO2 SERPL-SCNC: 27 MMOL/L (ref 21–32)
COLOR UR: YELLOW
CREAT SERPL-MCNC: 0.9 MG/DL (ref 0.6–1.3)
EOSINOPHIL # BLD AUTO: 0.27 THOUSAND/ÂΜL (ref 0–0.61)
EOSINOPHIL NFR BLD AUTO: 3 % (ref 0–6)
ERYTHROCYTE [DISTWIDTH] IN BLOOD BY AUTOMATED COUNT: 15.6 % (ref 11.6–15.1)
GFR SERPL CREATININE-BSD FRML MDRD: 76 ML/MIN/1.73SQ M
GLUCOSE SERPL-MCNC: 69 MG/DL (ref 65–140)
GLUCOSE UR STRIP-MCNC: NEGATIVE MG/DL
HCT VFR BLD AUTO: 34.5 % (ref 34.8–46.1)
HGB BLD-MCNC: 10.5 G/DL (ref 11.5–15.4)
HGB UR QL STRIP.AUTO: NEGATIVE
IMM GRANULOCYTES # BLD AUTO: 0.03 THOUSAND/UL (ref 0–0.2)
IMM GRANULOCYTES NFR BLD AUTO: 0 % (ref 0–2)
KETONES UR STRIP-MCNC: NEGATIVE MG/DL
LEUKOCYTE ESTERASE UR QL STRIP: NEGATIVE
LYMPHOCYTES # BLD AUTO: 3.04 THOUSANDS/ÂΜL (ref 0.6–4.47)
LYMPHOCYTES NFR BLD AUTO: 30 % (ref 14–44)
MCH RBC QN AUTO: 24.4 PG (ref 26.8–34.3)
MCHC RBC AUTO-ENTMCNC: 30.4 G/DL (ref 31.4–37.4)
MCV RBC AUTO: 80 FL (ref 82–98)
MONOCYTES # BLD AUTO: 0.92 THOUSAND/ÂΜL (ref 0.17–1.22)
MONOCYTES NFR BLD AUTO: 9 % (ref 4–12)
NEUTROPHILS # BLD AUTO: 5.71 THOUSANDS/ÂΜL (ref 1.85–7.62)
NEUTS SEG NFR BLD AUTO: 57 % (ref 43–75)
NITRITE UR QL STRIP: NEGATIVE
NRBC BLD AUTO-RTO: 0 /100 WBCS
PH UR STRIP.AUTO: 6.5 [PH]
PLATELET # BLD AUTO: 413 THOUSANDS/UL (ref 149–390)
PMV BLD AUTO: 9.4 FL (ref 8.9–12.7)
POTASSIUM SERPL-SCNC: 4.1 MMOL/L (ref 3.5–5.3)
PROT SERPL-MCNC: 7.7 G/DL (ref 6.4–8.4)
PROT UR STRIP-MCNC: NEGATIVE MG/DL
RBC # BLD AUTO: 4.31 MILLION/UL (ref 3.81–5.12)
SODIUM SERPL-SCNC: 138 MMOL/L (ref 135–147)
SP GR UR STRIP.AUTO: <=1.005
UROBILINOGEN UR QL STRIP.AUTO: 0.2 E.U./DL
WBC # BLD AUTO: 10.02 THOUSAND/UL (ref 4.31–10.16)

## 2024-05-11 PROCEDURE — 96376 TX/PRO/DX INJ SAME DRUG ADON: CPT

## 2024-05-11 PROCEDURE — 81003 URINALYSIS AUTO W/O SCOPE: CPT | Performed by: PHYSICIAN ASSISTANT

## 2024-05-11 PROCEDURE — 99284 EMERGENCY DEPT VISIT MOD MDM: CPT

## 2024-05-11 PROCEDURE — 74176 CT ABD & PELVIS W/O CONTRAST: CPT

## 2024-05-11 PROCEDURE — 85025 COMPLETE CBC W/AUTO DIFF WBC: CPT | Performed by: PHYSICIAN ASSISTANT

## 2024-05-11 PROCEDURE — 96375 TX/PRO/DX INJ NEW DRUG ADDON: CPT

## 2024-05-11 PROCEDURE — 36415 COLL VENOUS BLD VENIPUNCTURE: CPT | Performed by: PHYSICIAN ASSISTANT

## 2024-05-11 PROCEDURE — 96374 THER/PROPH/DIAG INJ IV PUSH: CPT

## 2024-05-11 PROCEDURE — 96361 HYDRATE IV INFUSION ADD-ON: CPT

## 2024-05-11 PROCEDURE — 80053 COMPREHEN METABOLIC PANEL: CPT | Performed by: PHYSICIAN ASSISTANT

## 2024-05-11 PROCEDURE — 99285 EMERGENCY DEPT VISIT HI MDM: CPT | Performed by: PHYSICIAN ASSISTANT

## 2024-05-11 RX ORDER — ONDANSETRON 2 MG/ML
4 INJECTION INTRAMUSCULAR; INTRAVENOUS ONCE
Status: COMPLETED | OUTPATIENT
Start: 2024-05-11 | End: 2024-05-11

## 2024-05-11 RX ORDER — KETOROLAC TROMETHAMINE 30 MG/ML
15 INJECTION, SOLUTION INTRAMUSCULAR; INTRAVENOUS ONCE
Status: COMPLETED | OUTPATIENT
Start: 2024-05-11 | End: 2024-05-11

## 2024-05-11 RX ADMIN — KETOROLAC TROMETHAMINE 15 MG: 30 INJECTION, SOLUTION INTRAMUSCULAR; INTRAVENOUS at 15:12

## 2024-05-11 RX ADMIN — ONDANSETRON 4 MG: 2 INJECTION INTRAMUSCULAR; INTRAVENOUS at 15:12

## 2024-05-11 RX ADMIN — KETOROLAC TROMETHAMINE 15 MG: 30 INJECTION, SOLUTION INTRAMUSCULAR; INTRAVENOUS at 15:58

## 2024-05-11 RX ADMIN — SODIUM CHLORIDE 1000 ML: 0.9 INJECTION, SOLUTION INTRAVENOUS at 15:15

## 2024-05-11 NOTE — ED NOTES
Pt is unable to provide urine sample at this time. Given urine strainer and explained how to collect and ring if need assistance     Audelia Mattson RN  05/11/24 [id number]

## 2024-05-11 NOTE — ED PROVIDER NOTES
History  Chief Complaint   Patient presents with    Possible UTI    Flank Pain     Pt s/p gastric sleeve with hx bipolar 1, no medical issues reports last night she urinated about 4 drops and had excruiating pain to left flank radiating to bladder. Which is constant 6-7/10, worse upon urination. Reports slight hematuria earlier today. Foul smelling urine. Yesterday had chills and sweats. S/p hysterectomy. 1 episode vomiting. Pt in recovery, no hx opioid abuse, doesn't want anything strong.       Flank Pain  Associated symptoms: hematuria    Associated symptoms: no chest pain, no fever and no shortness of breath        Prior to Admission Medications   Prescriptions Last Dose Informant Patient Reported? Taking?   DULoxetine (CYMBALTA) 60 mg delayed release capsule  Self No No   Sig: Take 1 capsule (60 mg total) by mouth daily   Ubrelvy 100 MG tablet  Self Yes No   Sig: Take 100 mg by mouth if needed   atomoxetine (STRATTERA) 80 MG capsule  Self No No   Sig: Take 1 capsule (80 mg total) by mouth daily   carBAMazepine (TEGretol) 200 mg tablet  Self No No   Sig: Take 1 tablet (200 mg total) by mouth 2 (two) times a day   hydrOXYzine HCL (ATARAX) 50 mg tablet   No No   Sig: Take 1 tablet (50 mg total) by mouth 2 (two) times a day   lamoTRIgine (LaMICtal) 200 MG tablet   No No   Sig: Take 2 tablets (400 mg total) by mouth daily at bedtime   omeprazole (PriLOSEC) 40 MG capsule   No No   Sig: Take 1 capsule (40 mg total) by mouth daily   traZODone (DESYREL) 50 mg tablet   No No   Sig: Take 1-2 tablets ( mg total) by mouth daily at bedtime as needed for sleep      Facility-Administered Medications: None       Past Medical History:   Diagnosis Date    Abnormal uterine bleeding (AUB)     Formatting of this note might be different from the original.  Prolonged h/o heavy painful menses. Tried Mirena IUD but with minimal improvement.  Pelvic US w/ suspected adenomyosis.    Acute pancreatitis     Last Assessed: 12Apr2016; due  to gallstones (removed)    Anemia     Anxiety     Arthralgia     Concussion     Last Assessed: 2012    Endometriosis     Esophageal reflux     Familial combined hyperlipidemia     Last Assessed: [id number]    Gastropathy     Head injury     History of sinusitis     Irregular heart beat     Migraine     Last Assessed: 2012    Obstructive sleep apnea treated with continuous positive airway pressure (CPAP) 10/25/2021    Paroxysmal supraventricular tachycardia     Peptic ulcer disease     Post concussion syndrome 10/26/2021    Spontaneous      Substance abuse (HCC)     Suicide attempt (HCC)     UTI (urinary tract infection)     Vitamin D deficiency     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 psychoactive substance (HCC) 2024       Past Surgical History:   Procedure Laterality Date    BARIATRIC SURGERY  2020    sleeve     SECTION      FOREARM SURGERY Left     INDUCED       x3    LAPAROSCOPIC CHOLECYSTECTOMY      Last Assessed: 2016    LAPAROSCOPIC TOTAL HYSTERECTOMY      LAPAROSCOPY      Exploratory    SLEEVE GASTROPLASTY      TONSILLECTOMY      Onset: [id number]       Family History   Problem Relation Age of Onset    Hyperthyroidism Mother     Thyroid disease Mother     Depression Father     Hypertension Father     Obesity Father     Other Paternal Grandmother         Cardiac Disorder    Dementia Paternal Grandmother     Hypertension Paternal Grandmother     Diabetes Paternal Grandmother     Hypertension Paternal Grandfather     Other Paternal Grandfather         Cardiac Disorder    Diabetes Paternal Grandfather     Schizophrenia Other     Schizophrenia Other     Suicidality Other     Completed Suicide  Other     Breast cancer Other     Hypertension Paternal Aunt     No Known Problems Maternal Grandmother     No Known Problems Maternal Grandfather     No Known Problems Son     No Known Problems Son     No Known Problems Brother     No Known Problems Maternal Aunt     Stroke Neg Hx     Drug  abuse Neg Hx      I have reviewed and agree with the history as documented.    E-Cigarette/Vaping    E-Cigarette Use Never User      E-Cigarette/Vaping Substances    Nicotine No     THC No     CBD No     Flavoring No     Other No     Unknown No      Social History     Tobacco Use    Smoking status: Every Day     Average packs/day: 0.3 packs/day for 0.2 years     Types: Cigarettes     Start date: 2019    Smokeless tobacco: Never    Tobacco comments:     About 3 cigs a day   Vaping Use    Vaping status: Never Used   Substance Use Topics    Alcohol use: Not Currently     Comment: social as og 2024 when asked    Drug use: No     Comment: Past cocaine and marijuana use for 2 years until 2001. Also tried LSD years ago. No current recreational drug use       Review of Systems   Constitutional:  Negative for fever.   HENT:  Negative for nosebleeds.    Eyes:  Negative for redness.   Respiratory:  Negative for shortness of breath.    Cardiovascular:  Negative for chest pain.   Gastrointestinal:  Negative for blood in stool.   Genitourinary:  Positive for decreased urine volume, flank pain and hematuria.   Musculoskeletal:  Negative for gait problem.   Skin:  Negative for rash.   Neurological:  Negative for seizures.   Psychiatric/Behavioral:  Negative for behavioral problems.        Physical Exam  Physical Exam  Vitals and nursing note reviewed.   Constitutional:       General: She is in acute distress.      Appearance: Normal appearance. She is obese. She is not ill-appearing or toxic-appearing.   HENT:      Head: Normocephalic and atraumatic.   Eyes:      Extraocular Movements: Extraocular movements intact.      Comments: Glasses.  Xanthelasma   Cardiovascular:      Rate and Rhythm: Normal rate and regular rhythm.   Pulmonary:      Effort: Pulmonary effort is normal.      Breath sounds: Normal breath sounds.   Abdominal:      General: Abdomen is flat. Bowel sounds are normal.      Palpations: Abdomen is soft.       Tenderness: There is no abdominal tenderness. There is left CVA tenderness. There is no right CVA tenderness.   Musculoskeletal:         General: Normal range of motion.      Cervical back: Normal range of motion.   Skin:     General: Skin is warm and dry.      Findings: No rash (No rash overlying the area to suggest shingles).   Neurological:      General: No focal deficit present.      Mental Status: She is alert.   Psychiatric:         Mood and Affect: Mood normal.         Behavior: Behavior normal.         Vital Signs  ED Triage Vitals   Temperature Pulse Respirations Blood Pressure SpO2   05/11/24 1506 05/11/24 1501 05/11/24 1501 05/11/24 1501 05/11/24 1501   98 °F (36.7 °C) 84 18 141/82 99 %      Temp src Heart Rate Source Patient Position - Orthostatic VS BP Location FiO2 (%)   -- 05/11/24 1545 05/11/24 1545 05/11/24 1545 --    Monitor Sitting Right arm       Pain Score       05/11/24 1501       7           Vitals:    05/11/24 1545 05/11/24 1600 05/11/24 1630 05/11/24 1730   BP: 131/81 131/67 123/72 134/77   Pulse: 68 69 67 67   Patient Position - Orthostatic VS: Sitting Sitting Sitting Sitting         Visual Acuity      ED Medications  Medications   sodium chloride 0.9 % bolus 1,000 mL (0 mL Intravenous Stopped 5/11/24 1646)   ketorolac (TORADOL) injection 15 mg (15 mg Intravenous Given 5/11/24 1512)   ondansetron (ZOFRAN) injection 4 mg (4 mg Intravenous Given 5/11/24 1512)   ketorolac (TORADOL) injection 15 mg (15 mg Intravenous Given 5/11/24 1558)       Diagnostic Studies  Results Reviewed       Procedure Component Value Units Date/Time    UA w Reflex to Microscopic w Reflex to Culture [[id number]]  (Abnormal) Collected: 05/11/24 1542    Lab Status: Final result Specimen: Urine, Clean Catch Updated: 05/11/24 1547     Color, UA Yellow     Clarity, UA Clear     Specific Gravity, UA <=1.005     pH, UA 6.5     Leukocytes, UA Negative     Nitrite, UA Negative     Protein, UA Negative mg/dl      Glucose, UA  Negative mg/dl      Ketones, UA Negative mg/dl      Urobilinogen, UA 0.2 E.U./dl      Bilirubin, UA Negative     Occult Blood, UA Negative    Comprehensive metabolic panel [[id number]]  (Abnormal) Collected: 05/11/24 1513    Lab Status: Final result Specimen: Blood from Arm, Right Updated: 05/11/24 1544     Sodium 138 mmol/L      Potassium 4.1 mmol/L      Chloride 104 mmol/L      CO2 27 mmol/L      ANION GAP 7 mmol/L      BUN 13 mg/dL      Creatinine 0.90 mg/dL      Glucose 69 mg/dL      Calcium 9.5 mg/dL      AST 11 U/L      ALT 16 U/L      Alkaline Phosphatase 80 U/L      Total Protein 7.7 g/dL      Albumin 4.3 g/dL      Total Bilirubin 0.25 mg/dL      eGFR 76 ml/min/1.73sq m     Narrative:      National Kid[employer] Disease Foundation guidelines for Chronic Kidney Disease (CKD):     Stage 1 with normal or high GFR (GFR > 90 mL/min/1.73 square meters)    Stage 2 Mild CKD (GFR = 60-89 mL/min/1.73 square meters)    Stage 3A Moderate CKD (GFR = 45-59 mL/min/1.73 square meters)    Stage 3B Moderate CKD (GFR = 30-44 mL/min/1.73 square meters)    Stage 4 Severe CKD (GFR = 15-29 mL/min/1.73 square meters)    Stage 5 End Stage CKD (GFR <15 mL/min/1.73 square meters)  Note: GFR calculation is accurate only with a steady state creatinine    CBC and differential [[id number]]  (Abnormal) Collected: 05/11/24 1513    Lab Status: Final result Specimen: Blood from Arm, Right Updated: 05/11/24 1528     WBC 10.02 Thousand/uL      RBC 4.31 Million/uL      Hemoglobin 10.5 g/dL      Hematocrit 34.5 %      MCV 80 fL      MCH 24.4 pg      MCHC 30.4 g/dL      RDW 15.6 %      MPV 9.4 fL      Platelets 413 Thousands/uL      nRBC 0 /100 WBCs      Segmented % 57 %      Immature Grans % 0 %      Lymphocytes % 30 %      Monocytes % 9 %      Eosinophils Relative 3 %      Basophils Relative 1 %      Absolute Neutrophils 5.71 Thousands/µL      Absolute Immature Grans 0.03 Thousand/uL      Absolute Lymphocytes 3.04 Thousands/µL      Absolute Monocytes  0.92 Thousand/µL      Eosinophils Absolute 0.27 Thousand/µL      Basophils Absolute 0.05 Thousands/µL                    CT renal stone study abdomen pelvis without contrast   ED Interpretation by Shannon D Severino, PA-C (05/11 1619)   No evidence of renal stone, nephrolithiasis, hydronephrosis, perinephric stranding, or other evidence of recently passed stone.  No bladder wall thickening to suggest UTI.  No discernible cause for symptoms      Final Result by Gurdeep Colunga MD (05/11 1714)   1. No acute findings. Incidental findings as above.               Workstation performed: [id number]                    Procedures  Procedures         ED Course  ED Course as of 05/11/24 1748   Sat May 11, 2024   1530 Hemoglobin(!): 10.5  chronic   1548 Blood, UA: Negative   1744 went over labs and CT with patient.  No explanation for her symptoms at this time.  She does have a moderate stool burden, explained sometimes gas pains can be very painful.  Advised patient to follow-up with PCP on Monday if pain continues.                               SBIRT 22yo+      Flowsheet Row Most Recent Value   Initial Alcohol Screen: US AUDIT-C     1. How often do you have a drink containing alcohol? 0 Filed at: 05/11/2024 1508   2. How many drinks containing alcohol do you have on a typical day you are drinking?  0 Filed at: 05/11/2024 1508   3a. Male UNDER 65: How often do you have five or more drinks on one occasion? 0 Filed at: 05/11/2024 1508   3b. FEMALE Any Age, or MALE 65+: How often do you have 4 or more drinks on one occassion? 0 Filed at: 05/11/2024 1508   Audit-C Score 0 Filed at: 05/11/2024 1508   ANGELINA: How many times in the past year have you...    Used an illegal drug or used a prescription medication for non-medical reasons? Never Filed at: 05/11/2024 1508                      Medical Decision Making  I considered ureterolithiasis, pyelonephrosis, UTI among others.  Workup unremarkable, CT unremarkable    Amount and/or Complexity  of Data Reviewed  Labs: ordered. Decision-making details documented in ED Course.  Radiology: ordered and independent interpretation performed.    Risk  Prescription drug management.             Disposition  Final diagnoses:   Left flank pain     Time reflects when diagnosis was documented in both MDM as applicable and the Disposition within this note       Time User Action Codes Description Comment    5/11/2024  5:43 PM Severino, Shannon Add [R10.9] Left flank pain           ED Disposition       ED Disposition   Discharge    Condition   Stable    Date/Time   Sat May 11, 2024 1745    Comment   Margi Willson discharge to home/self care.                   Follow-up Information       Follow up With Specialties Details Why Contact Info    Sincere Ching MD Internal Medicine Schedule an appointment as soon as possible for a visit   [address] [place] [id number] [phone]              Patient's Medications   Discharge Prescriptions    No medications on file       No discharge procedures on file.    PDMP Review         Value Time User    PDMP Reviewed  Yes 3/11/2022  5:02 PM Shyla Mcwilliams MD            ED Provider  Electronically Signed by             Shannon D Severino, PA-C  05/11/24 1741

## 2024-05-11 NOTE — DISCHARGE INSTRUCTIONS
Please schedule follow-up with your family doctor soon as possible for recheck.  Return to the emergency department for any new worsening or concerning symptoms.

## 2024-05-20 DIAGNOSIS — F31.76 BIPOLAR I DISORDER, MOST RECENT EPISODE DEPRESSED, IN FULL REMISSION (HCC): Primary | Chronic | ICD-10-CM

## 2024-05-21 RX ORDER — DULOXETIN HYDROCHLORIDE 60 MG/1
60 CAPSULE, DELAYED RELEASE ORAL DAILY
Qty: 30 CAPSULE | Refills: 4 | Status: SHIPPED | OUTPATIENT
Start: 2024-05-21 | End: 2024-10-18

## 2024-05-23 ENCOUNTER — TELEPHONE (OUTPATIENT)
Dept: PSYCHIATRY | Facility: CLINIC | Age: 49
End: 2024-05-23

## 2024-05-23 NOTE — TELEPHONE ENCOUNTER
Medical record request was received from [facility], for the time frame of 3/29/2023 to Present (last two progress notes). Will be place in Dr. Shyla Mcwilliams mailbox by the end of the day.

## 2024-06-03 DIAGNOSIS — F90.0 ADHD, PREDOMINANTLY INATTENTIVE TYPE: Chronic | ICD-10-CM

## 2024-06-03 DIAGNOSIS — F31.76 BIPOLAR I DISORDER, MOST RECENT EPISODE DEPRESSED, IN FULL REMISSION (HCC): Primary | Chronic | ICD-10-CM

## 2024-06-03 RX ORDER — CARBAMAZEPINE 200 MG/1
200 TABLET ORAL 2 TIMES DAILY
Qty: 60 TABLET | Refills: 3 | Status: SHIPPED | OUTPATIENT
Start: 2024-06-03 | End: 2024-10-01

## 2024-06-03 RX ORDER — ATOMOXETINE 80 MG/1
80 CAPSULE ORAL DAILY
Qty: 30 CAPSULE | Refills: 3 | Status: SHIPPED | OUTPATIENT
Start: 2024-06-03 | End: 2024-10-01

## 2024-08-02 DIAGNOSIS — F31.76 BIPOLAR I DISORDER, MOST RECENT EPISODE DEPRESSED, IN FULL REMISSION (HCC): Chronic | ICD-10-CM

## 2024-08-02 RX ORDER — LAMOTRIGINE 200 MG/1
400 TABLET ORAL
Qty: 180 TABLET | Refills: 0 | Status: SHIPPED | OUTPATIENT
Start: 2024-08-02 | End: 2024-12-30

## 2024-08-16 DIAGNOSIS — K21.9 GASTROESOPHAGEAL REFLUX DISEASE WITHOUT ESOPHAGITIS: ICD-10-CM

## 2024-08-16 DIAGNOSIS — K44.9 HIATAL HERNIA: ICD-10-CM

## 2024-08-16 RX ORDER — OMEPRAZOLE 40 MG/1
40 CAPSULE, DELAYED RELEASE ORAL DAILY
Qty: 90 CAPSULE | Refills: 1 | Status: SHIPPED | OUTPATIENT
Start: 2024-08-16

## 2024-08-19 ENCOUNTER — OFFICE VISIT (OUTPATIENT)
Dept: OBGYN CLINIC | Facility: CLINIC | Age: 49
End: 2024-08-19
Payer: COMMERCIAL

## 2024-08-19 ENCOUNTER — NURSE TRIAGE (OUTPATIENT)
Age: 49
End: 2024-08-19

## 2024-08-19 VITALS
WEIGHT: 212 LBS | DIASTOLIC BLOOD PRESSURE: 64 MMHG | BODY MASS INDEX: 37.56 KG/M2 | SYSTOLIC BLOOD PRESSURE: 122 MMHG | HEIGHT: 63 IN

## 2024-08-19 DIAGNOSIS — N63.21 MASS OF UPPER OUTER QUADRANT OF LEFT BREAST: Primary | ICD-10-CM

## 2024-08-19 PROCEDURE — 99203 OFFICE O/P NEW LOW 30 MIN: CPT | Performed by: OBSTETRICS & GYNECOLOGY

## 2024-08-19 NOTE — PROGRESS NOTES
"Subjective    Patient is a 47 yo  who presents today with a new left breast lump she noticed a few days ago. She previously had a breast cyst, but isn't sure whether this is a new cyst. Her last mammogram was in . She describes the area as painful. She denies any issues with the right breast.     OB History          7    Para   4    Term   2            AB   3    Living   2         SAB   0    IAB        Ectopic        Multiple        Live Births   2           Obstetric Comments   Surgically Induced  x3 per [employer]                  Objective    Vitals:    24 1457   BP: 122/64   BP Location: Left arm   Patient Position: Sitting   Cuff Size: Large   Weight: 96.2 kg (212 lb)   Height: 5' 3\" (1.6 m)        Physical Exam  Constitutional:       Appearance: Normal appearance.   HENT:      Head: Normocephalic and atraumatic.   Cardiovascular:      Rate and Rhythm: Normal rate.   Pulmonary:      Effort: Pulmonary effort is normal. No respiratory distress.   Chest:      Comments: Right breast: normal, no masses, no tenderness  Left breast: tender, 2 cm mass in upper midline  Musculoskeletal:         General: Normal range of motion.   Neurological:      Mental Status: She is alert.   Skin:     General: Skin is warm and dry.          Assessment    Patient Active Problem List   Diagnosis    ADHD, predominantly inattentive type    Bipolar I disorder, most recent episode depressed, in full remission (HCC)    Drug abuse in remission (HCC)    SANA (generalized anxiety disorder)    Vitamin D deficiency    Hiatal hernia    Other specified disorders of arteries and arterioles (HCC)    Other insomnia    Morbid obesity (HCC)    Postsurgical malabsorption    Status post laparoscopic sleeve gastrectomy    Umbilical hernia without obstruction and without gangrene    Benign cyst of left breast    History of multiple concussions    Paroxysmal supraventricular tachycardia    Microcytic anemia    S/P " laparoscopic hysterectomy    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 psychoactive substance (HCC)    Gastroesophageal reflux disease without esophagitis        Plan  - Diagnostic mammogram ordered

## 2024-08-19 NOTE — TELEPHONE ENCOUNTER
"Patient called office, new patient, c/o breast lump she found 3-5 days ago on her left breast above nipple. The nipple is the size of a dime, it doesn't move, it is hard and painful. She denies fever, reddening and any breast abnormality or nipple discharge. She complains of night sweats recently. Warm transferred patient to [facility] in office to schedule sooner appointment.   Advised o call back if symptoms worsen.         Reason for Disposition   Breast lump    Answer Assessment - Initial Assessment Questions  1. SYMPTOM: \"What's the main symptom you're concerned about?\"  (e.g., lump, pain, rash, nipple discharge)      Breast lump- painful   2. LOCATION: \"Where is the lump located?\"      Left breast - above nipple   3. ONSET: \"When did lump start?\"      3-5 days ago   4. PRIOR HISTORY: \"Do you have any history of prior problems with your breasts?\" (e.g., lumps, cancer, fibrocystic breast disease)      Cyst   5. CAUSE: \"What do you think is causing this symptom?\"      Unknown   6. OTHER SYMPTOMS: \"Do you have any other symptoms?\" (e.g., fever, breast pain, redness or rash, nipple discharge)      Night sweats   7. PREGNANCY-BREASTFEEDING: \"Is there any chance you are pregnant?\" \"When was your last menstrual period?\" \"Are you breastfeeding?\"     Hysterectomy    Protocols used: Breast Symptoms-ADULT-[place]    "

## 2024-08-20 ENCOUNTER — TELEPHONE (OUTPATIENT)
Dept: GASTROENTEROLOGY | Facility: CLINIC | Age: 49
End: 2024-08-20

## 2024-08-20 ENCOUNTER — OFFICE VISIT (OUTPATIENT)
Dept: GASTROENTEROLOGY | Facility: MEDICAL CENTER | Age: 49
End: 2024-08-20
Payer: COMMERCIAL

## 2024-08-20 VITALS
SYSTOLIC BLOOD PRESSURE: 118 MMHG | OXYGEN SATURATION: 98 % | HEART RATE: 97 BPM | DIASTOLIC BLOOD PRESSURE: 68 MMHG | WEIGHT: 212 LBS | HEIGHT: 63 IN | BODY MASS INDEX: 37.56 KG/M2 | TEMPERATURE: 98.2 F

## 2024-08-20 DIAGNOSIS — D50.9 MICROCYTIC ANEMIA: ICD-10-CM

## 2024-08-20 DIAGNOSIS — R13.19 OTHER DYSPHAGIA: ICD-10-CM

## 2024-08-20 DIAGNOSIS — K59.09 OTHER CONSTIPATION: ICD-10-CM

## 2024-08-20 DIAGNOSIS — D50.9 MICROCYTIC ANEMIA: Primary | ICD-10-CM

## 2024-08-20 DIAGNOSIS — K21.9 GASTROESOPHAGEAL REFLUX DISEASE, UNSPECIFIED WHETHER ESOPHAGITIS PRESENT: ICD-10-CM

## 2024-08-20 PROCEDURE — 99214 OFFICE O/P EST MOD 30 MIN: CPT | Performed by: PHYSICIAN ASSISTANT

## 2024-08-20 RX ORDER — SOD SULF/POT CHLORIDE/MAG SULF 1.479 G
TABLET ORAL
Qty: 24 TABLET | Refills: 0 | Status: CANCELLED | OUTPATIENT
Start: 2024-08-20

## 2024-08-20 RX ORDER — SODIUM PICOSULFATE, MAGNESIUM OXIDE, AND ANHYDROUS CITRIC ACID 12; 3.5; 1 G/175ML; G/175ML; MG/175ML
LIQUID ORAL
Qty: 350 ML | Refills: 0 | Status: CANCELLED | OUTPATIENT
Start: 2024-08-20

## 2024-08-20 RX ORDER — BISACODYL 5 MG/1
TABLET, DELAYED RELEASE ORAL
Qty: 2 TABLET | Refills: 0 | Status: SHIPPED | OUTPATIENT
Start: 2024-08-20

## 2024-08-20 RX ORDER — BISACODYL 5 MG/1
TABLET, DELAYED RELEASE ORAL
Qty: 2 TABLET | Refills: 0 | Status: SHIPPED | OUTPATIENT
Start: 2024-08-20 | End: 2024-08-20

## 2024-08-20 NOTE — PROGRESS NOTES
[facility] - Outpatient Consultation  Margi Willson 48 y.o. female MRN: [id number]  Encounter: [id number]      Assessment and Plan    1. GERD  2. Dysphagia  3. Microcytic anemia  4. Constipation   The patient has chronic acid reflux associated with both solid and liquid food dysphagia and excessive belching.  She is status post cholecystectomy 4/2016 without any improvement and also status post sleeve gastrectomy 7/2020 which worsened her symptoms.  She is currently on omeprazole 40 mg once daily with relatively good control.  In addition to this she has chronic constipation with a bowel movement every 2 to 3 days as well as chronic microcytic anemia.  She denies any overt GI bleeding  -Continue omeprazole, the patient is interested in definitive long-term management but would like to avoid Christen-en-Y if possible although she is aware this would be next step  -EGD and colonoscopy for further evaluation, risks included, but not limited to, infection, bleeding, perforation, injury to organs in the abdomen, missed lesion and incomplete procedure were discussed  -Start MiraLAX 17 g once daily especially the week leading up to colonoscopy    Follow-up after EGD and colonoscopy    ______________________________________________________________________    History of Present Illness  Margi Willson is a 48 y.o. female with supraventricular tachycardia, bipolar 1 disorder, and drug abuse in remission here for consultation of GERD.  The patient states that she has had GERD for a very long time.  This is associated with excessive belching and some dysphagia where she feels as though food and liquids are both going down her esophagus slowly.  Initially thought it was her gallbladder which was removed and her symptoms did not improve.  Then in 2020/2021 she underwent evaluation for sleeve gastrectomy and following that her symptoms had worsened even further.  At this time they are relatively  well-controlled on omeprazole 40 mg once daily but she has to wait 1 hour prior to laying down and sometimes she still gets breakthrough symptoms.  Otherwise no GI symptoms or complaints aside for chronic mild constipation with a bowel movement every 2 to 3 days.  She is on multiple psychiatric medications which is likely causing her constipation.  Of note she did have recent blood work revealing microcytic anemia.  The patient states that she has had this chronically.  They tried to give her iron infusions in the past but unfortunately she was allergic.  She denies any overt GI bleeding.      Review of Systems   Constitutional:  Negative for activity change, appetite change, chills, fatigue, fever and unexpected weight change.   Gastrointestinal:  Positive for constipation. Negative for abdominal distention, abdominal pain, anal bleeding, blood in stool, diarrhea, nausea, rectal pain and vomiting.   Musculoskeletal:  Negative for back pain and gait problem.   Psychiatric/Behavioral:  Negative for confusion.        Past Medical History  Past Medical History:   Diagnosis Date    Abnormal uterine bleeding (AUB)     Formatting of this note might be different from the original.  Prolonged h/o heavy painful menses. Tried Mirena IUD but with minimal improvement.  Pelvic US w/ suspected adenomyosis.    Acute pancreatitis     Last Assessed: 2016; due to gallstones (removed)    Anemia     Anxiety     Arthralgia     Concussion     Last Assessed: 2012    Endometriosis     Esophageal reflux     Familial combined hyperlipidemia     Last Assessed: [id number]    Gastropathy     Head injury     History of sinusitis     Irregular heart beat     Migraine     Last Assessed: 2012    Obstructive sleep apnea treated with continuous positive airway pressure (CPAP) 10/25/2021    Paroxysmal supraventricular tachycardia     Peptic ulcer disease     Post concussion syndrome 10/26/2021    Spontaneous      Substance abuse  (HCC)     Suicide attempt (HCC)     Urinary tract infection     UTI (urinary tract infection)     Vitamin D deficiency     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 psychoactive substance (HCC) 2024       Past Social history  Past Surgical History:   Procedure Laterality Date    BARIATRIC SURGERY  2020    sleeve     SECTION      FOREARM SURGERY Left     HYSTERECTOMY  2023    INDUCED       x3    LAPAROSCOPIC CHOLECYSTECTOMY      Last Assessed: 2016    LAPAROSCOPIC TOTAL HYSTERECTOMY      LAPAROSCOPY      Exploratory    SLEEVE GASTROPLASTY      TONSILLECTOMY      Onset: [id number]     Social History     Socioeconomic History    Marital status: /Civil Union     Spouse name: Not on file    Number of children: 2    Years of education: bachelor's degree    Highest education level: Bachelor's degree (e.g., BA, AB, BS)   Occupational History    Occupation: works as a drug and alcohol counselor   Tobacco Use    Smoking status: Former     Current packs/day: 0.00     Average packs/day: 0.3 packs/day for 3.2 years (0.8 ttl pk-yrs)     Types: Cigarettes     Start date:      Quit date: 2020     Years since quittin.5    Smokeless tobacco: Never    Tobacco comments:     About 3 cigs a day   Vaping Use    Vaping status: Never Used   Substance and Sexual Activity    Alcohol use: Yes     Comment: social as og  when asked    Drug use: No     Comment: Past cocaine and marijuana use for 2 years until . Also tried LSD years ago. 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    Functioning Relationships: good support system,  is supportive    Legal History: none     History: None     Social Determinants of Health     Financial Resource Strain: Low Risk  (11/24/2023)    Overall Financial Resource Strain (CARDIA)     Difficulty of Paying Living Expenses: Not hard at all   Food Insecurity: No Food Insecurity (11/24/2023)    Hunger Vital Sign     Worried About Running Out of Food in the Last Year: Never true     Ran Out of Food in the Last Year: Never true   Transportation Needs: No Transportation Needs (11/24/2023)    PRAPARE - Transportation     Lack of Transportation (Medical): No     Lack of Transportation (Non-Medical): No   Physical Activity: Inactive (11/24/2023)    Exercise Vital Sign     Days of Exercise per Week: 0 days     Minutes of Exercise per Session: 0 min   Stress: No Stress Concern Present (11/24/2023)    [employer] - Occupational Stress Questionnaire     Feeling of Stress : Only a little   Social Connections: Moderately Isolated (11/24/2023)    Social Connection and Isolation Panel [NHANES]     Frequency of Communication with Friends and Family: More than three times a week     Frequency of Social Gatherings with Friends and Family: More than three times a week     Attends Mandaen Services: Never     Active Member of Clubs or Organizations: No     Attends Club or Organization Meetings: Never     Marital Status:    Intimate Partner Violence: Not At Risk (11/24/2023)    Humiliation, Afraid, Rape, and Kick questionnaire     Fear of Current or Ex-Partner: No     Emotionally Abused: No     Physically Abused: No     Sexually Abused: No   Housing  Stability: Low Risk  (2023)    Housing Stability Vital Sign     Unable to Pay for Housing in the Last Year: No     Number of Times Moved in the Last Year: 1     Homeless in the Last Year: No     Social History     Substance and Sexual Activity   Alcohol Use Yes    Comment: social as og  when asked     Social History     Substance and Sexual Activity   Drug Use No    Comment: Past cocaine and marijuana use for 2 years until . Also tried LSD years ago. No current recreational drug use     Social History     Tobacco Use   Smoking Status Former    Current packs/day: 0.00    Average packs/day: 0.3 packs/day for 3.2 years (0.8 ttl pk-yrs)    Types: Cigarettes    Start date:     Quit date: 2020    Years since quittin.5   Smokeless Tobacco Never   Tobacco Comments    About 3 cigs a day       Past Family History  Family History   Problem Relation Age of Onset    Hyperthyroidism Mother     Thyroid disease Mother     Depression Father     Hypertension Father     Obesity Father     Other Paternal Grandmother         Cardiac Disorder    Dementia Paternal Grandmother     Hypertension Paternal Grandmother     Diabetes Paternal Grandmother     Hypertension Paternal Grandfather     Other Paternal Grandfather         Cardiac Disorder    Diabetes Paternal Grandfather     Schizophrenia Other     Schizophrenia Other     Suicidality Other     Completed Suicide  Other     Breast cancer Other     Hypertension Paternal Aunt     No Known Problems Maternal Grandmother     No Known Problems Maternal Grandfather     No Known Problems Son     No Known Problems Son     No Known Problems Brother     No Known Problems Maternal Aunt     Stroke Neg Hx     Drug abuse Neg Hx        Current Medications  Current Outpatient Medications   Medication Sig Dispense Refill    atomoxetine (STRATTERA) 80 MG capsule Take 1 capsule (80 mg total) by mouth daily 30 capsule 3    carBAMazepine (TEGretol) 200 mg tablet Take 1 tablet (200 mg  "total) by mouth 2 (two) times a day 60 tablet 3    DULoxetine (CYMBALTA) 60 mg delayed release capsule Take 1 capsule (60 mg total) by mouth daily 30 capsule 4    hydrOXYzine HCL (ATARAX) 50 mg tablet Take 1 tablet (50 mg total) by mouth 2 (two) times a day 60 tablet 4    lamoTRIgine (LaMICtal) 200 MG tablet TAKE 2 TABLETS (400 MG TOTAL) BY MOUTH DAILY AT BEDTIME 180 tablet 0    omeprazole (PriLOSEC) 40 MG capsule TAKE 1 CAPSULE (40 MG TOTAL) BY MOUTH DAILY. 90 capsule 1    traZODone (DESYREL) 50 mg tablet Take 1-2 tablets ( mg total) by mouth daily at bedtime as needed for sleep 60 tablet 4    Ubrelvy 100 MG tablet Take 100 mg by mouth if needed       No current facility-administered medications for this visit.       Allergies  Allergies   Allergen Reactions    Geodon [Ziprasidone]      Nystagmus    Quetiapine      Other reaction(s): Other (See Comments)  Per pt low blood pressure and fainting    Iron Other (See Comments)     Sensitivity to the iron they gave her at her infusion. Itchy scratchy throat and warm    Other      Seasonal allergies         The following portions of the patient's history were reviewed and updated as appropriate: allergies, current medications, past medical history, past social history, past surgical history and problem list.      Vitals  Vitals:    08/20/24 1231   Weight: 96.2 kg (212 lb)   Height: 5' 3\" (1.6 m)         Physical Exam  Constitutional   General appearance: Patient is seated and in no acute distress, well appearing and well nourished.   Head and Face   Head and face: Normal.    Eyes   Conjunctiva and lids: No erythema, swelling or discharge.  Anicteric.  Ears, Nose, Mouth, and Throat   Hearing: Normal.    Neck: Supple, trachea midline.  Pulmonary   Respiratory effort: No increased work of breathing or signs of respiratory distress.    Lungs: Clear to ascultation, no wheezes, rhonchi, or rales  Cardiovascular   Examination of extremities for edema and/or varicosities: " Normal.    Musculoskeletal   Gait and station: Normal    Skin   Skin and subcutaneous tissue: Warm, dry, and intact. No visible jaundice, lesions or rashes.  Psychiatric   Judgment and insight: Normal  Recent and remote memory:  Normal  Mood and affect: Normal      Results  No visits with results within 1 Day(s) from this visit.   Latest known visit with results is:   Admission on 05/11/2024, Discharged on 05/11/2024   Component Date Value    WBC 05/11/2024 10.02     RBC 05/11/2024 4.31     Hemoglobin 05/11/2024 10.5 (L)     Hematocrit 05/11/2024 34.5 (L)     MCV 05/11/2024 80 (L)     MCH 05/11/2024 24.4 (L)     MCHC 05/11/2024 30.4 (L)     RDW 05/11/2024 15.6 (H)     MPV 05/11/2024 9.4     Platelets 05/11/2024 413 (H)     nRBC 05/11/2024 0     Segmented % 05/11/2024 57     Immature Grans % 05/11/2024 0     Lymphocytes % 05/11/2024 30     Monocytes % 05/11/2024 9     Eosinophils Relative 05/11/2024 3     Basophils Relative 05/11/2024 1     Absolute Neutrophils 05/11/2024 5.71     Absolute Immature Grans 05/11/2024 0.03     Absolute Lymphocytes 05/11/2024 3.04     Absolute Monocytes 05/11/2024 0.92     Eosinophils Absolute 05/11/2024 0.27     Basophils Absolute 05/11/2024 0.05     Sodium 05/11/2024 138     Potassium 05/11/2024 4.1     Chloride 05/11/2024 104     CO2 05/11/2024 27     ANION GAP 05/11/2024 7     BUN 05/11/2024 13     Creatinine 05/11/2024 0.90     Glucose 05/11/2024 69     Calcium 05/11/2024 9.5     AST 05/11/2024 11 (L)     ALT 05/11/2024 16     Alkaline Phosphatase 05/11/2024 80     Total Protein 05/11/2024 7.7     Albumin 05/11/2024 4.3     Total Bilirubin 05/11/2024 0.25     eGFR 05/11/2024 76     Color, UA 05/11/2024 Yellow     Clarity, UA 05/11/2024 Clear     Specific Gravity, UA 05/11/2024 <=1.005 (L)     pH, UA 05/11/2024 6.5     Leukocytes, UA 05/11/2024 Negative     Nitrite, UA 05/11/2024 Negative     Protein, UA 05/11/2024 Negative     Glucose, UA 05/11/2024 Negative     Ketones, UA  05/11/2024 Negative     Urobilinogen, UA 05/11/2024 0.2     Bilirubin, UA 05/11/2024 Negative     Occult Blood, UA 05/11/2024 Negative        Radiology Results  No results found.    Orders  Orders Placed This Encounter   Procedures    Colonoscopy     Standing Status:   Future     Standing Expiration Date:   8/20/2025     Order Specific Question:   Requested Site     Answer:   [place]     Order Specific Question:   Performing Location     Answer:   Endoscopy Dept     Order Specific Question:   Anesthesia required?     Answer:   Yes    EGD     Standing Status:   Future     Standing Expiration Date:   8/20/2025     Order Specific Question:   Requested Site     Answer:   [place]     Order Specific Question:   Performing Location     Answer:   Endoscopy Dept     Order Specific Question:   Anesthesia required?     Answer:   Yes

## 2024-08-20 NOTE — TELEPHONE ENCOUNTER
Procedure: Colon/EGD  Date: 09/05/2024  Physician performing: Dr. Merritt  Location of procedure:  [place]  Instructions given to patient: Golytely   Diabetic: N/A  Clearances: N/A

## 2024-08-22 ENCOUNTER — TELEPHONE (OUTPATIENT)
Dept: GASTROENTEROLOGY | Facility: CLINIC | Age: 49
End: 2024-08-22

## 2024-08-22 NOTE — TELEPHONE ENCOUNTER
Spoke with pt confirming 9/5/24 procedure with Dr. Eris Merritt @ [facility]. Pt has proc prep instructions, Golytely/Dulcolax. Sent proc prep instructions thru [facility] [place] as well. Pt confirmed procedure.

## 2024-08-23 ENCOUNTER — ANESTHESIA EVENT (OUTPATIENT)
Dept: ANESTHESIOLOGY | Facility: HOSPITAL | Age: 49
End: 2024-08-23

## 2024-08-23 ENCOUNTER — ANESTHESIA (OUTPATIENT)
Dept: ANESTHESIOLOGY | Facility: HOSPITAL | Age: 49
End: 2024-08-23

## 2024-08-26 DIAGNOSIS — F31.76 BIPOLAR I DISORDER, MOST RECENT EPISODE DEPRESSED, IN FULL REMISSION (HCC): Chronic | ICD-10-CM

## 2024-08-27 ENCOUNTER — TELEPHONE (OUTPATIENT)
Dept: PSYCHIATRY | Facility: CLINIC | Age: 49
End: 2024-08-27

## 2024-08-27 ENCOUNTER — HOSPITAL ENCOUNTER (OUTPATIENT)
Dept: ULTRASOUND IMAGING | Facility: CLINIC | Age: 49
Discharge: HOME | End: 2024-08-27
Payer: COMMERCIAL

## 2024-08-27 ENCOUNTER — HOSPITAL ENCOUNTER (OUTPATIENT)
Dept: MAMMOGRAPHY | Facility: CLINIC | Age: 49
Discharge: HOME | End: 2024-08-27
Payer: COMMERCIAL

## 2024-08-27 VITALS — HEIGHT: 63 IN | WEIGHT: 212 LBS | BODY MASS INDEX: 37.56 KG/M2

## 2024-08-27 DIAGNOSIS — N63.21 MASS OF UPPER OUTER QUADRANT OF LEFT BREAST: ICD-10-CM

## 2024-08-27 PROCEDURE — 76642 ULTRASOUND BREAST LIMITED: CPT

## 2024-08-27 PROCEDURE — 77066 DX MAMMO INCL CAD BI: CPT

## 2024-08-27 PROCEDURE — G0279 TOMOSYNTHESIS, MAMMO: HCPCS

## 2024-08-27 RX ORDER — LAMOTRIGINE 200 MG/1
400 TABLET ORAL
Qty: 180 TABLET | Refills: 0 | OUTPATIENT
Start: 2024-08-27 | End: 2025-01-24

## 2024-08-27 NOTE — TELEPHONE ENCOUNTER
Prior authorization has been generated via [employer] for atomoxetine 80 mg, in Media.

## 2024-08-27 NOTE — TELEPHONE ENCOUNTER
PA for Atomoxetine HCl 80MG capsules SUBMITTED     via    [x]CMM-KEY: [id number]  [][employer]-Case ID #   []Faxed to plan   []Other website   []Phone call Case ID #     Office notes sent, clinical questions answered. Awaiting determination    Turnaround time for your insurance to make a decision on your Prior Authorization can take 7-21 business days.

## 2024-08-29 NOTE — TELEPHONE ENCOUNTER
PA for Atomoxetine HCl 80MG capsules  APPROVED     Date(s) approved 8/27/2024 to 8/26/2025    Case #     Patient advised by          [x][facility] [employer] [employer] Message  []Phone call   []LMOM  []L/M to call office as no active Communication consent on file  []Unable to leave detailed message as VM not approved on Communication consent       Pharmacy advised by    [x]Fax  []Phone call    Approval letter scanned into [employer] Yes
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
## 2024-10-01 ENCOUNTER — OFFICE VISIT (OUTPATIENT)
Dept: PSYCHIATRY | Facility: CLINIC | Age: 49
End: 2024-10-01
Payer: COMMERCIAL

## 2024-10-01 VITALS
BODY MASS INDEX: 38.8 KG/M2 | SYSTOLIC BLOOD PRESSURE: 123 MMHG | DIASTOLIC BLOOD PRESSURE: 76 MMHG | HEIGHT: 63 IN | WEIGHT: 219 LBS | HEART RATE: 78 BPM

## 2024-10-01 DIAGNOSIS — F31.73 BIPOLAR I DISORDER, MOST RECENT EPISODE MANIC, IN PARTIAL REMISSION (HCC): Primary | Chronic | ICD-10-CM

## 2024-10-01 DIAGNOSIS — F90.0 ADHD, PREDOMINANTLY INATTENTIVE TYPE: Chronic | ICD-10-CM

## 2024-10-01 DIAGNOSIS — G47.09 OTHER INSOMNIA: Chronic | ICD-10-CM

## 2024-10-01 DIAGNOSIS — F41.1 GAD (GENERALIZED ANXIETY DISORDER): Chronic | ICD-10-CM

## 2024-10-01 DIAGNOSIS — Z79.899 LONG-TERM USE OF HIGH-RISK MEDICATION: Chronic | ICD-10-CM

## 2024-10-01 PROCEDURE — 90833 PSYTX W PT W E/M 30 MIN: CPT | Performed by: PSYCHIATRY & NEUROLOGY

## 2024-10-01 PROCEDURE — 99214 OFFICE O/P EST MOD 30 MIN: CPT | Performed by: PSYCHIATRY & NEUROLOGY

## 2024-10-01 RX ORDER — TRAZODONE HYDROCHLORIDE 50 MG/1
50-100 TABLET, FILM COATED ORAL
Qty: 60 TABLET | Refills: 4 | Status: SHIPPED | OUTPATIENT
Start: 2024-10-01 | End: 2025-02-28

## 2024-10-01 RX ORDER — DULOXETIN HYDROCHLORIDE 30 MG/1
30 CAPSULE, DELAYED RELEASE ORAL DAILY
Qty: 30 CAPSULE | Refills: 0 | Status: SHIPPED | OUTPATIENT
Start: 2024-10-01 | End: 2024-10-31

## 2024-10-01 RX ORDER — CARBAMAZEPINE 200 MG/1
200 TABLET ORAL 2 TIMES DAILY
Qty: 60 TABLET | Refills: 4 | Status: SHIPPED | OUTPATIENT
Start: 2024-10-01 | End: 2025-02-28

## 2024-10-01 RX ORDER — ATOMOXETINE 80 MG/1
80 CAPSULE ORAL DAILY
Qty: 30 CAPSULE | Refills: 4 | Status: SHIPPED | OUTPATIENT
Start: 2024-10-01 | End: 2025-02-28

## 2024-10-01 RX ORDER — HYDROXYZINE HYDROCHLORIDE 50 MG/1
50 TABLET, FILM COATED ORAL 2 TIMES DAILY
Qty: 60 TABLET | Refills: 4 | Status: SHIPPED | OUTPATIENT
Start: 2024-10-01 | End: 2025-02-28

## 2024-10-01 RX ORDER — LAMOTRIGINE 200 MG/1
400 TABLET ORAL
Qty: 60 TABLET | Refills: 4 | Status: SHIPPED | OUTPATIENT
Start: 2024-10-01 | End: 2025-02-28

## 2024-10-01 NOTE — BH CRISIS PLAN
"Client Name: Margi Willson       Client YOB: 1975    [facility]      Creation Date: 11/24/23 Update Date: 10/1/24   Created By: Shyla Mcwilliams MD Last Updated By: Shyla Mcwilliams MD      Step 1: Warning Signs:   Warning Signs   \" Financial stress, loneliness, boredom \"            Step 2: Internal Coping Strategies:   Internal Coping Strategies   \" Listen to music, clean clutter, reach out to supports \"            Step 3: People and social settings that provide distraction:   Name Contact Information   Lucho Graff (spouse) [phone]   Mother Annie [phone]            Step 4: People whom I can ask for help during a crisis:      Name Contact Information    Lucho Graff (spouse) [phone]    Mother Annie [phone]      Step 5: Professionals or agencies I can contact during a crisis:      Clinican/Agency Name Phone Emergency Contact    [facility] [phone]       [facility] Emergency Department Emergency Department Phone Emergency Department Address    Count includes the [facility] 911         Crisis Phone Numbers:   Suicide Prevention Lifeline: Call or Text  840 Crisis Text Line: Text HOME to 543-953   Please note: Some [facility] do not have a separate number for Child/Adolescent specific crisis. If your county is not listed under Child/Adolescent, please call the adult number for your county      Adult Crisis Numbers: Child/Adolescent Crisis Numbers   [facility]: [phone] [facility]: [phone]   [facility]: [phone] [facility]: [phone]   [facility]: [phone] [place]: [phone]   [facility]: [phone] [place] [facility]: [phone]   [place] [facility]: [phone]   [facility]: [phone]   [facility]: [phone]   [facility]: [phone] (daytime) [phone] (after hours, weekends, holidays)      Step 6: Making the environment safer (plan for " lethal means safety):   Plan: Gun - locked by      Optional: What is most important to me and worth living for?   Watching my son play baseball     [place] Safety Plan. Cathy Lombardo and Keegan Martinez. Used with permission of the authors.

## 2024-10-01 NOTE — ASSESSMENT & PLAN NOTE
Monitor carbamazepine level, CBC/diff, CMP, and lipid profile before next visit  Orders:    CBC and differential; Future    Comprehensive metabolic panel; Future    Lipid panel; Future    Carbamazepine level, total; Future

## 2024-10-01 NOTE — BH TREATMENT PLAN
"TREATMENT PLAN (Medication Management Only)        [facility]    Name/Date of Birth/MRN:  Margi Willson 48 y.o. 1975 MRN: [id number]  Date of Treatment Plan: October 1, 2024  Diagnosis/Diagnoses:   1. Bipolar I disorder, most recent episode manic, in partial remission (HCC)    2. SANA (generalized anxiety disorder)    3. ADHD, predominantly inattentive type    4. Long-term use of high-risk medication    5. Other insomnia      Strengths/Personal Resources for Self-Care: \"med compliant, self-awareness\".  Area/Areas of need (in own words): \"being more open and honest with others\".  1. Long Term Goal:   improve control of anxiety, improve mood stability  Target Date: 2 months - 12/1/2024  Person/Persons responsible for completion of goal: Margi  2.  Short Term Objective (s) - How will we reach this goal?:   A.  Provider new recommended medication/dosage changes and/or continue medication(s): taper off Cymbalta, continue all other medications (Trazodone, Tegretol, Lamictal, Atarax, and Strattera).  B.  N/A.  C.  N/A.  Target Date: 2 months - 12/1/2024  Person/Persons Responsible for Completion of Goal: Margi   Progress Towards Goals: moderate progress  Treatment Modality: medication management every 2 months, referral for individual psychotherapy  Review due 180 days from date of this plan: 6 months - 4/1/2025  Expected length of service: ongoing treatment unless revised  My Physician/PA/NP and I have developed this plan together and I agree to work on the goals and objectives. I understand the treatment goals that were developed for my treatment.  Electronic Signatures: on file (unless signed below)    Shyla Mcwilliams MD 10/01/24  "

## 2024-10-01 NOTE — ASSESSMENT & PLAN NOTE
Recent manic episode - mood is more stable now  Continue Lamictal 400 mg at bedtime to help with mood stabilization  Continue Tegretol 200 mg twice a day also to help with mood stabilization  Decrease Cymbalta to 30 mg daily for 2 to 4 weeks the stop Cymbalta - due to recent manic episode  Orders:    lamoTRIgine (LaMICtal) 200 MG tablet; Take 2 tablets (400 mg total) by mouth daily at bedtime    carBAMazepine (TEGretol) 200 mg tablet; Take 1 tablet (200 mg total) by mouth 2 (two) times a day    CBC and differential; Future    Comprehensive metabolic panel; Future    Lipid panel; Future    Carbamazepine level, total; Future

## 2024-10-01 NOTE — ASSESSMENT & PLAN NOTE
Stable  Continue Strattera 80 mg daily to help with attention and concentration  Orders:    atomoxetine (STRATTERA) 80 MG capsule; Take 1 capsule (80 mg total) by mouth daily

## 2024-10-01 NOTE — ASSESSMENT & PLAN NOTE
Stable  Continue Trazodone 50 mg to 100 mg at bedtime as needed to help with insomnia  Orders:    traZODone (DESYREL) 50 mg tablet; Take 1-2 tablets ( mg total) by mouth daily at bedtime as needed for sleep

## 2024-10-01 NOTE — ASSESSMENT & PLAN NOTE
On and off anxiety symptoms  Continue Atarax 50 mg twice a day to control anxiety symptoms  Orders:    DULoxetine (CYMBALTA) 30 mg delayed release capsule; Take 1 capsule (30 mg total) by mouth daily    hydrOXYzine HCL (ATARAX) 50 mg tablet; Take 1 tablet (50 mg total) by mouth 2 (two) times a day

## 2024-10-08 ENCOUNTER — TELEPHONE (OUTPATIENT)
Dept: PSYCHIATRY | Facility: CLINIC | Age: 49
End: 2024-10-08

## 2024-10-08 NOTE — TELEPHONE ENCOUNTER
LVM to inform provider is virtualonly on Fridays. Changed appt to virtual as PT has active mychart. Please r/s if PT is PT can't do the appt.

## 2024-11-18 ENCOUNTER — OFFICE VISIT (OUTPATIENT)
Dept: INTERNAL MEDICINE CLINIC | Facility: OTHER | Age: 49
End: 2024-11-18
Payer: COMMERCIAL

## 2024-11-18 VITALS
SYSTOLIC BLOOD PRESSURE: 112 MMHG | DIASTOLIC BLOOD PRESSURE: 74 MMHG | HEIGHT: 63 IN | TEMPERATURE: 98.6 F | WEIGHT: 220.8 LBS | RESPIRATION RATE: 18 BRPM | BODY MASS INDEX: 39.12 KG/M2 | OXYGEN SATURATION: 99 % | HEART RATE: 81 BPM

## 2024-11-18 DIAGNOSIS — J40 BRONCHITIS: Primary | ICD-10-CM

## 2024-11-18 PROCEDURE — 99213 OFFICE O/P EST LOW 20 MIN: CPT | Performed by: INTERNAL MEDICINE

## 2024-11-18 RX ORDER — PREDNISONE 20 MG/1
40 TABLET ORAL DAILY
Qty: 10 TABLET | Refills: 0 | Status: SHIPPED | OUTPATIENT
Start: 2024-11-18 | End: 2024-11-23

## 2024-11-18 RX ORDER — ALBUTEROL SULFATE 90 UG/1
2 INHALANT RESPIRATORY (INHALATION) EVERY 6 HOURS PRN
Qty: 6.7 G | Refills: 0 | Status: SHIPPED | OUTPATIENT
Start: 2024-11-18

## 2024-11-18 NOTE — PROGRESS NOTES
Name: Margi Willson      : 1975      MRN: [id number]  Encounter Provider: Sincere Ching MD  Encounter Date: 2024   Encounter department: [facility]  :  Assessment & Plan  Bronchitis  Will defer on antibiotics at this time however if symptoms do not improve by this Thursday will prescribe azithromycin.  Will prescribe prednisone 40 mg daily for 5 days as well as an albuterol rescue inhaler.  She is to contact our office worsening symptoms.    Orders:    predniSONE 20 mg tablet; Take 2 tablets (40 mg total) by mouth daily for 5 days    albuterol (ProAir HFA) 90 mcg/act inhaler; Inhale 2 puffs every 6 (six) hours as needed for wheezing or shortness of breath           History of Present Illness     Margi Willson is seen today with persistent URI symptoms of 3 weeks duration.   Her symptoms were improving after 2 weeks however then gt worse.   She has been experiencing SOB/KEMP, lethargy, and crackling in her lungs with deep inspiration.     URI   This is a new problem. The current episode started 1 to 4 weeks ago. The problem has been gradually worsening. Pertinent negatives include no abdominal pain, chest pain, congestion, coughing, diarrhea, dysuria, headaches, nausea, rhinorrhea, sinus pain, sneezing, sore throat, vomiting or wheezing. She has tried nothing for the symptoms.     Review of Systems   Constitutional:  Negative for activity change, appetite change, chills, diaphoresis, fatigue and fever.   HENT:  Negative for congestion, postnasal drip, rhinorrhea, sinus pressure, sinus pain, sneezing and sore throat.    Eyes:  Negative for visual disturbance.   Respiratory:  Negative for apnea, cough, choking, chest tightness, shortness of breath and wheezing.    Cardiovascular:  Negative for chest pain, palpitations and leg swelling.   Gastrointestinal:  Negative for abdominal distention, abdominal pain, anal bleeding, blood in stool, constipation, diarrhea,  nausea and vomiting.   Endocrine: Negative for cold intolerance and heat intolerance.   Genitourinary:  Negative for difficulty urinating, dysuria and hematuria.   Musculoskeletal: Negative.    Skin: Negative.    Neurological:  Negative for dizziness, weakness, light-headedness, numbness and headaches.   Hematological:  Negative for adenopathy.   Psychiatric/Behavioral:  Negative for agitation, sleep disturbance and suicidal ideas.    All other systems reviewed and are negative.    Medical History Reviewed by provider this encounter:  Tobacco  Allergies  Meds  Problems  Med Hx  Surg Hx  Fam Hx     .  Current Outpatient Medications on File Prior to Visit   Medication Sig Dispense Refill    atomoxetine (STRATTERA) 80 MG capsule Take 1 capsule (80 mg total) by mouth daily 30 capsule 4    carBAMazepine (TEGretol) 200 mg tablet Take 1 tablet (200 mg total) by mouth 2 (two) times a day 60 tablet 4    DULoxetine (CYMBALTA) 30 mg delayed release capsule Take 1 capsule (30 mg total) by mouth daily 30 capsule 0    hydrOXYzine HCL (ATARAX) 50 mg tablet Take 1 tablet (50 mg total) by mouth 2 (two) times a day 60 tablet 4    lamoTRIgine (LaMICtal) 200 MG tablet Take 2 tablets (400 mg total) by mouth daily at bedtime 60 tablet 4    omeprazole (PriLOSEC) 40 MG capsule TAKE 1 CAPSULE (40 MG TOTAL) BY MOUTH DAILY. 90 capsule 1    traZODone (DESYREL) 50 mg tablet Take 1-2 tablets ( mg total) by mouth daily at bedtime as needed for sleep 60 tablet 4    [DISCONTINUED] bisacodyl 5 mg EC tablet TAKE AS DIRECTED AS PER WRITTEN OFFICE INSTRUCTIONS (Patient not taking: Reported on 11/18/2024) 2 tablet 0    [DISCONTINUED] polyethylene glycol (GOLYTELY) 4000 mL solution Take 4,000 mL by mouth once for 1 dose (Patient not taking: Reported on 10/1/2024) 4000 mL 0     No current facility-administered medications on file prior to visit.      Social History     Tobacco Use    Smoking status: Former     Current packs/day: 0.00      "Average packs/day: 0.3 packs/day for 3.2 years (0.8 ttl pk-yrs)     Types: Cigarettes     Start date:      Quit date: 2020     Years since quittin.8    Smokeless tobacco: Never    Tobacco comments:     About 3 cigs a day   Vaping Use    Vaping status: Never Used   Substance and Sexual Activity    Alcohol use: Not Currently     Comment: social as og  when asked    Drug use: No     Comment: Past cocaine and marijuana use for 2 years until . Also tried LSD years ago. No current recreational drug use    Sexual activity: Yes     Partners: Male     Birth control/protection: I.U.D., Male Sterilization        Objective   /74 (BP Location: Left arm, Patient Position: Sitting, Cuff Size: Standard)   Pulse 81   Temp 98.6 °F (37 °C) (Temporal)   Resp 18   Ht 5' 3\" (1.6 m)   Wt 100 kg (220 lb 12.8 oz)   LMP  (LMP Unknown)   SpO2 99%   BMI 39.11 kg/m²      Physical Exam  Vitals and nursing note reviewed.   Constitutional:       General: She is not in acute distress.     Appearance: She is well-developed. She is not diaphoretic.   HENT:      Head: Normocephalic and atraumatic.   Eyes:      General:         Right eye: No discharge.         Left eye: No discharge.      Conjunctiva/sclera: Conjunctivae normal.      Pupils: Pupils are equal, round, and reactive to light.   Neck:      Thyroid: No thyromegaly.      Vascular: No JVD.   Cardiovascular:      Rate and Rhythm: Normal rate and regular rhythm.      Heart sounds: Normal heart sounds. No murmur heard.     No friction rub. No gallop.   Pulmonary:      Effort: Pulmonary effort is normal. No respiratory distress.      Breath sounds: Normal breath sounds. No wheezing or rales.   Chest:      Chest wall: No tenderness.   Abdominal:      General: There is no distension.      Palpations: Abdomen is soft.      Tenderness: There is no abdominal tenderness.   Musculoskeletal:         General: No tenderness or deformity. Normal range of motion.      " Cervical back: Normal range of motion and neck supple.   Lymphadenopathy:      Cervical: No cervical adenopathy.   Skin:     General: Skin is warm and dry.      Coloration: Skin is not pale.      Findings: No erythema or rash.   Neurological:      Mental Status: She is alert and oriented to person, place, and time.      Cranial Nerves: No cranial nerve deficit.      Coordination: Coordination normal.   Psychiatric:         Behavior: Behavior normal.         Thought Content: Thought content normal.         Judgment: Judgment normal.       Administrative Statements   I have spent a total time of 15 minutes in caring for this patient on the day of the visit/encounter including Prognosis, Risks and benefits of tx options, Instructions for management, Patient and family education, Risk factor reductions, Impressions, Counseling / Coordination of care, and Reviewing / ordering tests, medicine, procedures  .

## 2024-11-18 NOTE — ASSESSMENT & PLAN NOTE
Will defer on antibiotics at this time however if symptoms do not improve by this Thursday will prescribe azithromycin.  Will prescribe prednisone 40 mg daily for 5 days as well as an albuterol rescue inhaler.  She is to contact our office worsening symptoms.    Orders:    predniSONE 20 mg tablet; Take 2 tablets (40 mg total) by mouth daily for 5 days    albuterol (ProAir HFA) 90 mcg/act inhaler; Inhale 2 puffs every 6 (six) hours as needed for wheezing or shortness of breath

## 2024-12-04 ENCOUNTER — TELEPHONE (OUTPATIENT)
Dept: NEUROLOGY | Facility: CLINIC | Age: 49
End: 2024-12-04

## 2024-12-04 NOTE — TELEPHONE ENCOUNTER
LMOM for pt to confirm their   11a  appt on    12/9 w/ René . Reminded pt to arrive 15 mins prior to appt to check in with . Gave call back number [phone] to cancel/reschedule.

## 2024-12-09 ENCOUNTER — TRANSCRIBE ORDERS (OUTPATIENT)
Dept: SLEEP CENTER | Facility: CLINIC | Age: 49
End: 2024-12-09

## 2024-12-09 ENCOUNTER — OFFICE VISIT (OUTPATIENT)
Dept: NEUROLOGY | Facility: CLINIC | Age: 49
End: 2024-12-09
Payer: COMMERCIAL

## 2024-12-09 VITALS
OXYGEN SATURATION: 98 % | HEIGHT: 63 IN | SYSTOLIC BLOOD PRESSURE: 132 MMHG | DIASTOLIC BLOOD PRESSURE: 80 MMHG | BODY MASS INDEX: 39.25 KG/M2 | TEMPERATURE: 98.3 F | WEIGHT: 221.5 LBS | HEART RATE: 74 BPM

## 2024-12-09 DIAGNOSIS — E66.9 OBESITY (BMI 30-39.9): ICD-10-CM

## 2024-12-09 DIAGNOSIS — G47.33 OSA (OBSTRUCTIVE SLEEP APNEA): ICD-10-CM

## 2024-12-09 DIAGNOSIS — R47.89 WORD FINDING DIFFICULTY: ICD-10-CM

## 2024-12-09 DIAGNOSIS — G43.709 CHRONIC MIGRAINE WITHOUT AURA WITHOUT STATUS MIGRAINOSUS, NOT INTRACTABLE: Primary | ICD-10-CM

## 2024-12-09 DIAGNOSIS — F31.73 BIPOLAR I DISORDER, MOST RECENT EPISODE MANIC, IN PARTIAL REMISSION (HCC): Chronic | ICD-10-CM

## 2024-12-09 DIAGNOSIS — Z72.0 TOBACCO USE: ICD-10-CM

## 2024-12-09 DIAGNOSIS — F41.1 GAD (GENERALIZED ANXIETY DISORDER): Chronic | ICD-10-CM

## 2024-12-09 DIAGNOSIS — Z87.820 HISTORY OF CONCUSSION: ICD-10-CM

## 2024-12-09 DIAGNOSIS — G47.00 INSOMNIA: ICD-10-CM

## 2024-12-09 PROCEDURE — 99204 OFFICE O/P NEW MOD 45 MIN: CPT | Performed by: STUDENT IN AN ORGANIZED HEALTH CARE EDUCATION/TRAINING PROGRAM

## 2024-12-09 RX ORDER — RIMEGEPANT SULFATE 75 MG/75MG
TABLET, ORALLY DISINTEGRATING ORAL
Qty: 8 TABLET | Refills: 6 | Status: SHIPPED | OUTPATIENT
Start: 2024-12-09

## 2024-12-09 RX ORDER — PROPRANOLOL HYDROCHLORIDE 60 MG/1
60 CAPSULE, EXTENDED RELEASE ORAL
Qty: 30 CAPSULE | Refills: 6 | Status: SHIPPED | OUTPATIENT
Start: 2024-12-09

## 2024-12-09 NOTE — ASSESSMENT & PLAN NOTE
Ms. Willson is here today for evaluation of a longstanding history of migraines.  We discussed a variety of potential treatment options, but she was open to trying propranolol for prevention.  She will update me approximately 4 to 6 weeks after starting it.  From an abortive standpoint, we are a bit limited because she is on carbamazepine which enhances the metabolism of medications like Ubrelvy, Nurtec, and Zavzpret.  Nonetheless I will have her try Nurtec and see if that is more effective for her than Ubrelvy.  I also suspect that some of the efficacy could be dependent on her overall headache frequency.  It is possible that some of her headaches are due to ongoing issues with sleep apnea so I have recommended that we repeat a home sleep study for further evaluation of this.

## 2024-12-09 NOTE — PATIENT INSTRUCTIONS
Additional Testing:   Sleep study  Labs: B12, B1, Folate, TSH    Referrals  Neuropsychology  [facility]: [phone]  [facility]: [phone]  Dr. Roberto Ochoa: [phone]  [facility]: [phone]  [facility]: [phone]    Headache Calendar  Please maintain a headache calendar  Consider using phone applications such as Migraine Don or [facility] Migraine Tracker    Headache/migraine treatment:   Acute medications (for immediate treatment of a headache):   It is ok to take ibuprofen, acetaminophen or naproxen (Advil, Tylenol,  Aleve, Excedrin) if they help your headaches you should limit these to No more than 2-3 times a week to avoid medication overuse/rebound headaches.     For your more moderate to severe migraines take this medication early   Take one NURTEC 75 mg at onset under tongue. Limit 1 in 24 hours. 8 a month.   Go online to [email or web address].com and sign up for coupon card.     Prescription preventive medications for headaches/migraines   (to take every day to help prevent headaches - not to take at the time of headache):  Propranolol - 60mg capsule at bedtime    *Typically these types of medications take time until you see the benefit, although some may see improvement in days, often it may take weeks, especially if the medication is being titrated up to a beneficial level. Please contact us if there are any concerns or questions regarding the medication.     Lifestyle Recommendations:  [x] SLEEP - Maintain a regular sleep schedule: Adults need at least 7-8 hours of uninterrupted a night. Maintain good sleep hygiene:  Going to bed and waking up at consistent times, avoiding excessive daytime naps, avoiding caffeinated beverages in the evening, avoid excessive stimulation in the evening and generally using bed primarily for sleeping.  One hour before bedtime would recommend  turning lights down lower, decreasing your activity (may read quietly, listen to music at a low volume). When you get into bed, should eliminate all technology (no texting, emailing, playing with your phone, iPad or tablet in bed).  [x] HYDRATION - Maintain good hydration.  Drink  2L of fluid a day (4 typical small water bottles)  [x] DIET - Maintain good nutrition. In particular don't skip meals and try and eat healthy balanced meals regularly.  [x] TRIGGERS - Look for other triggers and avoid them: Limit caffeine to 1-2 cups a day or less. Avoid dietary triggers that you have noticed bring on your headaches (this could include aged cheese, peanuts, MSG, aspartame and nitrates).  [x] EXERCISE - physical exercise as we all know is good for you in many ways, and not only is good for your heart, but also is beneficial for your mental health, cognitive health and  chronic pain/headaches. I would encourage at the least 5 days of physical exercise weekly for at least 30 minutes.     Education and Follow-up  [x] Please call with any questions or concerns. Of course if any new concerning symptoms go to the emergency department.  [x] Follow up in 4 months
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
## 2024-12-09 NOTE — PROGRESS NOTES
Review of Systems   Constitutional:  Negative for appetite change, fatigue and fever.   HENT: Negative.  Negative for hearing loss, tinnitus, trouble swallowing and voice change.    Eyes: Negative.  Negative for photophobia, pain and visual disturbance.   Respiratory: Negative.  Negative for shortness of breath.    Cardiovascular: Negative.  Negative for palpitations.   Gastrointestinal: Negative.  Negative for nausea and vomiting.   Endocrine: Negative.  Negative for cold intolerance.   Genitourinary: Negative.  Negative for dysuria, frequency and urgency.   Musculoskeletal:  Negative for back pain, gait problem, myalgias, neck pain and neck stiffness.   Skin: Negative.  Negative for rash.   Allergic/Immunologic: Negative.    Neurological:  Positive for numbness (left side arm/ fingers w/ migraine) and headaches (12-15/30 HA - 8 are more severe - 2-3/7 weekly Excedrin). Negative for dizziness, tremors, seizures, syncope, facial asymmetry, speech difficulty, weakness and light-headedness.   Hematological: Negative.  Does not bruise/bleed easily.   Psychiatric/Behavioral: Negative.  Negative for confusion, hallucinations and sleep disturbance.    All other systems reviewed and are negative.
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
## 2024-12-10 ENCOUNTER — TELEPHONE (OUTPATIENT)
Age: 49
End: 2024-12-10

## 2024-12-10 NOTE — TELEPHONE ENCOUNTER
Cc'd chart request-[facility] req PA.     No pharmacy benefit ins on file    Called [facility] pharmacy, I was automatically transferred to their .     Called [facility] pharmacy again, spoke to Braydon Bahena [phone]  n [phone]  Group (no   Id [id number]  [phone] [place] [facility]    Nurte PA initiated on CMM. (Key: [id number])   Member Eligibility cannot be Determined     Called [employer], spoke to Tessie and states that pt does not have active coverage. Plan terminated on 12/1/24. If pt has question about their eligibility, she can call [phone].     Per CMM-PA must be submitted through Form  [place] Fee-For-Service Medicaid Migraine Acute Treatment Agents Prior Authorization Form    Nurtec initiated on CMM.  (Key: [id number])   Unable to attach office notes-file is too large  The file must have a size between 5 KB and 5 MB     Awaiting determination

## 2024-12-16 ENCOUNTER — TELEPHONE (OUTPATIENT)
Dept: PSYCHIATRY | Facility: CLINIC | Age: 49
End: 2024-12-16

## 2024-12-16 NOTE — LETTER
24     Margi Willson   : 1975   [address] [facility] [address]       It is the policy of [facility] to monitor and manage appointments that have been no-showed or cancelled with less than 48-hour notice. This is necessary to ensure that we are able to provide timely access for all patients to our providers. Undue numbers of unutilized appointments delays necessary medical care for all patients.      Dear Margi Willson :      We are sorry that you missed your appointment with Shyla Mcwilliams MD on 2024. It has been 2 months or more since your last appointment. Your health and follow-up care are important to us. We want to make you aware of your next appointment with Shyla Mcwilliams MD that is scheduled for Wednesday , 2025 at 2:30pm.    Please be aware that our office policy states that if you 'no show' two or more Medication Management  appointments without prior notice of cancellation within in a calendar year, you may be discharged from Medication Management  treatment.  We want to bring this to your attention now to prevent an interruption of your care.  If you have any questions about this policy, please call us at the number above.     If we do not hear from you within 10 business days to make a follow up appointment, we will assume you are no longer interested in care here.    Thank you in advance for your cooperation and assistance.       Sincerely,      [facility] Support Staff

## 2024-12-17 ENCOUNTER — TELEPHONE (OUTPATIENT)
Age: 49
End: 2024-12-17

## 2024-12-17 NOTE — TELEPHONE ENCOUNTER
Writer called and Left  for patient in regards to ROF. Writer will send outside resources when patient returns my call. Due to not being able to take the referral at this time. Because we no longer have a provider that can complete the service.  left call back #[phone].

## 2024-12-30 DIAGNOSIS — F31.73 BIPOLAR I DISORDER, MOST RECENT EPISODE MANIC, IN PARTIAL REMISSION (HCC): Chronic | ICD-10-CM

## 2024-12-31 RX ORDER — CARBAMAZEPINE 200 MG/1
TABLET ORAL
Qty: 180 TABLET | Refills: 0 | Status: SHIPPED | OUTPATIENT
Start: 2024-12-31

## 2024-12-31 NOTE — TELEPHONE ENCOUNTER
Nurse left a message for Margi Willson [phone] - re: refill sent and asking to have labs completed.

## 2025-01-01 DIAGNOSIS — G43.709 CHRONIC MIGRAINE WITHOUT AURA WITHOUT STATUS MIGRAINOSUS, NOT INTRACTABLE: ICD-10-CM

## 2025-01-02 RX ORDER — PROPRANOLOL HYDROCHLORIDE 60 MG/1
60 CAPSULE, EXTENDED RELEASE ORAL
Qty: 90 CAPSULE | Refills: 1 | Status: SHIPPED | OUTPATIENT
Start: 2025-01-02

## 2025-01-29 DIAGNOSIS — K21.9 GASTROESOPHAGEAL REFLUX DISEASE WITHOUT ESOPHAGITIS: ICD-10-CM

## 2025-01-29 DIAGNOSIS — K44.9 HIATAL HERNIA: ICD-10-CM

## 2025-01-29 RX ORDER — OMEPRAZOLE 40 MG/1
40 CAPSULE, DELAYED RELEASE ORAL DAILY
Qty: 90 CAPSULE | Refills: 1 | Status: SHIPPED | OUTPATIENT
Start: 2025-01-29

## 2025-02-11 ENCOUNTER — TELEPHONE (OUTPATIENT)
Dept: PSYCHIATRY | Facility: CLINIC | Age: 50
End: 2025-02-11

## 2025-02-11 NOTE — TELEPHONE ENCOUNTER
LVM notifying patient that provider will be out today and 2/11/25 appt will be cancelled.    Advised patient to call back if they wanted to r/s or cancel.

## 2025-02-22 ENCOUNTER — TELEPHONE (OUTPATIENT)
Dept: PSYCHIATRY | Facility: CLINIC | Age: 50
End: 2025-02-22

## 2025-02-22 NOTE — TELEPHONE ENCOUNTER
NO-SHOW LETTER MAILED TO Margi Willson.  ADDRESS: [address] [place] [id number]

## 2025-02-23 DIAGNOSIS — K21.9 GASTROESOPHAGEAL REFLUX DISEASE WITHOUT ESOPHAGITIS: ICD-10-CM

## 2025-02-23 DIAGNOSIS — K44.9 HIATAL HERNIA: ICD-10-CM

## 2025-02-24 RX ORDER — OMEPRAZOLE 40 MG/1
40 CAPSULE, DELAYED RELEASE ORAL DAILY
Qty: 90 CAPSULE | Refills: 0 | Status: SHIPPED | OUTPATIENT
Start: 2025-02-24

## 2025-02-27 DIAGNOSIS — F41.1 GAD (GENERALIZED ANXIETY DISORDER): Chronic | ICD-10-CM

## 2025-02-27 DIAGNOSIS — G47.09 OTHER INSOMNIA: Chronic | ICD-10-CM

## 2025-02-27 DIAGNOSIS — F31.73 BIPOLAR I DISORDER, MOST RECENT EPISODE MANIC, IN PARTIAL REMISSION (HCC): Chronic | ICD-10-CM

## 2025-02-27 RX ORDER — LAMOTRIGINE 200 MG/1
400 TABLET ORAL
Qty: 60 TABLET | Refills: 4 | OUTPATIENT
Start: 2025-02-27 | End: 2025-07-27

## 2025-02-27 RX ORDER — TRAZODONE HYDROCHLORIDE 50 MG/1
50-100 TABLET ORAL
Qty: 60 TABLET | Refills: 4 | OUTPATIENT
Start: 2025-02-27 | End: 2025-07-27

## 2025-02-27 RX ORDER — HYDROXYZINE HYDROCHLORIDE 50 MG/1
50 TABLET, FILM COATED ORAL 2 TIMES DAILY
Qty: 60 TABLET | Refills: 4 | OUTPATIENT
Start: 2025-02-27

## 2025-03-03 DIAGNOSIS — G47.09 OTHER INSOMNIA: Chronic | ICD-10-CM

## 2025-03-03 DIAGNOSIS — F41.1 GAD (GENERALIZED ANXIETY DISORDER): Chronic | ICD-10-CM

## 2025-03-03 DIAGNOSIS — F90.0 ADHD, PREDOMINANTLY INATTENTIVE TYPE: Chronic | ICD-10-CM

## 2025-03-04 RX ORDER — ATOMOXETINE 80 MG/1
80 CAPSULE ORAL DAILY
Qty: 90 CAPSULE | Refills: 1 | OUTPATIENT
Start: 2025-03-04

## 2025-03-04 RX ORDER — TRAZODONE HYDROCHLORIDE 50 MG/1
50-100 TABLET ORAL
Qty: 60 TABLET | Refills: 4 | OUTPATIENT
Start: 2025-03-04 | End: 2025-08-01

## 2025-03-04 RX ORDER — HYDROXYZINE HYDROCHLORIDE 50 MG/1
50 TABLET, FILM COATED ORAL 2 TIMES DAILY
Qty: 60 TABLET | Refills: 4 | OUTPATIENT
Start: 2025-03-04

## 2025-03-04 NOTE — TELEPHONE ENCOUNTER
No pending appointment and 2 No Show appointments. Patient needs to schedule another appointment and keep it in order to continue treatment. Medications will be renewed once she schedules the appointment, but only until that appointment.

## 2025-03-05 ENCOUNTER — TELEPHONE (OUTPATIENT)
Dept: PSYCHIATRY | Facility: CLINIC | Age: 50
End: 2025-03-05

## 2025-03-05 ENCOUNTER — TELEMEDICINE (OUTPATIENT)
Dept: PSYCHIATRY | Facility: CLINIC | Age: 50
End: 2025-03-05
Payer: COMMERCIAL

## 2025-03-05 VITALS — HEIGHT: 63 IN | BODY MASS INDEX: 35.44 KG/M2 | WEIGHT: 200 LBS

## 2025-03-05 DIAGNOSIS — F31.74 BIPOLAR I DISORDER, MOST RECENT EPISODE MANIC, IN FULL REMISSION (HCC): Primary | Chronic | ICD-10-CM

## 2025-03-05 DIAGNOSIS — G47.09 OTHER INSOMNIA: Chronic | ICD-10-CM

## 2025-03-05 DIAGNOSIS — F90.0 ADHD, PREDOMINANTLY INATTENTIVE TYPE: Chronic | ICD-10-CM

## 2025-03-05 DIAGNOSIS — F41.1 GAD (GENERALIZED ANXIETY DISORDER): Chronic | ICD-10-CM

## 2025-03-05 DIAGNOSIS — Z79.899 LONG-TERM USE OF HIGH-RISK MEDICATION: Chronic | ICD-10-CM

## 2025-03-05 PROCEDURE — 90833 PSYTX W PT W E/M 30 MIN: CPT | Performed by: PSYCHIATRY & NEUROLOGY

## 2025-03-05 PROCEDURE — 99214 OFFICE O/P EST MOD 30 MIN: CPT | Performed by: PSYCHIATRY & NEUROLOGY

## 2025-03-05 RX ORDER — GABAPENTIN 100 MG/1
100 CAPSULE ORAL 3 TIMES DAILY
COMMUNITY
Start: 2025-01-28

## 2025-03-05 RX ORDER — LAMOTRIGINE 200 MG/1
400 TABLET ORAL
Qty: 60 TABLET | Refills: 4 | Status: SHIPPED | OUTPATIENT
Start: 2025-03-05 | End: 2025-08-02

## 2025-03-05 RX ORDER — TRAZODONE HYDROCHLORIDE 50 MG/1
50-100 TABLET ORAL
Qty: 180 TABLET | Refills: 1 | Status: SHIPPED | OUTPATIENT
Start: 2025-03-05 | End: 2025-08-02

## 2025-03-05 RX ORDER — CARBAMAZEPINE 200 MG/1
200 TABLET ORAL 2 TIMES DAILY
Qty: 60 TABLET | Refills: 4 | Status: SHIPPED | OUTPATIENT
Start: 2025-03-05 | End: 2025-08-02

## 2025-03-05 RX ORDER — TRAZODONE HYDROCHLORIDE 50 MG/1
50-100 TABLET ORAL
Qty: 60 TABLET | Refills: 4 | Status: SHIPPED | OUTPATIENT
Start: 2025-03-05 | End: 2025-03-05

## 2025-03-05 RX ORDER — ATOMOXETINE 80 MG/1
80 CAPSULE ORAL DAILY
Qty: 30 CAPSULE | Refills: 4 | Status: SHIPPED | OUTPATIENT
Start: 2025-03-05 | End: 2025-08-02

## 2025-03-05 RX ORDER — HYDROXYZINE HYDROCHLORIDE 50 MG/1
50 TABLET, FILM COATED ORAL 2 TIMES DAILY
Qty: 60 TABLET | Refills: 4 | Status: SHIPPED | OUTPATIENT
Start: 2025-03-05 | End: 2025-08-02

## 2025-03-05 NOTE — TELEPHONE ENCOUNTER
Called and left message for patient to return a call to [phone] and schedule 4 month follow up with provider (Dr Mcwilliams). Please schedule upon return call. Thank you.

## 2025-03-05 NOTE — ASSESSMENT & PLAN NOTE
Anxiety symptoms are controlled    Continue Atarax 50 mg twice a day to control anxiety symptoms  On Neurontin 100 mg bid to help with neuropathy and anxiety - prescribed by orthopedist  Orders:    hydrOXYzine HCL (ATARAX) 50 mg tablet; Take 1 tablet (50 mg total) by mouth 2 (two) times a day
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
## 2025-03-05 NOTE — ASSESSMENT & PLAN NOTE
Mood has been stable    Continue Lamictal 400 mg at bedtime to help with mood stabilization  Continue Tegretol 200 mg twice a day also to help with mood stabilization  Off Cymbalta - taper completed  Orders:    Carbamazepine level, total; Future    Lipid panel; Future    carBAMazepine (TEGretol) 200 mg tablet; Take 1 tablet (200 mg total) by mouth 2 (two) times a day    lamoTRIgine (LaMICtal) 200 MG tablet; Take 2 tablets (400 mg total) by mouth daily at bedtime

## 2025-03-05 NOTE — BH TREATMENT PLAN
"TREATMENT PLAN (Medication Management Only)        [facility]    Name/Date of Birth/MRN:  Margi Willson 49 y.o. 1975 MRN: [id number]  Date of Treatment Plan: March 5, 2025  Diagnosis/Diagnoses:   1. Bipolar I disorder, most recent episode manic, in full remission (HCC)    2. SANA (generalized anxiety disorder)    3. ADHD, predominantly inattentive type    4. Long-term use of high-risk medication    5. Other insomnia      Strengths/Personal Resources for Self-Care: \"good communication skills, using my coping skills when I am anxious (deep breathing, counting backwards)\".  Area/Areas of need (in own words): \"reducing my impulsivity\".  1. Long Term Goal:   maintain control of anxiety, maintain control of depression, maintain mood stability  Target Date: 4 months - 7/5/2025  Person/Persons responsible for completion of goal: Margi  2.  Short Term Objective (s) - How will we reach this goal?:   A.  Provider new recommended medication/dosage changes and/or continue medication(s): continue current medications as prescribed (Trazodone, Tegretol, Lamictal, Atarax, and Strattera).  B.  N/A.  C.  N/A.  Target Date: 4 months - 7/5/2025  Person/Persons Responsible for Completion of Goal: Margi   Progress Towards Goals: stable  Treatment Modality: medication management every 4 months  Review due 180 days from date of this plan: 6 months - 9/5/2025  Expected length of service: maintenance unless revised  My Physician/PA/NP and I have developed this plan together and I agree to work on the goals and objectives. I understand the treatment goals that were developed for my treatment.  Electronic Signatures: on file (unless signed below)    Shyla Mcwilliams MD 03/05/25  "

## 2025-03-05 NOTE — ASSESSMENT & PLAN NOTE
Occasional difficulty sleeping due to recent ankle fracture    Continue Trazodone 50 mg to 100 mg at bedtime as needed to help with insomnia  Orders:    traZODone (DESYREL) 50 mg tablet; Take 1-2 tablets ( mg total) by mouth daily at bedtime as needed for sleep

## 2025-03-05 NOTE — ASSESSMENT & PLAN NOTE
Monitor carbamazepine level and lipid profile before next visit. She has not done Tegretol level yet - new lab slip issued today  Orders:    Carbamazepine level, total; Future    Lipid panel; Future

## 2025-03-10 ENCOUNTER — TELEPHONE (OUTPATIENT)
Dept: PSYCHIATRY | Facility: CLINIC | Age: 50
End: 2025-03-10

## 2025-03-10 NOTE — TELEPHONE ENCOUNTER
Left voicemail informing patient and/or parent/guardian of the Psych Encounter form needing to be signed as a requirement from the insurance company for billing purposes. Patient can access form via Tappr and sign electronically.     Please make patient aware this form must be signed for each visit as a requirement to continue future visits with provider.    Virtual Encounter from 3/5/25 call #[phone]

## 2025-03-29 DIAGNOSIS — F41.1 GAD (GENERALIZED ANXIETY DISORDER): Chronic | ICD-10-CM

## 2025-03-31 RX ORDER — HYDROXYZINE HYDROCHLORIDE 50 MG/1
50 TABLET, FILM COATED ORAL 2 TIMES DAILY
Qty: 180 TABLET | Refills: 2 | OUTPATIENT
Start: 2025-03-31

## 2025-04-16 ENCOUNTER — TELEPHONE (OUTPATIENT)
Dept: NEUROLOGY | Facility: CLINIC | Age: 50
End: 2025-04-16

## 2025-04-16 NOTE — TELEPHONE ENCOUNTER
LMOM for pt to confirm their  8a   appt on 4/24    w/ [facility] . Reminded pt to arrive 15 mins prior to appt to check in with . Gave call back number [phone] to cancel/reschedule.

## 2025-04-21 DIAGNOSIS — G43.709 CHRONIC MIGRAINE WITHOUT AURA WITHOUT STATUS MIGRAINOSUS, NOT INTRACTABLE: ICD-10-CM

## 2025-04-21 DIAGNOSIS — F90.0 ADHD, PREDOMINANTLY INATTENTIVE TYPE: Chronic | ICD-10-CM

## 2025-04-21 RX ORDER — ATOMOXETINE 80 MG/1
80 CAPSULE ORAL DAILY
Qty: 90 CAPSULE | Refills: 1 | OUTPATIENT
Start: 2025-04-21

## 2025-04-21 RX ORDER — PROPRANOLOL HYDROCHLORIDE 60 MG/1
60 CAPSULE, EXTENDED RELEASE ORAL
Qty: 30 CAPSULE | Refills: 1 | Status: SHIPPED | OUTPATIENT
Start: 2025-04-21

## 2025-06-10 DIAGNOSIS — K44.9 HIATAL HERNIA: ICD-10-CM

## 2025-06-10 DIAGNOSIS — K21.9 GASTROESOPHAGEAL REFLUX DISEASE WITHOUT ESOPHAGITIS: ICD-10-CM

## 2025-06-10 RX ORDER — OMEPRAZOLE 40 MG/1
40 CAPSULE, DELAYED RELEASE ORAL DAILY
Qty: 90 CAPSULE | Refills: 1 | Status: SHIPPED | OUTPATIENT
Start: 2025-06-10

## 2025-07-08 ENCOUNTER — OFFICE VISIT (OUTPATIENT)
Dept: OBGYN CLINIC | Facility: CLINIC | Age: 50
End: 2025-07-08
Payer: COMMERCIAL

## 2025-07-08 VITALS — BODY MASS INDEX: 35.43 KG/M2 | HEIGHT: 63 IN

## 2025-07-08 DIAGNOSIS — Z87.81 STATUS POST ORIF OF FRACTURE OF ANKLE: Primary | ICD-10-CM

## 2025-07-08 DIAGNOSIS — Z98.890 STATUS POST ORIF OF FRACTURE OF ANKLE: Primary | ICD-10-CM

## 2025-07-08 PROCEDURE — 99214 OFFICE O/P EST MOD 30 MIN: CPT | Performed by: ORTHOPAEDIC SURGERY

## 2025-07-08 NOTE — PROGRESS NOTES
Sports Medicine and Shoulder Surgery    Margi Willson, 49 y.o. female   MRN# [id number]   : 1975        Assessment & Plan  Status post ORIF of fracture of ankle    Orders:    Diclofenac Sodium (VOLTAREN) 1 %; Apply 2 g topically 4 (four) times a day         We discussed the normal postoperative period after ankle ORIF  We did discuss that some of the pain she is experiencing likely is related to tendinitis of the peroneal tendons  We will send a prescription of Voltaren gel to see if this can help with some of her pain since she is unable to take oral anti-inflammatories due to her history of bariatric surgery  Recommended daily vitamin C  Recommended RICE  Recommended continuing to follow her surgeons protocol with physical therapy  Follow up: as needed  If any issues, questions, or concerns arise between now and the next appointment, we have encouraged the patient contact our team.        Chief Complaint:    Left Ankle Pain and Dysfunction    Subjective:   Patient comes in for evaluation of the ankle.  Patient said in 2025 she broke her ankle.  She had surgery with Dr. Catherine at [facility] on 2025.  She said after the procedure she started experiencing pain as well as numbness when she started placing weight.  She describes her pain primarily on the lateral shin/ankle.  She describes the numbness diffusely along the medial and lateral aspects of the shin as well as her 2nd, 3rd, and 4th toes.  She said she did try gabapentin which helped slightly.  She said that while she feels her symptoms are slightly improving they are still frustrating.  She is here as a second opinion.    Physical Examination:    Musculoskeletal: Left Ankle   Skin Intact . Well healed surgical incisions              Swelling mildly over lateral ankle              TTP: lateral ankle/lateral posterior shin   Range of motion and intact   Sensation intact throughout Superficial peroneal, Deep peroneal, Tibial, Sural,  Saphenous distributions              EHL/TA/PF motor function intact to testing.               BCR              Gait: Antalgic        Imaging Studies:  Independent interpretation of the ankle x-rays demonstrate evidence of previous ankle ORIF with intact hardware, healed posterior malleolus fracture, intact ankle mortise       Allergies:  Allergies   Allergen Reactions    Geodon [Ziprasidone]      Nystagmus    Quetiapine      Other reaction(s): Other (See Comments)  Per pt low blood pressure and fainting    Iron Other (See Comments)     Sensitivity to the iron they gave her at her infusion. Itchy scratchy throat and warm    Other      Seasonal allergies       Medications:  Current Medications[1]    Past Medical History:  Past Medical History[2]     Past Surgical History:  Past Surgical History[3]     Family History:  Family History[4]     Social History:  Social History     Socioeconomic History    Marital status: /Civil Union     Spouse name: Not on file    Number of children: 2    Years of education: bachelor's degree    Highest education level: Bachelor's degree (e.g., BA, AB, BS)   Occupational History    Occupation: works as a drug and alcohol funding  at [employer]   Tobacco Use    Smoking status: Some Days     Current packs/day: 0.00     Average packs/day: 0.3 packs/day for 3.2 years (0.8 ttl pk-yrs)     Types: Cigarettes     Start date:      Last attempt to quit: 2020     Years since quittin.4    Smokeless tobacco: Never    Tobacco comments:     About 3 cigs a day   Vaping Use    Vaping status: Never Used   Substance and Sexual Activity    Alcohol use: Yes     Comment: social as og  when asked    Drug use: No     Comment: Past cocaine and marijuana use for 2 years until . Also tried LSD years ago. 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[employer], 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, 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    Functioning Relationships: good support system,  is supportive    Legal History: none     History: None     Social Drivers of Health     Financial Resource Strain: Low Risk  (3/5/2025)    Overall Financial Resource Strain (CARDIA)     Difficulty of Paying Living Expenses: Not hard at all   Food Insecurity: No Food Insecurity (3/5/2025)    Nursing - Inadequate Food Risk Classification     Worried About Running Out of Food in the Last Year: Never true     Ran Out of Food in the Last Year: Never true     Ran Out of Food in the Last Year: Not on file   Transportation Needs: No Transportation Needs (3/5/2025)    PRAPARE - Transportation     Lack of Transportation (Medical): No     Lack of Transportation (Non-Medical): No   Physical Activity: Insufficiently Active (3/5/2025)    Exercise Vital Sign     Days of Exercise per Week: 2 days     Minutes of Exercise per Session: 60 min   Stress: Stress Concern Present (3/5/2025)    [employer] [place] [employer] [place] [employer] - Occupational Stress Questionnaire     Feeling of Stress : To some extent   Social Connections: Moderately Isolated (3/5/2025)    Social Connection and Isolation Panel     Frequency of Communication with Friends and Family: More than three times a week     Frequency of Social Gatherings with Friends and Family: More than three  times a week     Attends Druze Services: Never     Active Member of Clubs or Organizations: No     Attends Club or Organization Meetings: Never     Marital Status:    Intimate Partner Violence: Not At Risk (3/5/2025)    Humiliation, Afraid, Rape, and Kick questionnaire     Fear of Current or Ex-Partner: No     Emotionally Abused: No     Physically Abused: No     Sexually Abused: No   Housing Stability: Low Risk  (3/5/2025)    Housing Stability Vital Sign     Unable to Pay for Housing in the Last Year: No     Number of Times Moved in the Last Year: 0     Homeless in the Last Year: No        Review of Systems:  General- denies fever/chills  HEENT- denies hearing loss or sore throat  Eyes- denies eye pain or visual disturbances, denies red eyes  Respiratory- denies cough or SOB  Cardio- denies chest pain or palpitations  GI- denies abdominal pain  Endocrine- denies urinary frequency  Urinary- denies pain with urination  Musculoskeletal- Negative except noted above  Skin- denies rashes or wounds  Neurological- denies dizziness or headache  Psychiatric- denies anxiety or difficulty concentrating     Objective:   Body mass index is 35.43 kg/m².      ---------------------------------------------------------------------  Jaime Summers MD, PhD   Orthopedic Surgery, [facility]   Sports Medicine and Shoulder Surgery    The complexity of the medical decision making, including the comprehensive history, detailed examination and moderate risk assessment, supports coding at a Level 4 for this encounter     Scribe Attestation      I,:  Gianni Jewell PA-C am acting as a scribe while in the presence of the attending physician.:       I,:  Jaime Summers MD personally performed the services described in this documentation    as scribed in my presence.:                          [1]   Current Outpatient Medications:     atomoxetine (STRATTERA) 80 MG capsule, Take 1 capsule (80 mg total) by  mouth daily, Disp: 30 capsule, Rfl: 4    carBAMazepine (TEGretol) 200 mg tablet, Take 1 tablet (200 mg total) by mouth 2 (two) times a day, Disp: 60 tablet, Rfl: 4    Diclofenac Sodium (VOLTAREN) 1 %, Apply 2 g topically 4 (four) times a day, Disp: 20 g, Rfl: 1    gabapentin (NEURONTIN) 100 mg capsule, Take 100 mg by mouth 3 (three) times a day, Disp: , Rfl:     hydrOXYzine HCL (ATARAX) 50 mg tablet, Take 1 tablet (50 mg total) by mouth 2 (two) times a day, Disp: 60 tablet, Rfl: 4    lamoTRIgine (LaMICtal) 200 MG tablet, Take 2 tablets (400 mg total) by mouth daily at bedtime, Disp: 60 tablet, Rfl: 4    omeprazole (PriLOSEC) 40 MG capsule, Take 1 capsule (40 mg total) by mouth daily, Disp: 90 capsule, Rfl: 1    traZODone (DESYREL) 50 mg tablet, TAKE 1-2 TABLETS ( MG TOTAL) BY MOUTH DAILY AT BEDTIME AS NEEDED FOR SLEEP, Disp: 180 tablet, Rfl: 1    propranolol (INDERAL LA) 60 mg 24 hr capsule, TAKE 1 CAPSULE BY MOUTH AT BEDTIME, Disp: 30 capsule, Rfl: 1    rimegepant sulfate (Nurtec) 75 mg TBDP, Take one NURTEC 75 mg at onset under tongue. Limit 1 in 24 hours. 8 a month. (Patient not taking: Reported on 7/8/2025), Disp: 8 tablet, Rfl: 6  [2]   Past Medical History:  Diagnosis Date    Abnormal uterine bleeding (AUB) 02/10/2023    Formatting of this note might be different from the original.  Prolonged h/o heavy painful menses. Tried Mirena IUD but with minimal improvement.  Pelvic US w/ suspected adenomyosis.    Acute pancreatitis     Last Assessed: 12Apr2016; due to gallstones (removed)    Anemia     Anxiety     Arthralgia     Concussion     Last Assessed: 11Oct2012    Endometriosis     Esophageal reflux     Familial combined hyperlipidemia     Last Assessed: [id number]    Gastropathy     Head injury     History of sinusitis     Irregular heart beat     Migraine     Last Assessed: 11Oct2012    Neuropathy     Obstructive sleep apnea treated with continuous positive airway pressure (CPAP) 10/25/2021    Paroxysmal  supraventricular tachycardia (HCC)     Peptic ulcer disease     Post concussion syndrome 10/26/2021    Spontaneous      Substance abuse (HCC)     Suicide attempt (HCC)     Urinary tract infection     UTI (urinary tract infection)     Vitamin D deficiency     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 psychoactive substance (HCC) 2024   [3]   Past Surgical History:  Procedure Laterality Date    ANKLE FRACTURE SURGERY      BARIATRIC SURGERY  2020    sleeve     SECTION      FOREARM SURGERY Left     HYSTERECTOMY  2023    INDUCED       x3    LAPAROSCOPIC CHOLECYSTECTOMY      Last Assessed: 2016    LAPAROSCOPIC TOTAL HYSTERECTOMY      LAPAROSCOPY      Exploratory    SLEEVE GASTROPLASTY      TONSILLECTOMY      Onset: [id number]   [4]   Family History  Problem Relation Name Age of Onset    Hyperthyroidism Mother Annie     Thyroid disease Mother Annie     Depression Father susi     Hypertension Father susi     Obesity Father susi     Other Paternal Grandmother Saeid         Cardiac Disorder    Dementia Paternal Grandmother Saeid     Hypertension Paternal Grandmother Saeid     Diabetes Paternal Grandmother Saeid     Hypertension Paternal Grandfather Micheal     Other Paternal Grandfather Micheal         Cardiac Disorder    Diabetes Paternal Grandfather Micheal     Schizophrenia Other MGGM     Schizophrenia Other Uncle     Suicidality Other Uncle     Completed Suicide  Other Uncle     Breast cancer Other Mat 2nd cousin     Hypertension Paternal Aunt      No Known Problems Maternal Grandmother      No Known Problems Maternal Grandfather      No Known Problems Son      No Known Problems Son      No Known Problems Brother      No Known Problems Maternal Aunt      Stroke Neg Hx      Drug abuse Neg Hx

## 2025-07-23 DIAGNOSIS — G43.709 CHRONIC MIGRAINE WITHOUT AURA WITHOUT STATUS MIGRAINOSUS, NOT INTRACTABLE: ICD-10-CM

## 2025-07-27 DIAGNOSIS — F41.1 GAD (GENERALIZED ANXIETY DISORDER): Chronic | ICD-10-CM

## 2025-07-29 RX ORDER — PROPRANOLOL HYDROCHLORIDE 60 MG/1
60 CAPSULE, EXTENDED RELEASE ORAL
Qty: 90 CAPSULE | Refills: 1 | Status: SHIPPED | OUTPATIENT
Start: 2025-07-29

## 2025-07-29 RX ORDER — HYDROXYZINE HYDROCHLORIDE 50 MG/1
50 TABLET, FILM COATED ORAL 2 TIMES DAILY
Qty: 60 TABLET | Refills: 2 | Status: SHIPPED | OUTPATIENT
Start: 2025-07-29 | End: 2025-10-27

## 2025-08-02 DIAGNOSIS — F31.74 BIPOLAR I DISORDER, MOST RECENT EPISODE MANIC, IN FULL REMISSION (HCC): Chronic | ICD-10-CM

## 2025-08-04 RX ORDER — LAMOTRIGINE 200 MG/1
400 TABLET ORAL
Qty: 60 TABLET | Refills: 2 | Status: SHIPPED | OUTPATIENT
Start: 2025-08-04 | End: 2026-01-01